# Patient Record
Sex: MALE | Race: WHITE | NOT HISPANIC OR LATINO | Employment: FULL TIME | URBAN - METROPOLITAN AREA
[De-identification: names, ages, dates, MRNs, and addresses within clinical notes are randomized per-mention and may not be internally consistent; named-entity substitution may affect disease eponyms.]

---

## 2018-01-30 ENCOUNTER — OFFICE VISIT (OUTPATIENT)
Dept: MEDICAL GROUP | Facility: MEDICAL CENTER | Age: 43
End: 2018-01-30
Payer: COMMERCIAL

## 2018-01-30 VITALS
TEMPERATURE: 98.2 F | WEIGHT: 212 LBS | HEIGHT: 67 IN | RESPIRATION RATE: 16 BRPM | HEART RATE: 99 BPM | BODY MASS INDEX: 33.27 KG/M2 | SYSTOLIC BLOOD PRESSURE: 130 MMHG | DIASTOLIC BLOOD PRESSURE: 80 MMHG | OXYGEN SATURATION: 96 %

## 2018-01-30 DIAGNOSIS — Z76.89 ENCOUNTER TO ESTABLISH CARE: ICD-10-CM

## 2018-01-30 DIAGNOSIS — I27.82 OTHER CHRONIC PULMONARY EMBOLISM WITHOUT ACUTE COR PULMONALE (HCC): ICD-10-CM

## 2018-01-30 DIAGNOSIS — Z82.49 FAMILY HISTORY OF MI (MYOCARDIAL INFARCTION): ICD-10-CM

## 2018-01-30 DIAGNOSIS — G47.33 OBSTRUCTIVE SLEEP APNEA SYNDROME: ICD-10-CM

## 2018-01-30 DIAGNOSIS — E78.5 HYPERLIPIDEMIA, UNSPECIFIED HYPERLIPIDEMIA TYPE: ICD-10-CM

## 2018-01-30 DIAGNOSIS — E66.9 OBESITY (BMI 30-39.9): ICD-10-CM

## 2018-01-30 DIAGNOSIS — K21.9 GASTROESOPHAGEAL REFLUX DISEASE, ESOPHAGITIS PRESENCE NOT SPECIFIED: ICD-10-CM

## 2018-01-30 PROBLEM — I26.99 PULMONARY EMBOLISM WITHOUT ACUTE COR PULMONALE (HCC): Status: ACTIVE | Noted: 2018-01-30

## 2018-01-30 PROBLEM — K22.70 BARRETT ESOPHAGUS: Status: ACTIVE | Noted: 2018-01-30

## 2018-01-30 PROCEDURE — 99204 OFFICE O/P NEW MOD 45 MIN: CPT | Performed by: PHYSICIAN ASSISTANT

## 2018-01-30 RX ORDER — RANITIDINE 150 MG/1
150 TABLET ORAL 2 TIMES DAILY
Qty: 60 TAB | Refills: 3 | Status: SHIPPED | OUTPATIENT
Start: 2018-01-30 | End: 2018-07-11 | Stop reason: SDUPTHER

## 2018-01-30 RX ORDER — SIMVASTATIN 10 MG
10 TABLET ORAL NIGHTLY
COMMUNITY
End: 2018-12-03 | Stop reason: SDUPTHER

## 2018-01-30 RX ORDER — AMITRIPTYLINE HYDROCHLORIDE 100 MG/1
100 TABLET ORAL NIGHTLY
Status: ON HOLD | COMMUNITY
End: 2019-03-21

## 2018-01-30 RX ORDER — PANTOPRAZOLE SODIUM 40 MG/1
40 TABLET, DELAYED RELEASE ORAL 2 TIMES DAILY
COMMUNITY
End: 2020-01-07

## 2018-01-30 ASSESSMENT — PATIENT HEALTH QUESTIONNAIRE - PHQ9: CLINICAL INTERPRETATION OF PHQ2 SCORE: 0

## 2018-01-30 NOTE — ASSESSMENT & PLAN NOTE
Has a chronic history of reflux symptoms. Symptoms are usually worse in the early morning hours. He will take Protonix half hour prior to dinner. Still will develop symptoms around 3 AM. Has been evaluated with multiple upper and lower endoscopies. Has Randle's esophagus. Also has a early family history of colon cancer. Has had colonoscopies as well.

## 2018-01-30 NOTE — PROGRESS NOTES
Subjective:   Jerome Cardoza is a 42 y.o. male here today for establishing care and for CPAP order.    Obstructive sleep apnea syndrome  This is a 42-year-old male who is here today to establish care. Insurance changed. He states he is requesting a CPAP machine. He is already been evaluated by pulmonology. The CPAP was going to be ordered but his insurance changed.    Pulmonary embolism without acute cor pulmonale (CMS-HCC)  In October after his total knee replacement on the left side he developed a PE of his left lower lung. He is currently asymptomatic on Eliquis. States that he will be on the medication until end of July.    GERD (gastroesophageal reflux disease)  Has a chronic history of reflux symptoms. Symptoms are usually worse in the early morning hours. He will take Protonix half hour prior to dinner. Still will develop symptoms around 3 AM. Has been evaluated with multiple upper and lower endoscopies. Has Randle's esophagus. Also has a early family history of colon cancer. Has had colonoscopies as well.    Hyperlipidemia  Father  at age 62 from a heart attack. He is currently on Zocor daily for elevated cholesterol.       Current medicines (including changes today)  Current Outpatient Prescriptions   Medication Sig Dispense Refill   • pantoprazole (PROTONIX) 40 MG Tablet Delayed Response Take 40 mg by mouth.     • amitriptyline (ELAVIL) 100 MG Tab Take 100 mg by mouth every evening.     • simvastatin (ZOCOR) 10 MG Tab Take 10 mg by mouth every evening.     • apixaban (ELIQUIS) 5mg Tab Take 5 mg by mouth 2 Times a Day.     • ranitidine (ZANTAC) 150 MG Tab Take 1 Tab by mouth 2 times a day. 60 Tab 3   • ondansetron (ZOFRAN ODT) 4 MG TBDP Take 1 Tab by mouth every 8 hours as needed for Nausea/Vomiting. 15 Tab 1     No current facility-administered medications for this visit.      He  has a past medical history of Bronchitis; GERD (gastroesophageal reflux disease); IBS (irritable bowel  "syndrome); Indigestion; Pneumonia; and Renal disorder.    ROS   No chest pain, no shortness of breath, no abdominal pain and all other systems were reviewed and are negative.    Past medical history, social history and family history were updated and reviewed.     Objective:     Blood pressure 130/80, pulse 99, temperature 36.8 °C (98.2 °F), resp. rate 16, height 1.702 m (5' 7\"), weight 96.2 kg (212 lb), SpO2 96 %. Body mass index is 33.2 kg/m².   Physical Exam:  Constitutional: Alert, no distress.  Skin: Warm, dry, good turgor, no rashes in visible areas.  Eye: Equal, round and reactive, conjunctiva clear, lids normal.  ENMT: Lips without lesions, good dentition, oropharynx clear.  Neck: Trachea midline, no masses.   Lymph: No cervical or supraclavicular lymphadenopathy  Respiratory: Unlabored respiratory effort, lungs clear to auscultation, no wheezes, no ronchi.  Cardiovascular: Normal S1, S2, no murmur, no edema.  Abdomen: Soft, non-tender, no masses.  Psych: Alert and oriented x3, normal affect and mood.        Assessment and Plan:   The following treatment plan was discussed    1. Obstructive sleep apnea syndrome  Chronic condition. We'll try to obtain previous medical records at [facility]. We'll try also to order a CPAP machine.  - DME CPAP    2. Other chronic pulmonary embolism without acute cor pulmonale (CMS-HCC)  Chronic condition. Continue Eliquis daily. Contact me if request refill.    3. Hyperlipidemia, unspecified hyperlipidemia type  Chronic condition. Will obtain previous medical records to check on recent labs. Continue Zocor daily.    4. Gastroesophageal reflux disease, esophagitis presence not specified  Chronic condition and uncontrolled. Continue Protonix half hour prior to dinner. We'll also try ranitidine 150 mg hour prior to bedtime. He take 2 tablets if one tablet is not effective. Follow-up with GI as needed.  - ranitidine (ZANTAC) 150 MG Tab; Take 1 Tab by mouth 2 times a day.  Dispense: " 60 Tab; Refill: 3    5. Obesity (BMI 30-39.9)  Exercise routinely eat healthier. We'll monitor.  - Patient identified as having weight management issue.  Appropriate orders and counseling given.    6. Encounter to establish care      Followup: Return if symptoms worsen or fail to improve.    Please note that this dictation was created using voice recognition software. I have made every reasonable attempt to correct obvious errors, but I expect that there are errors of grammar and possibly content that I did not discover before finalizing the note.

## 2018-01-30 NOTE — ASSESSMENT & PLAN NOTE
In October after his total knee replacement on the left side he developed a PE of his left lower lung. He is currently asymptomatic on Eliquis. States that he will be on the medication until end of July.

## 2018-01-30 NOTE — LETTER
[facility]  Andrea Machado P.A.-C.  [address]  [place] [address]  Fax: [phone]   Authorization for Release/Disclosure of   Protected Health Information   Name: JEROME SALAZAR : 1975 SSN: [id number]   Address: [address] [place] [address] [address]  [place] [address] Phone:    [phone] (home)    I authorize the entity listed below to release/disclose the PHI below to:   [facility]/Andrea Machado P.A.-C. and Andrea Machado P.A.-C.   Provider or Entity Name:     Address   City, State, Zip   Phone:      Fax:     Reason for request: continuity of care   Information to be released:    [  ] LAST COLONOSCOPY,  including any PATH REPORT and follow-up  [  ] LAST FIT/COLOGUARD RESULT [  ] LAST DEXA  [  ] LAST MAMMOGRAM  [  ] LAST PAP  [  ] LAST LABS [  ] RETINA EXAM REPORT  [  ] IMMUNIZATION RECORDS  [  ] Release all info      [  ] Check here and initial the line next to each item to release ALL health information INCLUDING  _____ Care and treatment for drug and / or alcohol abuse  _____ HIV testing, infection status, or AIDS  _____ Genetic Testing    DATES OF SERVICE OR TIME PERIOD TO BE DISCLOSED: _____________  I understand and acknowledge that:  * This Authorization may be revoked at any time by you in writing, except if your health information has already been used or disclosed.  * Your health information that will be used or disclosed as a result of you signing this authorization could be re-disclosed by the recipient. If this occurs, your re-disclosed health information may no longer be protected by State or Federal laws.  * You may refuse to sign this Authorization. Your refusal will not affect your ability to obtain treatment.  * This Authorization becomes effective upon signing and will  on (date) __________.      If no date is indicated, this Authorization will  one (1) year from the signature date.    Name: Jerome Salazar    Signature:   Date:          1/30/2018       PLEASE FAX REQUESTED RECORDS BACK TO: [phone]

## 2018-01-30 NOTE — ASSESSMENT & PLAN NOTE
This is a 42-year-old male who is here today to establish care. Insurance changed. He states he is requesting a CPAP machine. He is already been evaluated by pulmonology. The CPAP was going to be ordered but his insurance changed.

## 2018-02-22 ENCOUNTER — OFFICE VISIT (OUTPATIENT)
Dept: MEDICAL GROUP | Facility: MEDICAL CENTER | Age: 43
End: 2018-02-22
Payer: COMMERCIAL

## 2018-02-22 VITALS
HEIGHT: 67 IN | DIASTOLIC BLOOD PRESSURE: 84 MMHG | TEMPERATURE: 99.1 F | RESPIRATION RATE: 16 BRPM | HEART RATE: 108 BPM | OXYGEN SATURATION: 97 % | BODY MASS INDEX: 31.55 KG/M2 | WEIGHT: 201 LBS | SYSTOLIC BLOOD PRESSURE: 124 MMHG

## 2018-02-22 DIAGNOSIS — K21.9 GASTROESOPHAGEAL REFLUX DISEASE, ESOPHAGITIS PRESENCE NOT SPECIFIED: ICD-10-CM

## 2018-02-22 DIAGNOSIS — E78.5 HYPERLIPIDEMIA, UNSPECIFIED HYPERLIPIDEMIA TYPE: ICD-10-CM

## 2018-02-22 DIAGNOSIS — R11.2 NON-INTRACTABLE VOMITING WITH NAUSEA, UNSPECIFIED VOMITING TYPE: ICD-10-CM

## 2018-02-22 DIAGNOSIS — K22.70 BARRETT'S ESOPHAGUS WITHOUT DYSPLASIA: ICD-10-CM

## 2018-02-22 DIAGNOSIS — G47.33 OBSTRUCTIVE SLEEP APNEA SYNDROME: ICD-10-CM

## 2018-02-22 DIAGNOSIS — Z87.19 HISTORY OF ESOPHAGEAL STRICTURE: ICD-10-CM

## 2018-02-22 DIAGNOSIS — Z82.69 FAMILY HISTORY OF SYSTEMIC LUPUS ERYTHEMATOSUS (SLE) IN MOTHER: ICD-10-CM

## 2018-02-22 PROCEDURE — 99214 OFFICE O/P EST MOD 30 MIN: CPT | Performed by: PHYSICIAN ASSISTANT

## 2018-02-22 NOTE — LETTER
[facility]  Andrea Machado P.A.-C.  [address]  [place] [address]  Fax: [phone]   Authorization for Release/Disclosure of   Protected Health Information   Name: JEROME SALAZAR : 1975 SSN: [id number]   Address: [address] [place], [place] [address]  [place] [address] Phone:    [phone] (home)    I authorize the entity listed below to release/disclose the PHI below to:   [facility]/Andrea Machado P.A.-C. and Andrea Machado P.A.-C.   Provider or Entity Name:  [facility]   Address   City, State, Zip [place]   Phone:      Fax:     Reason for request: continuity of care   Information to be released:    [  ] LAST COLONOSCOPY,  including any PATH REPORT and follow-up  [  ] LAST FIT/COLOGUARD RESULT [  ] LAST DEXA  [  ] LAST MAMMOGRAM  [  ] LAST PAP  [  ] LAST LABS [  ] RETINA EXAM REPORT  [  ] IMMUNIZATION RECORDS  [ x ] Release all info      [  ] Check here and initial the line next to each item to release ALL health information INCLUDING  _____ Care and treatment for drug and / or alcohol abuse  _____ HIV testing, infection status, or AIDS  _____ Genetic Testing    DATES OF SERVICE OR TIME PERIOD TO BE DISCLOSED: _____________  I understand and acknowledge that:  * This Authorization may be revoked at any time by you in writing, except if your health information has already been used or disclosed.  * Your health information that will be used or disclosed as a result of you signing this authorization could be re-disclosed by the recipient. If this occurs, your re-disclosed health information may no longer be protected by State or Federal laws.  * You may refuse to sign this Authorization. Your refusal will not affect your ability to obtain treatment.  * This Authorization becomes effective upon signing and will  on (date) __________.      If no date is indicated, this Authorization will  one (1) year from the signature date.    Name: Jerome Becerra  [place]    Signature:   Date:     2/22/2018       PLEASE FAX REQUESTED RECORDS BACK TO: [phone]

## 2018-02-23 NOTE — ASSESSMENT & PLAN NOTE
This is a 42-year-old male who complains of a two-week history of nauseousness and vomiting. States that symptoms began when he could not swallow food. Food gets stuck down in his esophagus. He states that in the past he has had dilatation of a stricture. Reflux symptoms are controlled. He is taking Protonix and added Zantac which is helping anymore. Has an appointment to see his doctor at [facility] but appointment isn't until April. Symptoms occurred in [place] while he was visiting his sister and mother. One of his sister who is a twin has lymphoma. He was evaluated at Hospital. He states that labs were unremarkable except for a decreased GFR and slightly elevated liver enzymes. Abdominal CT scan was done which was negative. Last 3 days has gotten better. Did have one episode last night of nauseousness with vomiting. He is trying to chew up his food as well as drink more liquids and soft foods.

## 2018-02-23 NOTE — ASSESSMENT & PLAN NOTE
He has a mother and sister with lupus. Concerned about his symptoms being secondary to lupus. Denies any skin changes.

## 2018-02-23 NOTE — ASSESSMENT & PLAN NOTE
He states he still is concerned about sleep apnea. Last time we talked about it and he has been waiting to be contacted.

## 2018-03-15 ENCOUNTER — HOSPITAL ENCOUNTER (OUTPATIENT)
Dept: LAB | Facility: MEDICAL CENTER | Age: 43
End: 2018-03-15
Attending: PHYSICIAN ASSISTANT
Payer: COMMERCIAL

## 2018-03-15 DIAGNOSIS — E78.5 HYPERLIPIDEMIA, UNSPECIFIED HYPERLIPIDEMIA TYPE: ICD-10-CM

## 2018-03-15 DIAGNOSIS — Z82.69 FAMILY HISTORY OF SYSTEMIC LUPUS ERYTHEMATOSUS (SLE) IN MOTHER: ICD-10-CM

## 2018-03-15 DIAGNOSIS — R11.2 NON-INTRACTABLE VOMITING WITH NAUSEA, UNSPECIFIED VOMITING TYPE: ICD-10-CM

## 2018-03-15 LAB
CHOLEST SERPL-MCNC: 199 MG/DL (ref 100–199)
HDLC SERPL-MCNC: 41 MG/DL
LDLC SERPL CALC-MCNC: 120 MG/DL
TRIGL SERPL-MCNC: 189 MG/DL (ref 0–149)

## 2018-03-15 PROCEDURE — 80061 LIPID PANEL: CPT

## 2018-03-15 PROCEDURE — 36415 COLL VENOUS BLD VENIPUNCTURE: CPT

## 2018-03-15 PROCEDURE — 86038 ANTINUCLEAR ANTIBODIES: CPT

## 2018-03-17 LAB — NUCLEAR IGG SER QL IA: NORMAL

## 2018-03-23 ENCOUNTER — SLEEP CENTER VISIT (OUTPATIENT)
Dept: SLEEP MEDICINE | Facility: MEDICAL CENTER | Age: 43
End: 2018-03-23
Payer: COMMERCIAL

## 2018-03-23 VITALS
DIASTOLIC BLOOD PRESSURE: 90 MMHG | HEIGHT: 67 IN | WEIGHT: 203 LBS | OXYGEN SATURATION: 98 % | RESPIRATION RATE: 16 BRPM | HEART RATE: 101 BPM | BODY MASS INDEX: 31.86 KG/M2 | SYSTOLIC BLOOD PRESSURE: 144 MMHG

## 2018-03-23 DIAGNOSIS — E78.5 HYPERLIPIDEMIA, UNSPECIFIED HYPERLIPIDEMIA TYPE: ICD-10-CM

## 2018-03-23 DIAGNOSIS — K21.9 GASTROESOPHAGEAL REFLUX DISEASE, ESOPHAGITIS PRESENCE NOT SPECIFIED: ICD-10-CM

## 2018-03-23 DIAGNOSIS — I27.82 OTHER CHRONIC PULMONARY EMBOLISM WITHOUT ACUTE COR PULMONALE (HCC): ICD-10-CM

## 2018-03-23 DIAGNOSIS — E66.9 OBESITY (BMI 30-39.9): ICD-10-CM

## 2018-03-23 DIAGNOSIS — Z72.0 TOBACCO ABUSE: ICD-10-CM

## 2018-03-23 DIAGNOSIS — G47.33 OBSTRUCTIVE SLEEP APNEA SYNDROME: ICD-10-CM

## 2018-03-23 PROCEDURE — 99244 OFF/OP CNSLTJ NEW/EST MOD 40: CPT | Performed by: INTERNAL MEDICINE

## 2018-03-23 RX ORDER — TRAMADOL HYDROCHLORIDE 50 MG/1
50 TABLET ORAL
COMMUNITY
Start: 2018-03-12 | End: 2018-03-19

## 2018-03-23 NOTE — PROGRESS NOTES
"CC: Here to establish care for obstructive sleep apnea syndrome.    HPI:  Mr. Jerome Mcmillan is a 43-year-old man who is kindly referred by Andrea Machado PA-C for evaluation of obstructive sleep apnea syndrome. The patient tells me that he had a home sleep study performed by [facility] which demonstrated sleep apnea. Had had an abnormal cnox first.  However his insurance changed before the treatment was ordered. He is here today for further evaluation and management of his sleep apnea and possibly for DME CPAP. Hopefully, this study he had in November will still qualify him for a trial of auto titrating CPAP. If not, our options are to repeat a home sleep test versus performing and attended split night polysomnogram. 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    His symptoms include waking up out of breath, morning headaches, fatigue, 2-3 nocturnal awakenings, waking up too early in the morning and being unable to return to sleep, sleepiness during the day, too little sleep at night, tiredness during the day, witnessed apneas, inconsistent breathing, dreams of falling or drowning, loud and disturbing snoring, resuscitative snorts, disturbing  dreams, and nocturnal headaches. He may fall asleep watching TV, at a theater, riding in a bus or car.    His sleep diary reveals no for 7 days but he awakened with a headache 6 of 7 days. He generally goes to bed between 10 PM and midnight and gets up between 5 AM and 8 AM.    His partner describes loud snoring, twitching of legs or feet during sleep, pauses in breathing, sleep talking, witnessed apneas, resuscitative snorts, and tossing and turning throughout the night. These behaviors occur every night not been present for \"a couple of years\".    Dad used cpap, though he has now . Mom and sisters use cpap.      Is APNEALINK was performed on 2017. The device use was an APNEALINK air type 3 portable device. The recording duration was 9 " hours and 38 minutes. His respiratory event index was 11.7. He had 222 oxygen desaturations and his MARGY was 23.6. He spent 4 hours and 58 minutes with saturations less than or equal to 88%.            Patient Active Problem List    Diagnosis Date Noted   • Tobacco abuse 03/23/2018   • History of esophageal stricture 02/22/2018   • Family history of systemic lupus erythematosus (SLE) in mother 02/22/2018   • Obstructive sleep apnea syndrome 01/30/2018   • Pulmonary embolism without acute cor pulmonale (CMS-HCC) 01/30/2018   • Hyperlipidemia 01/30/2018   • GERD (gastroesophageal reflux disease) 01/30/2018   • Obesity (BMI 30-39.9) 01/30/2018   • Randle esophagus 01/30/2018   • Family history of MI (myocardial infarction) 01/30/2018   • Non-intractable vomiting with nausea 11/28/2013       Past Medical History:   Diagnosis Date   • Bronchitis    • Chickenpox    • GERD (gastroesophageal reflux disease)    • IBS (irritable bowel syndrome)    • Indigestion    • Influenza    • Pneumonia    • Pulmonary embolism (CMS-[facility])    • Renal disorder     Acute Renal Failure from dehydration in the past   • Sleep apnea         Past Surgical History:   Procedure Laterality Date   • KNEE REPLACEMENT, TOTAL Left 10/2017   • LENORA BY LAPAROSCOPY      Cholecystectomy, Laparoscopic   • OTHER ORTHOPEDIC SURGERY     • TONSILLECTOMY     • TONSILLECTOMY         Family History   Problem Relation Age of Onset   • Other Mother      Lupus   • Sleep Apnea Mother    • Heart Disease Father    • Hypertension Father    • Sleep Apnea Father        Social History     Social History   • Marital status:      Spouse name: N/A   • Number of children: N/A   • Years of education: N/A     Occupational History   • Not on file.     Social History Main Topics   • Smoking status: Current Every Day Smoker     Packs/day: 0.50     Years: 22.00     Types: Cigarettes   • Smokeless tobacco: Never Used   • Alcohol use No   • Drug use: Yes     Types: Inhaled       "Comment: marijuana occ   • Sexual activity: Yes     Partners: Female     Other Topics Concern   • Not on file     Social History Narrative   • No narrative on file       Current Outpatient Prescriptions   Medication Sig Dispense Refill   • apixaban (ELIQUIS) 5mg Tab Take 1 Tab by mouth 2 Times a Day. 60 Tab 0   • pantoprazole (PROTONIX) 40 MG Tablet Delayed Response Take 40 mg by mouth.     • amitriptyline (ELAVIL) 100 MG Tab Take 100 mg by mouth every evening.     • simvastatin (ZOCOR) 10 MG Tab Take 10 mg by mouth every evening.     • ranitidine (ZANTAC) 150 MG Tab Take 1 Tab by mouth 2 times a day. 60 Tab 3   • ondansetron (ZOFRAN ODT) 4 MG TBDP Take 1 Tab by mouth every 8 hours as needed for Nausea/Vomiting. 15 Tab 1     No current facility-administered medications for this visit.     \"CURRENT RX\"    ALLERGIES: Fish    ROS  Constitutional: Denies fever, chills, sweats,  weight loss, fatigue.  Eyes: Denies vision loss, pain, drainage, double vision, glasses.  Ears/Nose/Mouth/Throat: Denies earache, rhinitis/nasal congestion, injury, recurrent sore throat, persistent hoarseness, decayed teeth/toothaches, ringing or buzzing in the ears. Difficulty hearing Cardiovascular: Denies chest pain, tightness, palpitations, swelling in legs/feet, fainting, difficulty breathing when lying down but gets better when sitting up.   Respiratory: Positive for shortness of breath, cough, painful breathing in the past but denies sputum and wheezing. He has not any symptoms regularly.   Sleep: per HPI  Gastrointestinal: Denies regular problems with difficulty swallowing, nausea, abdominal pain, diarrhea, constipation, heartburn. He had heartburn, difficulty swallowing, nausea, and abdominal pain in February. Genitourinary: Denies  blood in urine, discharge, frequent urination.   Musculoskeletal: Positive for painful joints and sore muscles but denies back pain. He has his shoes daily.  Integumentary: Denies rashes, lumps, color " "changes.   Neurological: Denies frequent headaches,weakness, dizziness.    PHYSICAL EXAM  MSEW    /90   Pulse (!) 101   Resp 16   Ht 1.702 m (5' 7\")   Wt 92.1 kg (203 lb)   SpO2 98%   BMI 31.79 kg/m²   Appearance: Well-nourished, well-developed, no acute distress. Has beard.  Eyes:  PERRLA, EOMI  Hearing:  Grossly intact  Nose:  Normal, no lesions or deformities, turbinates moist  Oropharynx:  Tongue normal, posterior pharynx without erythema or exudate  Mallampati classification:    Neck: Supple, trachea midline, no masses  Respiratory effort:  No intercostal retractions or use of accessory muscles  Lung auscultation:  No wheezes rhonchi rubs or rales  Cardiac auscultation:  No murmurs, rubs, or gallops, no regular rhythm, normal rate  Abdomen:  No tenderness, no organomegaly  Extremities:  No cyanosis, clubbing, edema  Gait and Station:  Normal  Digits and nails: No clubbing, cyanosis, petechiae, or nodes  Musculoskeletal:  Grossly normal  Skin:  No rashes  Orientation:  Oriented time, place, and person  Mood and affect:  No depression, anxiety, agitation  Judgment:  Intact    PROBLEMS:  1. Obstructive sleep apnea syndrome    2. Obesity (BMI 30-39.9)    - OBESITY COUNSELING (No Charge): Patient identified as having weight management issue.  Appropriate orders and counseling given.    3. Other chronic pulmonary embolism without acute cor pulmonale (CMS-HCC)    4. Gastroesophageal reflux disease, esophagitis presence not specified    5. Tobacco abuse    6. Hyperlipidemia, unspecified hyperlipidemia type              PLAN:   The risks of untreated sleep apnea were discussed with the patient at length. Patients with JEN are at increased risk of cardiovascular disease including coronary artery disease, systemic arterial hypertension, pulmonary arterial hypertension, cardiac arrythmias, and stroke. JEN patients have an increased risk of motor vehicle accidents, type 2 diabetes, chronic kidney disease, and " non-alcoholic liver disease. The patient was advised to avoid driving a motor vehicle when drowsy.    Positive airway pressure, such as CPAP, is considered first-line and preferred therapy for sleep apnea and may reverse both symptoms and risks.           Return in about 6 weeks (around 5/4/2018).

## 2018-04-09 ENCOUNTER — OFFICE VISIT (OUTPATIENT)
Dept: MEDICAL GROUP | Facility: MEDICAL CENTER | Age: 43
End: 2018-04-09
Payer: COMMERCIAL

## 2018-04-09 VITALS
SYSTOLIC BLOOD PRESSURE: 138 MMHG | BODY MASS INDEX: 32.05 KG/M2 | DIASTOLIC BLOOD PRESSURE: 80 MMHG | HEIGHT: 67 IN | OXYGEN SATURATION: 97 % | HEART RATE: 102 BPM | TEMPERATURE: 98.1 F | WEIGHT: 204.2 LBS

## 2018-04-09 DIAGNOSIS — I27.82 OTHER CHRONIC PULMONARY EMBOLISM WITHOUT ACUTE COR PULMONALE (HCC): ICD-10-CM

## 2018-04-09 DIAGNOSIS — G47.33 OBSTRUCTIVE SLEEP APNEA SYNDROME: ICD-10-CM

## 2018-04-09 DIAGNOSIS — R00.0 TACHYCARDIA: ICD-10-CM

## 2018-04-09 DIAGNOSIS — J20.9 ACUTE BRONCHITIS, UNSPECIFIED ORGANISM: ICD-10-CM

## 2018-04-09 DIAGNOSIS — K21.9 GASTROESOPHAGEAL REFLUX DISEASE, ESOPHAGITIS PRESENCE NOT SPECIFIED: ICD-10-CM

## 2018-04-09 PROCEDURE — 99214 OFFICE O/P EST MOD 30 MIN: CPT | Performed by: PHYSICIAN ASSISTANT

## 2018-04-09 RX ORDER — PREDNISONE 20 MG/1
TABLET ORAL
Qty: 10 TAB | Refills: 0 | Status: SHIPPED | OUTPATIENT
Start: 2018-04-09 | End: 2018-05-20

## 2018-04-09 NOTE — ASSESSMENT & PLAN NOTE
He did see sleep study and has a CPAP machine. States it helped with his headaches. Has follow-up with pulmonology soon.

## 2018-04-09 NOTE — ASSESSMENT & PLAN NOTE
Complains of a three-day history of a cough. No fevers. No shortness of breath or chest pain. Minimal wheezing noted.

## 2018-04-09 NOTE — PROGRESS NOTES
Subjective:   Jerome Cardoza Jr. is a 43 y.o. male here today for follow-up on tachycardia and a cough for 3 days.    Tachycardia  This is a 43-year-old male who is here today to discuss his heart rate being fast. Typically while resting pulse will jump in the 140s. Resting pulse is usually in the 60s. He noticed that this symptom began shortly before being diagnosed with a pulmonary embolism. Also concerned that Eliquis may be increasing in his pulse. Denies any chest pain no shortness of breath.    Pulmonary embolism without acute cor pulmonale (CMS-HCC)  Continues with Eliquis daily.    Obstructive sleep apnea syndrome  He did see sleep study and has a CPAP machine. States it helped with his headaches. Has follow-up with pulmonology soon.    Acute bronchitis  Complains of a three-day history of a cough. No fevers. No shortness of breath or chest pain. Minimal wheezing noted.    GERD (gastroesophageal reflux disease)  Has an appointment next month with GI. Symptoms aren't improved taking Protonix daily as well as Zantac prior to bedtime. No recent issues with vomiting in the morning.       Current medicines (including changes today)  Current Outpatient Prescriptions   Medication Sig Dispense Refill   • predniSONE (DELTASONE) 20 MG Tab Take two tablets daily x 5 days. 10 Tab 0   • apixaban (ELIQUIS) 5mg Tab Take 1 Tab by mouth 2 Times a Day. 60 Tab 0   • pantoprazole (PROTONIX) 40 MG Tablet Delayed Response Take 40 mg by mouth.     • amitriptyline (ELAVIL) 100 MG Tab Take 100 mg by mouth every evening.     • simvastatin (ZOCOR) 10 MG Tab Take 10 mg by mouth every evening.     • ranitidine (ZANTAC) 150 MG Tab Take 1 Tab by mouth 2 times a day. 60 Tab 3   • ondansetron (ZOFRAN ODT) 4 MG TBDP Take 1 Tab by mouth every 8 hours as needed for Nausea/Vomiting. 15 Tab 1     No current facility-administered medications for this visit.      He  has a past medical history of Bronchitis; Chickenpox; GERD  "(gastroesophageal reflux disease); IBS (irritable bowel syndrome); Indigestion; Influenza; Pneumonia; Pulmonary embolism (CMS-HCC); Renal disorder; and Sleep apnea.    Social History and Family History were reviewed and updated.    ROS   No chest pain, no shortness of breath, no abdominal pain and all other systems were reviewed and are negative.       Objective:     Blood pressure 138/80, pulse (!) 102, temperature 36.7 °C (98.1 °F), height 1.702 m (5' 7\"), weight 92.6 kg (204 lb 3.2 oz), SpO2 97 %. Body mass index is 31.98 kg/m².   Physical Exam:  Constitutional: Alert, no distress.  Skin: Warm, dry, good turgor, no rashes in visible areas.  Eye: Equal, round and reactive, conjunctiva clear, lids normal.  ENMT: Lips without lesions, good dentition, oropharynx clear.  Neck: Trachea midline, no masses.   Lymph: No cervical or supraclavicular lymphadenopathy  Respiratory: Unlabored respiratory effort, lungs with bilateral upper and lower base wheezing noted.  Cardiovascular: Normal S1, S2, no murmur, no edema.  Abdomen: Soft, non-tender, no masses.  Psych: Alert and oriented x3, normal affect and mood.        Assessment and Plan:   The following treatment plan was discussed    1. Tachycardia  Chronic condition. Advised to stop smoking. Exercise and eat healthier to lose weight. Referred to cardiology to establish care. I do not believe Eliquis is a reason for his tachycardia. Also does have hypertension.  - REFERRAL TO CARDIOLOGY    2. Acute bronchitis, unspecified organism  Acute, new onset condition. Advised to stop smoking. Provided prednisone 40 mg 5 days. No rescue inhaler provided. Contact me with any concerns. Follow as needed.  - predniSONE (DELTASONE) 20 MG Tab; Take two tablets daily x 5 days.  Dispense: 10 Tab; Refill: 0    3. Other chronic pulmonary embolism without acute cor pulmonale (CMS-HCC)  Chronic condition. Stable. Continue medication as directed. Continue to follow-up with pulmonology.    4. " Obstructive sleep apnea syndrome  Chronic condition. Stable. Continue CPAP use. Follow up with pulmonology as needed.    5. Gastroesophageal reflux disease, esophagitis presence not specified  Connick condition. Stable. Continue medications as directed. Follow-up with GI.      Followup: Return in about 4 weeks (around 5/7/2018), or if symptoms worsen or fail to improve.    Please note that this dictation was created using voice recognition software. I have made every reasonable attempt to correct obvious errors, but I expect that there are errors of grammar and possibly content that I did not discover before finalizing the note.

## 2018-04-09 NOTE — ASSESSMENT & PLAN NOTE
This is a 43-year-old male who is here today to discuss his heart rate being fast. Typically while resting pulse will jump in the 140s. Resting pulse is usually in the 60s. He noticed that this symptom began shortly before being diagnosed with a pulmonary embolism. Also concerned that Eliquis may be increasing in his pulse. Denies any chest pain no shortness of breath.

## 2018-04-09 NOTE — ASSESSMENT & PLAN NOTE
Has an appointment next month with GI. Symptoms aren't improved taking Protonix daily as well as Zantac prior to bedtime. No recent issues with vomiting in the morning.

## 2018-05-08 ENCOUNTER — TELEPHONE (OUTPATIENT)
Dept: CARDIOLOGY | Facility: MEDICAL CENTER | Age: 43
End: 2018-05-08

## 2018-05-16 ENCOUNTER — OFFICE VISIT (OUTPATIENT)
Dept: CARDIOLOGY | Facility: MEDICAL CENTER | Age: 43
End: 2018-05-16
Payer: COMMERCIAL

## 2018-05-16 VITALS
BODY MASS INDEX: 29.55 KG/M2 | HEART RATE: 104 BPM | HEIGHT: 68 IN | SYSTOLIC BLOOD PRESSURE: 124 MMHG | OXYGEN SATURATION: 97 % | DIASTOLIC BLOOD PRESSURE: 80 MMHG | WEIGHT: 195 LBS

## 2018-05-16 DIAGNOSIS — E78.5 DYSLIPIDEMIA: ICD-10-CM

## 2018-05-16 DIAGNOSIS — R00.0 HEART RATE FAST: ICD-10-CM

## 2018-05-16 DIAGNOSIS — Z82.49 FAMILY HISTORY OF MI (MYOCARDIAL INFARCTION): ICD-10-CM

## 2018-05-16 DIAGNOSIS — R00.0 TACHYCARDIA: ICD-10-CM

## 2018-05-16 DIAGNOSIS — I27.82 OTHER CHRONIC PULMONARY EMBOLISM WITHOUT ACUTE COR PULMONALE (HCC): ICD-10-CM

## 2018-05-16 PROCEDURE — 99244 OFF/OP CNSLTJ NEW/EST MOD 40: CPT | Mod: 25 | Performed by: INTERNAL MEDICINE

## 2018-05-16 PROCEDURE — 93000 ELECTROCARDIOGRAM COMPLETE: CPT | Performed by: INTERNAL MEDICINE

## 2018-05-16 ASSESSMENT — ENCOUNTER SYMPTOMS
PALPITATIONS: 0
HEADACHES: 1
PND: 0
COUGH: 0
DIZZINESS: 0
FOCAL WEAKNESS: 0
NAUSEA: 1
CLAUDICATION: 0
BRUISES/BLEEDS EASILY: 0
WEAKNESS: 0
SORE THROAT: 0
EYE REDNESS: 1
CHILLS: 0
HEARTBURN: 1
ABDOMINAL PAIN: 1
SHORTNESS OF BREATH: 1
BLURRED VISION: 0
FEVER: 0
FALLS: 0
VOMITING: 1

## 2018-05-16 NOTE — PROGRESS NOTES
Chief Complaint   Patient presents with   • Tachycardia       Subjective:   Jerome Cardoza Jr. is a 43 y.o. male who presents today in consultation from Mr. Machado for evaluation of tachycardia.  He has a history of obesity, sleep apnea on CPAP therapy, pulmonary embolism which seem to be provoked by left knee replacement in September 2017 on anticoagulation who has noted persistent tachycardia he has been checking his vitals at home and at rest he often has heart rates in the 110.  He still has some symptoms from his pulmonary embolism especially with activity he will notice some pleuritic chest pain on the left side but overall this has improved he follow closely with pulmonary afterwards and was recommended anticoagulation until the end of July for a full year of therapy.    Is also suffered from cyclic vomiting syndrome and follows closely with gastroenterology with a history of esophageal stricture and dilation he takes Elavil and closely monitors his diet    Past Medical History:   Diagnosis Date   • Bronchitis    • Chickenpox    • GERD (gastroesophageal reflux disease)    • IBS (irritable bowel syndrome)    • Indigestion    • Influenza    • Pneumonia    • Pulmonary embolism (HCC)    • Renal disorder     Acute Renal Failure from dehydration in the past   • Sleep apnea      Past Surgical History:   Procedure Laterality Date   • KNEE REPLACEMENT, TOTAL Left 10/2017   • LENORA BY LAPAROSCOPY      Cholecystectomy, Laparoscopic   • OTHER ORTHOPEDIC SURGERY     • TONSILLECTOMY     • TONSILLECTOMY       Family History   Problem Relation Age of Onset   • Other Mother      Lupus   • Sleep Apnea Mother    • Heart Disease Father    • Hypertension Father    • Sleep Apnea Father    • Heart Disease Paternal Uncle    • Heart Disease Paternal Grandfather      Social History     Social History   • Marital status:      Spouse name: N/A   • Number of children: N/A   • Years of education: N/A     Occupational  History   • Not on file.     Social History Main Topics   • Smoking status: Current Every Day Smoker     Packs/day: 0.50     Years: 22.00     Types: Cigarettes   • Smokeless tobacco: Never Used   • Alcohol use No   • Drug use: Yes     Types: Inhaled      Comment: marijuana occ   • Sexual activity: Yes     Partners: Female     Other Topics Concern   • Not on file     Social History Narrative   • No narrative on file     Allergies   Allergen Reactions   • Fish Anaphylaxis     Outpatient Encounter Prescriptions as of 5/16/2018   Medication Sig Dispense Refill   • apixaban (ELIQUIS) 5mg Tab Take 1 Tab by mouth 2 Times a Day. 60 Tab 0   • pantoprazole (PROTONIX) 40 MG Tablet Delayed Response Take 40 mg by mouth.     • amitriptyline (ELAVIL) 100 MG Tab Take 100 mg by mouth every evening.     • simvastatin (ZOCOR) 10 MG Tab Take 10 mg by mouth every evening.     • ranitidine (ZANTAC) 150 MG Tab Take 1 Tab by mouth 2 times a day. 60 Tab 3   • ondansetron (ZOFRAN ODT) 4 MG TBDP Take 1 Tab by mouth every 8 hours as needed for Nausea/Vomiting. 15 Tab 1   • predniSONE (DELTASONE) 20 MG Tab Take two tablets daily x 5 days. 10 Tab 0     No facility-administered encounter medications on file as of 5/16/2018.      Review of Systems   Constitutional: Positive for malaise/fatigue. Negative for chills and fever.   HENT: Positive for congestion and hearing loss. Negative for sore throat.    Eyes: Positive for redness. Negative for blurred vision.   Respiratory: Positive for shortness of breath. Negative for cough.    Cardiovascular: Negative for chest pain, palpitations, claudication, leg swelling and PND.   Gastrointestinal: Positive for abdominal pain, heartburn, nausea and vomiting.   Musculoskeletal: Positive for joint pain. Negative for falls.   Skin: Negative for rash.   Neurological: Positive for headaches. Negative for dizziness, focal weakness and weakness.   Endo/Heme/Allergies: Does not bruise/bleed easily.        Objective:  "  /80   Pulse (!) 104   Ht 1.727 m (5' 8\")   Wt 88.5 kg (195 lb)   SpO2 97%   BMI 29.65 kg/m²     Physical Exam   Constitutional: No distress.   HENT:   Mouth/Throat: Oropharynx is clear and moist. No oropharyngeal exudate.   Eyes: No scleral icterus.   Neck: No JVD present.   Cardiovascular: Normal rate and normal heart sounds.  Exam reveals no gallop and no friction rub.    No murmur heard.  Pulmonary/Chest: No respiratory distress. He has no wheezes. He has no rales.   Abdominal: Soft. Bowel sounds are normal.   Musculoskeletal: He exhibits no edema.   Neurological: He is alert.   Skin: No rash noted. He is not diaphoretic.   Psychiatric: He has a normal mood and affect.     EKG today shows sinus tachycardia    I do not have the formal report of his echocardiogram but his right heart size and estimate of RVSP were commented as normal in his pulmonary office follow-up visits    Labs reviewed he has mild dyslipidemia   Assessment:     1. Heart rate fast  EKG   2. Tachycardia     3. Family history of MI (myocardial infarction)     4. Dyslipidemia     5. Other chronic pulmonary embolism without acute cor pulmonale (HCC)         Medical Decision Making:  Today's Assessment / Status / Plan:     It was my pleasure to meet with Mr. Cardoza.    His sinus tachycardia is likely multifactorial could be due to of Elavil and his cyclic vomiting syndrome as a baseline and having had pulmonary embolism this year certainly can be associated with sinus tachycardia we discussed available options for workup and treatment I think overall he would prefer conservative therapy and continue to monitor certainly can try metoprolol if he finds that heart rate being elevated over time is worrisome.  We also discussed he could also do a 24-hour Holter monitor if necessary to have a better understanding of his average heart rate in response to activities as well as sleep    He will complete his course of therapy for his " unprovoked pulmonary embolism    Discussed is quite reasonable take low-dose statin therapy given his family history and dyslipidemia    Mr. Cardoza does not require regular cardiology follow up, I have advised him to call our office or e-mail using my MyChart if needed.    It is my pleasure to participate in the care of Mr. Cardoza.  Please do not hesitate to contact me with questions or concerns.    Ray Almanza MD PhD FACC  Cardiologist [facility]

## 2018-05-16 NOTE — LETTER
[facility]   [address]   [address]  [place] [address]  Phone: [phone]  Fax: [phone]              Jerome Cardoza Jr.  1975    Encounter Date: 5/16/2018    Ray Almanza M.D.          PROGRESS NOTE:  Chief Complaint   Patient presents with   • Tachycardia       Subjective:   Jerome Cardoza Jr. is a 43 y.o. male who presents today in consultation from Mr. Machado for evaluation of tachycardia.  He has a history of obesity, sleep apnea on CPAP therapy, pulmonary embolism which seem to be provoked by left knee replacement in September 2017 on anticoagulation who has noted persistent tachycardia he has been checking his vitals at home and at rest he often has heart rates in the 110.  He still has some symptoms from his pulmonary embolism especially with activity he will notice some pleuritic chest pain on the left side but overall this has improved he follow closely with pulmonary afterwards and was recommended anticoagulation until the end of July for a full year of therapy.    Is also suffered from cyclic vomiting syndrome and follows closely with gastroenterology with a history of esophageal stricture and dilation he takes Elavil and closely monitors his diet    Past Medical History:   Diagnosis Date   • Bronchitis    • Chickenpox    • GERD (gastroesophageal reflux disease)    • IBS (irritable bowel syndrome)    • Indigestion    • Influenza    • Pneumonia    • Pulmonary embolism (HCC)    • Renal disorder     Acute Renal Failure from dehydration in the past   • Sleep apnea      Past Surgical History:   Procedure Laterality Date   • KNEE REPLACEMENT, TOTAL Left 10/2017   • LENORA BY LAPAROSCOPY      Cholecystectomy, Laparoscopic   • OTHER ORTHOPEDIC SURGERY     • TONSILLECTOMY     • TONSILLECTOMY       Family History   Problem Relation Age of Onset   • Other Mother      Lupus   • Sleep Apnea Mother    •  Heart Disease Father    • Hypertension Father    • Sleep Apnea Father    • Heart Disease Paternal Uncle    • Heart Disease Paternal Grandfather      Social History     Social History   • Marital status:      Spouse name: N/A   • Number of children: N/A   • Years of education: N/A     Occupational History   • Not on file.     Social History Main Topics   • Smoking status: Current Every Day Smoker     Packs/day: 0.50     Years: 22.00     Types: Cigarettes   • Smokeless tobacco: Never Used   • Alcohol use No   • Drug use: Yes     Types: Inhaled      Comment: marijuana occ   • Sexual activity: Yes     Partners: Female     Other Topics Concern   • Not on file     Social History Narrative   • No narrative on file     Allergies   Allergen Reactions   • Fish Anaphylaxis     Outpatient Encounter Prescriptions as of 5/16/2018   Medication Sig Dispense Refill   • apixaban (ELIQUIS) 5mg Tab Take 1 Tab by mouth 2 Times a Day. 60 Tab 0   • pantoprazole (PROTONIX) 40 MG Tablet Delayed Response Take 40 mg by mouth.     • amitriptyline (ELAVIL) 100 MG Tab Take 100 mg by mouth every evening.     • simvastatin (ZOCOR) 10 MG Tab Take 10 mg by mouth every evening.     • ranitidine (ZANTAC) 150 MG Tab Take 1 Tab by mouth 2 times a day. 60 Tab 3   • ondansetron (ZOFRAN ODT) 4 MG TBDP Take 1 Tab by mouth every 8 hours as needed for Nausea/Vomiting. 15 Tab 1   • predniSONE (DELTASONE) 20 MG Tab Take two tablets daily x 5 days. 10 Tab 0     No facility-administered encounter medications on file as of 5/16/2018.      Review of Systems   Constitutional: Positive for malaise/fatigue. Negative for chills and fever.   HENT: Positive for congestion and hearing loss. Negative for sore throat.    Eyes: Positive for redness. Negative for blurred vision.   Respiratory: Positive for shortness of breath. Negative for cough.    Cardiovascular: Negative for chest pain, palpitations, claudication, leg swelling and PND.   Gastrointestinal: Positive  "for abdominal pain, heartburn, nausea and vomiting.   Musculoskeletal: Positive for joint pain. Negative for falls.   Skin: Negative for rash.   Neurological: Positive for headaches. Negative for dizziness, focal weakness and weakness.   Endo/Heme/Allergies: Does not bruise/bleed easily.        Objective:   /80   Pulse (!) 104   Ht 1.727 m (5' 8\")   Wt 88.5 kg (195 lb)   SpO2 97%   BMI 29.65 kg/m²      Physical Exam   Constitutional: No distress.   HENT:   Mouth/Throat: Oropharynx is clear and moist. No oropharyngeal exudate.   Eyes: No scleral icterus.   Neck: No JVD present.   Cardiovascular: Normal rate and normal heart sounds.  Exam reveals no gallop and no friction rub.    No murmur heard.  Pulmonary/Chest: No respiratory distress. He has no wheezes. He has no rales.   Abdominal: Soft. Bowel sounds are normal.   Musculoskeletal: He exhibits no edema.   Neurological: He is alert.   Skin: No rash noted. He is not diaphoretic.   Psychiatric: He has a normal mood and affect.     EKG today shows sinus tachycardia    I do not have the formal report of his echocardiogram but his right heart size and estimate of RVSP were commented as normal in his pulmonary office follow-up visits    Labs reviewed he has mild dyslipidemia   Assessment:     1. Heart rate fast  EKG   2. Tachycardia     3. Family history of MI (myocardial infarction)     4. Dyslipidemia     5. Other chronic pulmonary embolism without acute cor pulmonale (HCC)         Medical Decision Making:  Today's Assessment / Status / Plan:     It was my pleasure to meet with Mr. Cardoza.    His sinus tachycardia is likely multifactorial could be due to of Elavil and his cyclic vomiting syndrome as a baseline and having had pulmonary embolism this year certainly can be associated with sinus tachycardia we discussed available options for workup and treatment I think overall he would prefer conservative therapy and continue to monitor certainly can " try metoprolol if he finds that heart rate being elevated over time is worrisome.  We also discussed he could also do a 24-hour Holter monitor if necessary to have a better understanding of his average heart rate in response to activities as well as sleep    He will complete his course of therapy for his unprovoked pulmonary embolism    Discussed is quite reasonable take low-dose statin therapy given his family history and dyslipidemia    Mr. Cardoza does not require regular cardiology follow up, I have advised him to call our office or e-mail using my MyChart if needed.    It is my pleasure to participate in the care of Mr. Cardoza.  Please do not hesitate to contact me with questions or concerns.    Ray Almanza MD PhD [facility]  Cardiologist [facility]        Santana Perea M.D.  [address] [facility] [address]  VIA Facsimile: [phone]     Andrea Machado, P.A.-C.  [address]  [address]  [facility] [address]  VIA In Basket

## 2018-05-17 LAB — EKG IMPRESSION: NORMAL

## 2018-05-20 ENCOUNTER — APPOINTMENT (OUTPATIENT)
Dept: RADIOLOGY | Facility: MEDICAL CENTER | Age: 43
End: 2018-05-20
Attending: EMERGENCY MEDICINE
Payer: COMMERCIAL

## 2018-05-20 ENCOUNTER — HOSPITAL ENCOUNTER (OUTPATIENT)
Facility: MEDICAL CENTER | Age: 43
End: 2018-05-22
Attending: EMERGENCY MEDICINE | Admitting: INTERNAL MEDICINE
Payer: COMMERCIAL

## 2018-05-20 ENCOUNTER — APPOINTMENT (OUTPATIENT)
Dept: RADIOLOGY | Facility: MEDICAL CENTER | Age: 43
End: 2018-05-20
Payer: COMMERCIAL

## 2018-05-20 DIAGNOSIS — R11.15 INTRACTABLE CYCLICAL VOMITING WITH NAUSEA: ICD-10-CM

## 2018-05-20 DIAGNOSIS — R07.9 CHEST PAIN, UNSPECIFIED TYPE: ICD-10-CM

## 2018-05-20 PROBLEM — Z86.711 HISTORY OF PULMONARY EMBOLISM: Status: ACTIVE | Noted: 2018-01-30

## 2018-05-20 PROBLEM — R73.9 HYPERGLYCEMIA: Status: ACTIVE | Noted: 2018-05-20

## 2018-05-20 PROBLEM — D69.6 THROMBOCYTOPENIA (HCC): Status: ACTIVE | Noted: 2018-05-20

## 2018-05-20 PROBLEM — E66.9 OBESITY (BMI 30-39.9): Status: RESOLVED | Noted: 2018-01-30 | Resolved: 2018-05-20

## 2018-05-20 PROBLEM — R07.89 OTHER CHEST PAIN: Status: ACTIVE | Noted: 2018-05-20

## 2018-05-20 PROBLEM — K21.00 GASTROESOPHAGEAL REFLUX DISEASE WITH ESOPHAGITIS: Status: ACTIVE | Noted: 2018-01-30

## 2018-05-20 LAB
ALBUMIN SERPL BCP-MCNC: 5 G/DL (ref 3.2–4.9)
ALBUMIN/GLOB SERPL: 1.6 G/DL
ALP SERPL-CCNC: 98 U/L (ref 30–99)
ALT SERPL-CCNC: 30 U/L (ref 2–50)
ANION GAP SERPL CALC-SCNC: 13 MMOL/L (ref 0–11.9)
APTT PPP: 24.5 SEC (ref 24.7–36)
AST SERPL-CCNC: 31 U/L (ref 12–45)
BASOPHILS # BLD AUTO: 0.2 % (ref 0–1.8)
BASOPHILS # BLD: 0.03 K/UL (ref 0–0.12)
BILIRUB SERPL-MCNC: 0.6 MG/DL (ref 0.1–1.5)
BNP SERPL-MCNC: 42 PG/ML (ref 0–100)
BUN SERPL-MCNC: 16 MG/DL (ref 8–22)
CALCIUM SERPL-MCNC: 9.7 MG/DL (ref 8.4–10.2)
CHLORIDE SERPL-SCNC: 105 MMOL/L (ref 96–112)
CO2 SERPL-SCNC: 20 MMOL/L (ref 20–33)
CREAT SERPL-MCNC: 0.97 MG/DL (ref 0.5–1.4)
EKG IMPRESSION: NORMAL
EOSINOPHIL # BLD AUTO: 0 K/UL (ref 0–0.51)
EOSINOPHIL NFR BLD: 0 % (ref 0–6.9)
ERYTHROCYTE [DISTWIDTH] IN BLOOD BY AUTOMATED COUNT: 39.1 FL (ref 35.9–50)
EST. AVERAGE GLUCOSE BLD GHB EST-MCNC: 120 MG/DL
GLOBULIN SER CALC-MCNC: 3.1 G/DL (ref 1.9–3.5)
GLUCOSE SERPL-MCNC: 167 MG/DL (ref 65–99)
HBA1C MFR BLD: 5.8 % (ref 0–5.6)
HCT VFR BLD AUTO: 47.2 % (ref 42–52)
HGB BLD-MCNC: 16.4 G/DL (ref 14–18)
IMM GRANULOCYTES # BLD AUTO: 0.16 K/UL (ref 0–0.11)
IMM GRANULOCYTES NFR BLD AUTO: 0.9 % (ref 0–0.9)
INR PPP: 0.88 (ref 0.87–1.13)
LIPASE SERPL-CCNC: 19 U/L (ref 7–58)
LYMPHOCYTES # BLD AUTO: 1.4 K/UL (ref 1–4.8)
LYMPHOCYTES NFR BLD: 7.9 % (ref 22–41)
MAGNESIUM SERPL-MCNC: 1.9 MG/DL (ref 1.5–2.5)
MCH RBC QN AUTO: 29 PG (ref 27–33)
MCHC RBC AUTO-ENTMCNC: 34.7 G/DL (ref 33.7–35.3)
MCV RBC AUTO: 83.5 FL (ref 81.4–97.8)
MONOCYTES # BLD AUTO: 0.62 K/UL (ref 0–0.85)
MONOCYTES NFR BLD AUTO: 3.5 % (ref 0–13.4)
NEUTROPHILS # BLD AUTO: 15.62 K/UL (ref 1.82–7.42)
NEUTROPHILS NFR BLD: 87.5 % (ref 44–72)
NRBC # BLD AUTO: 0 K/UL
NRBC BLD-RTO: 0 /100 WBC
PHOSPHATE SERPL-MCNC: 1.9 MG/DL (ref 2.5–4.5)
PLATELET # BLD AUTO: 121 K/UL (ref 164–446)
PMV BLD AUTO: 9.9 FL (ref 9–12.9)
POTASSIUM SERPL-SCNC: 4 MMOL/L (ref 3.6–5.5)
PROT SERPL-MCNC: 8.1 G/DL (ref 6–8.2)
PROTHROMBIN TIME: 11.9 SEC (ref 12–14.6)
RBC # BLD AUTO: 5.65 M/UL (ref 4.7–6.1)
SODIUM SERPL-SCNC: 138 MMOL/L (ref 135–145)
TROPONIN I SERPL-MCNC: <0.02 NG/ML (ref 0–0.04)
TROPONIN I SERPL-MCNC: <0.02 NG/ML (ref 0–0.04)
TSH SERPL DL<=0.005 MIU/L-ACNC: 0.58 UIU/ML (ref 0.38–5.33)
WBC # BLD AUTO: 17.8 K/UL (ref 4.8–10.8)

## 2018-05-20 PROCEDURE — 83690 ASSAY OF LIPASE: CPT

## 2018-05-20 PROCEDURE — 700102 HCHG RX REV CODE 250 W/ 637 OVERRIDE(OP): Performed by: INTERNAL MEDICINE

## 2018-05-20 PROCEDURE — 700101 HCHG RX REV CODE 250: Performed by: INTERNAL MEDICINE

## 2018-05-20 PROCEDURE — 71045 X-RAY EXAM CHEST 1 VIEW: CPT

## 2018-05-20 PROCEDURE — 700102 HCHG RX REV CODE 250 W/ 637 OVERRIDE(OP)

## 2018-05-20 PROCEDURE — 83735 ASSAY OF MAGNESIUM: CPT

## 2018-05-20 PROCEDURE — 304561 HCHG STAT O2

## 2018-05-20 PROCEDURE — A9270 NON-COVERED ITEM OR SERVICE: HCPCS

## 2018-05-20 PROCEDURE — 83880 ASSAY OF NATRIURETIC PEPTIDE: CPT

## 2018-05-20 PROCEDURE — 700111 HCHG RX REV CODE 636 W/ 250 OVERRIDE (IP)

## 2018-05-20 PROCEDURE — 36415 COLL VENOUS BLD VENIPUNCTURE: CPT

## 2018-05-20 PROCEDURE — 96374 THER/PROPH/DIAG INJ IV PUSH: CPT

## 2018-05-20 PROCEDURE — 700117 HCHG RX CONTRAST REV CODE 255: Performed by: EMERGENCY MEDICINE

## 2018-05-20 PROCEDURE — 700105 HCHG RX REV CODE 258: Performed by: EMERGENCY MEDICINE

## 2018-05-20 PROCEDURE — 700105 HCHG RX REV CODE 258: Performed by: INTERNAL MEDICINE

## 2018-05-20 PROCEDURE — 93005 ELECTROCARDIOGRAM TRACING: CPT

## 2018-05-20 PROCEDURE — 85610 PROTHROMBIN TIME: CPT

## 2018-05-20 PROCEDURE — 80053 COMPREHEN METABOLIC PANEL: CPT

## 2018-05-20 PROCEDURE — 84443 ASSAY THYROID STIM HORMONE: CPT

## 2018-05-20 PROCEDURE — 74177 CT ABD & PELVIS W/CONTRAST: CPT

## 2018-05-20 PROCEDURE — 96375 TX/PRO/DX INJ NEW DRUG ADDON: CPT

## 2018-05-20 PROCEDURE — C9113 INJ PANTOPRAZOLE SODIUM, VIA: HCPCS

## 2018-05-20 PROCEDURE — 93005 ELECTROCARDIOGRAM TRACING: CPT | Performed by: EMERGENCY MEDICINE

## 2018-05-20 PROCEDURE — 83036 HEMOGLOBIN GLYCOSYLATED A1C: CPT

## 2018-05-20 PROCEDURE — 700111 HCHG RX REV CODE 636 W/ 250 OVERRIDE (IP): Performed by: EMERGENCY MEDICINE

## 2018-05-20 PROCEDURE — 85730 THROMBOPLASTIN TIME PARTIAL: CPT

## 2018-05-20 PROCEDURE — 99220 PR INITIAL OBSERVATION CARE,LEVL III: CPT | Performed by: INTERNAL MEDICINE

## 2018-05-20 PROCEDURE — 84484 ASSAY OF TROPONIN QUANT: CPT | Mod: 91

## 2018-05-20 PROCEDURE — 85025 COMPLETE CBC W/AUTO DIFF WBC: CPT

## 2018-05-20 PROCEDURE — 99285 EMERGENCY DEPT VISIT HI MDM: CPT

## 2018-05-20 PROCEDURE — 700111 HCHG RX REV CODE 636 W/ 250 OVERRIDE (IP): Performed by: INTERNAL MEDICINE

## 2018-05-20 PROCEDURE — G0378 HOSPITAL OBSERVATION PER HR: HCPCS

## 2018-05-20 PROCEDURE — A9270 NON-COVERED ITEM OR SERVICE: HCPCS | Performed by: INTERNAL MEDICINE

## 2018-05-20 PROCEDURE — 71275 CT ANGIOGRAPHY CHEST: CPT

## 2018-05-20 PROCEDURE — 84100 ASSAY OF PHOSPHORUS: CPT

## 2018-05-20 RX ORDER — AMOXICILLIN 250 MG
2 CAPSULE ORAL 2 TIMES DAILY
Status: DISCONTINUED | OUTPATIENT
Start: 2018-05-20 | End: 2018-05-22 | Stop reason: HOSPADM

## 2018-05-20 RX ORDER — ONDANSETRON 2 MG/ML
8 INJECTION INTRAMUSCULAR; INTRAVENOUS ONCE
Status: COMPLETED | OUTPATIENT
Start: 2018-05-20 | End: 2018-05-20

## 2018-05-20 RX ORDER — NICOTINE 21 MG/24HR
14 PATCH, TRANSDERMAL 24 HOURS TRANSDERMAL
Status: DISCONTINUED | OUTPATIENT
Start: 2018-05-20 | End: 2018-05-22 | Stop reason: HOSPADM

## 2018-05-20 RX ORDER — OMEPRAZOLE 20 MG/1
20 CAPSULE, DELAYED RELEASE ORAL 2 TIMES DAILY
Status: DISCONTINUED | OUTPATIENT
Start: 2018-05-21 | End: 2018-05-22 | Stop reason: HOSPADM

## 2018-05-20 RX ORDER — LABETALOL HYDROCHLORIDE 5 MG/ML
10 INJECTION, SOLUTION INTRAVENOUS EVERY 4 HOURS PRN
Status: DISCONTINUED | OUTPATIENT
Start: 2018-05-20 | End: 2018-05-22 | Stop reason: HOSPADM

## 2018-05-20 RX ORDER — AMITRIPTYLINE HYDROCHLORIDE 50 MG/1
100 TABLET, FILM COATED ORAL NIGHTLY
Status: DISCONTINUED | OUTPATIENT
Start: 2018-05-20 | End: 2018-05-22 | Stop reason: HOSPADM

## 2018-05-20 RX ORDER — DEXTROSE MONOHYDRATE, SODIUM CHLORIDE, AND POTASSIUM CHLORIDE 50; 1.49; 9 G/1000ML; G/1000ML; G/1000ML
INJECTION, SOLUTION INTRAVENOUS CONTINUOUS
Status: DISCONTINUED | OUTPATIENT
Start: 2018-05-20 | End: 2018-05-21

## 2018-05-20 RX ORDER — ONDANSETRON 4 MG/1
4 TABLET, ORALLY DISINTEGRATING ORAL EVERY 4 HOURS PRN
Status: DISCONTINUED | OUTPATIENT
Start: 2018-05-20 | End: 2018-05-22 | Stop reason: HOSPADM

## 2018-05-20 RX ORDER — SODIUM CHLORIDE 9 MG/ML
500 INJECTION, SOLUTION INTRAVENOUS ONCE
Status: ACTIVE | OUTPATIENT
Start: 2018-05-21 | End: 2018-05-22

## 2018-05-20 RX ORDER — SODIUM CHLORIDE 9 MG/ML
1000 INJECTION, SOLUTION INTRAVENOUS ONCE
Status: COMPLETED | OUTPATIENT
Start: 2018-05-20 | End: 2018-05-20

## 2018-05-20 RX ORDER — ACETAMINOPHEN 325 MG/1
650 TABLET ORAL EVERY 6 HOURS PRN
Status: DISCONTINUED | OUTPATIENT
Start: 2018-05-20 | End: 2018-05-22 | Stop reason: HOSPADM

## 2018-05-20 RX ORDER — HALOPERIDOL 5 MG/ML
5 INJECTION INTRAMUSCULAR ONCE
Status: COMPLETED | OUTPATIENT
Start: 2018-05-20 | End: 2018-05-20

## 2018-05-20 RX ORDER — ONDANSETRON 2 MG/ML
4 INJECTION INTRAMUSCULAR; INTRAVENOUS EVERY 4 HOURS PRN
Status: DISCONTINUED | OUTPATIENT
Start: 2018-05-20 | End: 2018-05-22 | Stop reason: HOSPADM

## 2018-05-20 RX ORDER — SIMVASTATIN 10 MG
10 TABLET ORAL NIGHTLY
Status: DISCONTINUED | OUTPATIENT
Start: 2018-05-20 | End: 2018-05-22 | Stop reason: HOSPADM

## 2018-05-20 RX ORDER — PANTOPRAZOLE SODIUM 40 MG/10ML
40 INJECTION, POWDER, LYOPHILIZED, FOR SOLUTION INTRAVENOUS ONCE
Status: COMPLETED | OUTPATIENT
Start: 2018-05-20 | End: 2018-05-20

## 2018-05-20 RX ORDER — POLYETHYLENE GLYCOL 3350 17 G/17G
1 POWDER, FOR SOLUTION ORAL
Status: DISCONTINUED | OUTPATIENT
Start: 2018-05-20 | End: 2018-05-22 | Stop reason: HOSPADM

## 2018-05-20 RX ORDER — BISACODYL 10 MG
10 SUPPOSITORY, RECTAL RECTAL
Status: DISCONTINUED | OUTPATIENT
Start: 2018-05-20 | End: 2018-05-22 | Stop reason: HOSPADM

## 2018-05-20 RX ORDER — PROMETHAZINE HYDROCHLORIDE 25 MG/1
12.5-25 SUPPOSITORY RECTAL EVERY 4 HOURS PRN
Status: DISCONTINUED | OUTPATIENT
Start: 2018-05-20 | End: 2018-05-22 | Stop reason: HOSPADM

## 2018-05-20 RX ORDER — PROMETHAZINE HYDROCHLORIDE 25 MG/1
12.5-25 TABLET ORAL EVERY 4 HOURS PRN
Status: DISCONTINUED | OUTPATIENT
Start: 2018-05-20 | End: 2018-05-22 | Stop reason: HOSPADM

## 2018-05-20 RX ORDER — ONDANSETRON 4 MG/1
4 TABLET, ORALLY DISINTEGRATING ORAL EVERY 8 HOURS PRN
COMMUNITY
End: 2018-05-24

## 2018-05-20 RX ORDER — LORAZEPAM 2 MG/ML
0.5 INJECTION INTRAMUSCULAR EVERY 6 HOURS PRN
Status: DISCONTINUED | OUTPATIENT
Start: 2018-05-20 | End: 2018-05-22 | Stop reason: HOSPADM

## 2018-05-20 RX ORDER — PANTOPRAZOLE SODIUM 40 MG/10ML
INJECTION, POWDER, LYOPHILIZED, FOR SOLUTION INTRAVENOUS
Status: COMPLETED
Start: 2018-05-20 | End: 2018-05-20

## 2018-05-20 RX ORDER — DIPHENHYDRAMINE HYDROCHLORIDE 50 MG/ML
25 INJECTION INTRAMUSCULAR; INTRAVENOUS ONCE
Status: COMPLETED | OUTPATIENT
Start: 2018-05-20 | End: 2018-05-20

## 2018-05-20 RX ORDER — LORAZEPAM 1 MG/1
0.5 TABLET ORAL EVERY 6 HOURS PRN
Status: DISCONTINUED | OUTPATIENT
Start: 2018-05-20 | End: 2018-05-22 | Stop reason: HOSPADM

## 2018-05-20 RX ORDER — SUCRALFATE ORAL 1 G/10ML
1 SUSPENSION ORAL EVERY 6 HOURS
Status: DISCONTINUED | OUTPATIENT
Start: 2018-05-20 | End: 2018-05-22 | Stop reason: HOSPADM

## 2018-05-20 RX ORDER — PROMETHAZINE HYDROCHLORIDE 25 MG/1
25 TABLET ORAL EVERY 6 HOURS PRN
COMMUNITY
End: 2018-05-24 | Stop reason: SDUPTHER

## 2018-05-20 RX ADMIN — DIPHENHYDRAMINE HYDROCHLORIDE 25 MG: 50 INJECTION, SOLUTION INTRAMUSCULAR; INTRAVENOUS at 14:38

## 2018-05-20 RX ADMIN — PROCHLORPERAZINE EDISYLATE 10 MG: 5 INJECTION INTRAMUSCULAR; INTRAVENOUS at 23:47

## 2018-05-20 RX ADMIN — LIDOCAINE HYDROCHLORIDE 30 ML: 20 SOLUTION OROPHARYNGEAL at 17:17

## 2018-05-20 RX ADMIN — PANTOPRAZOLE SODIUM 40 MG: 40 INJECTION, POWDER, LYOPHILIZED, FOR SOLUTION INTRAVENOUS at 17:17

## 2018-05-20 RX ADMIN — IOHEXOL 100 ML: 350 INJECTION, SOLUTION INTRAVENOUS at 15:56

## 2018-05-20 RX ADMIN — HALOPERIDOL LACTATE 5 MG: 5 INJECTION, SOLUTION INTRAMUSCULAR at 14:35

## 2018-05-20 RX ADMIN — SODIUM CHLORIDE 1000 ML: 9 INJECTION, SOLUTION INTRAVENOUS at 17:17

## 2018-05-20 RX ADMIN — SODIUM CHLORIDE 1000 ML: 9 INJECTION, SOLUTION INTRAVENOUS at 14:35

## 2018-05-20 RX ADMIN — ONDANSETRON 8 MG: 2 INJECTION INTRAMUSCULAR; INTRAVENOUS at 14:34

## 2018-05-20 RX ADMIN — POTASSIUM CHLORIDE, DEXTROSE MONOHYDRATE AND SODIUM CHLORIDE: 150; 5; 900 INJECTION, SOLUTION INTRAVENOUS at 20:24

## 2018-05-20 RX ADMIN — Medication 30 ML: at 17:17

## 2018-05-20 RX ADMIN — SUCRALFATE 1 G: 1 SUSPENSION ORAL at 18:41

## 2018-05-20 RX ADMIN — FAMOTIDINE 20 MG: 10 INJECTION, SOLUTION INTRAVENOUS at 20:24

## 2018-05-20 RX ADMIN — NICOTINE 14 MG: 14 PATCH, EXTENDED RELEASE TRANSDERMAL at 18:41

## 2018-05-20 RX ADMIN — ONDANSETRON 8 MG: 2 INJECTION INTRAMUSCULAR; INTRAVENOUS at 17:04

## 2018-05-20 ASSESSMENT — ENCOUNTER SYMPTOMS
PND: 0
FOCAL WEAKNESS: 0
HEARTBURN: 0
NECK PAIN: 0
PALPITATIONS: 0
SEIZURES: 0
STRIDOR: 0
DIAPHORESIS: 0
NAUSEA: 1
EYE PAIN: 0
COUGH: 0
EYE DISCHARGE: 0
SHORTNESS OF BREATH: 0
CHILLS: 0
WHEEZING: 0
DIZZINESS: 0
HALLUCINATIONS: 0
HEADACHES: 0
FALLS: 0
DIARRHEA: 0
SENSORY CHANGE: 0
BACK PAIN: 0
DOUBLE VISION: 0
SINUS PAIN: 0
INSOMNIA: 1
WEAKNESS: 0
SPEECH CHANGE: 0
BLURRED VISION: 0
TINGLING: 0
CLAUDICATION: 0
ORTHOPNEA: 0
FEVER: 0
PHOTOPHOBIA: 0
MYALGIAS: 0
EYE REDNESS: 0
DEPRESSION: 0
BLOOD IN STOOL: 0
FLANK PAIN: 0
NERVOUS/ANXIOUS: 1
CONSTIPATION: 0
MEMORY LOSS: 0
SORE THROAT: 0
VOMITING: 1
WEIGHT LOSS: 0
LOSS OF CONSCIOUSNESS: 0
HEMOPTYSIS: 0
ABDOMINAL PAIN: 0
SPUTUM PRODUCTION: 0
TREMORS: 0

## 2018-05-20 ASSESSMENT — PATIENT HEALTH QUESTIONNAIRE - PHQ9
SUM OF ALL RESPONSES TO PHQ9 QUESTIONS 1 AND 2: 0
2. FEELING DOWN, DEPRESSED, IRRITABLE, OR HOPELESS: NOT AT ALL
1. LITTLE INTEREST OR PLEASURE IN DOING THINGS: NOT AT ALL

## 2018-05-20 ASSESSMENT — LIFESTYLE VARIABLES
SUBSTANCE_ABUSE: 1
EVER_SMOKED: YES
ALCOHOL_USE: NO

## 2018-05-20 ASSESSMENT — PAIN SCALES - GENERAL
PAINLEVEL_OUTOF10: 10
PAINLEVEL_OUTOF10: 4
PAINLEVEL_OUTOF10: 8

## 2018-05-20 NOTE — ED NOTES
Med rec updated and complete  Allergies reviewed  Interviewed pt with family at bedside with permission from pt.  Pts wife reports no antibiotics in the last 30 days.  Pt reports no vitamins.

## 2018-05-20 NOTE — ED NOTES
"Chief Complaint   Patient presents with   • Chest Pain     started one hour PTA   • N/V     \"all morning\"   • Anxiety     hyperventilating, restless     /88   Pulse 90   Resp (!) 24   SpO2 96%     "

## 2018-05-20 NOTE — ED PROVIDER NOTES
"ED Provider Note    CHIEF COMPLAINT  Chief Complaint   Patient presents with   • Chest Pain     started one hour PTA   • N/V     \"all morning\"   • Anxiety     hyperventilating, restless       HPI  Jerome Cardoza Jr. is a 43 y.o. male who presents to the emergency department complaining of nausea and vomiting all morning long and over the last 1-2 hours left-sided chest pain. The patient has a history of cyclic vomiting and he has tried Zofran and Phenergan at home without relief this morning and he says that this episode of vomiting is similar to previous episodes he doesn't recognize any precipitating events. What is different today as he is having some left-sided chest discomfort and he has a history of pulmonary emboli so he is worried about a recurrent pulmonary embolus. The patient says that he does take eliquis    REVIEW OF SYSTEMS no shortness of breath no hemoptysis he does have generalized abdominal discomfort. No diarrhea. All other systems negative    PAST MEDICAL HISTORY  Past Medical History:   Diagnosis Date   • Bronchitis    • Chickenpox    • GERD (gastroesophageal reflux disease)    • IBS (irritable bowel syndrome)    • Indigestion    • Influenza    • Pneumonia    • Pulmonary embolism (HCC)    • Renal disorder     Acute Renal Failure from dehydration in the past   • Sleep apnea        FAMILY HISTORY  Family History   Problem Relation Age of Onset   • Other Mother      Lupus   • Sleep Apnea Mother    • Heart Disease Father    • Hypertension Father    • Sleep Apnea Father    • Heart Disease Paternal Uncle    • Heart Disease Paternal Grandfather        SOCIAL HISTORY  Social History     Social History   • Marital status:      Spouse name: N/A   • Number of children: N/A   • Years of education: N/A     Social History Main Topics   • Smoking status: Current Every Day Smoker     Packs/day: 0.50     Years: 22.00     Types: Cigarettes   • Smokeless tobacco: Never Used   • Alcohol use No   • " "Drug use: Yes     Types: Inhaled      Comment: marijuana occ   • Sexual activity: Yes     Partners: Female     Other Topics Concern   • Not on file     Social History Narrative   • No narrative on file       SURGICAL HISTORY  Past Surgical History:   Procedure Laterality Date   • KNEE REPLACEMENT, TOTAL Left 10/2017   • LENORA BY LAPAROSCOPY      Cholecystectomy, Laparoscopic   • OTHER ORTHOPEDIC SURGERY     • TONSILLECTOMY     • TONSILLECTOMY         CURRENT MEDICATIONS  Home Medications     Reviewed by Lj Bansal (Pharmacy Tech) on 05/20/18 at 1421  Med List Status: Complete   Medication Last Dose Status   amitriptyline (ELAVIL) 100 MG Tab 5/19/2018 Active   apixaban (ELIQUIS) 5mg Tab 5/19/2018 Active   ondansetron (ZOFRAN ODT) 4 MG TABLET DISPERSIBLE 5/20/2018 Active   pantoprazole (PROTONIX) 40 MG Tablet Delayed Response 5/19/2018 Active   promethazine (PHENERGAN) 25 MG Tab 5/20/2018 Active   ranitidine (ZANTAC) 150 MG Tab 5/19/2018 Active   simvastatin (ZOCOR) 10 MG Tab 5/19/2018 Active                ALLERGIES  Allergies   Allergen Reactions   • Fish Anaphylaxis       PHYSICAL EXAM  VITAL SIGNS: /88   Pulse (!) 104   Temp 37.2 °C (98.9 °F)   Resp 19   Ht 1.727 m (5' 8\")   Wt 88.6 kg (195 lb 5.2 oz)   SpO2 92%   BMI 29.70 kg/m²    Oxygen saturation is interpreted as adequate  Constitutional: Awake and actively vomiting and uncomfortable appearing  HENT: Mucous membranes moist  Eyes: No erythema or discharge or jaundice  Neck: Trachea midline no JVD  Cardiovascular: Regular tachycardia  Lungs: Clear and equal bilaterally  Abdomen/Back: Softly distended and diffusely tender but no focal tenderness or peritoneal findings  Skin: Warm and diaphoretic  Musculoskeletal: No acute bony deformity  Neurologic: Awake verbal moving all extremities    Laboratory  CBC showed an elevated white blood cell count of 17.8 and the medical record shows that the patient has had similar elevations of the white " blood cell counts with exacerbation of his chronic cyclic vomiting. Hemoglobin is adequate at 16.4 basic metabolic panel is unremarkable except for slightly elevated blood sugar LFTs and lipase are normal troponin and BNP are normal INR 0.88    EKG interpretation  12-lead EKG shows sinus rhythm 116 bpm there is no pathologic ST elevation or depression or ectopy NJ interval is 134 ms and the QTc interval is 480 ms    Radiology  CT-ABDOMEN-PELVIS WITH   Final Result      No acute abnormalities are noted on abdominal CT.  No acute abnormalities are identified on pelvic CT.         CT-CTA CHEST PULMONARY ARTERY W/ RECONS   Final Result         1. No CT evidence of pulmonary embolism.      2. Minimal basilar opacities, likely atelectasis. No pleural effusion. No pneumothorax.      3. Mild wall thickening of the distal esophagus could be seen with esophagitis. Mucosal lesion cannot be entirely excluded.      DX-CHEST-LIMITED (1 VIEW)   Final Result         1.  Findings suggest minimal perihilar and interstitial opacifications in each lung which could indicate edema or inflammation such as bronchitis or pneumonitis.      2.  No consolidations identified.        MEDICAL DECISION MAKING and DISPOSITION  In the emergency department an IV was established the patient was placed on the cardiac monitor he was given intravenous fluids as well as intravenous Zofran and Haldol and Benadryl and these measures have been helpful but he is still feeling ill and does not think that he can tolerate oral intake or go home and the patient remains persistently tachycardic. I reviewed all the findings above with the patient and his wife and I reviewed the case with the hospitalist and the patient will be admitted for further evaluation and treatment     IMPRESSION  1. Intractable vomiting  2. History of cyclic vomiting syndrome  3. Chest pain      Electronically signed by: Tiago Abad, 5/20/2018 4:32 PM

## 2018-05-21 PROBLEM — R05.9 COUGH: Status: ACTIVE | Noted: 2018-05-21

## 2018-05-21 LAB
ALBUMIN SERPL BCP-MCNC: 3.6 G/DL (ref 3.2–4.9)
ALBUMIN/GLOB SERPL: 1.5 G/DL
ALP SERPL-CCNC: 67 U/L (ref 30–99)
ALT SERPL-CCNC: 20 U/L (ref 2–50)
ANION GAP SERPL CALC-SCNC: 6 MMOL/L (ref 0–11.9)
AST SERPL-CCNC: 17 U/L (ref 12–45)
BASOPHILS # BLD AUTO: 0.2 % (ref 0–1.8)
BASOPHILS # BLD: 0.03 K/UL (ref 0–0.12)
BILIRUB SERPL-MCNC: 0.5 MG/DL (ref 0.1–1.5)
BUN SERPL-MCNC: 10 MG/DL (ref 8–22)
CALCIUM SERPL-MCNC: 8.5 MG/DL (ref 8.4–10.2)
CHLORIDE SERPL-SCNC: 111 MMOL/L (ref 96–112)
CO2 SERPL-SCNC: 24 MMOL/L (ref 20–33)
CREAT SERPL-MCNC: 0.9 MG/DL (ref 0.5–1.4)
EOSINOPHIL # BLD AUTO: 0.03 K/UL (ref 0–0.51)
EOSINOPHIL NFR BLD: 0.2 % (ref 0–6.9)
ERYTHROCYTE [DISTWIDTH] IN BLOOD BY AUTOMATED COUNT: 41.4 FL (ref 35.9–50)
GLOBULIN SER CALC-MCNC: 2.4 G/DL (ref 1.9–3.5)
GLUCOSE SERPL-MCNC: 105 MG/DL (ref 65–99)
HCT VFR BLD AUTO: 40 % (ref 42–52)
HGB BLD-MCNC: 13.3 G/DL (ref 14–18)
IMM GRANULOCYTES # BLD AUTO: 0.06 K/UL (ref 0–0.11)
IMM GRANULOCYTES NFR BLD AUTO: 0.3 % (ref 0–0.9)
LYMPHOCYTES # BLD AUTO: 3.79 K/UL (ref 1–4.8)
LYMPHOCYTES NFR BLD: 20.7 % (ref 22–41)
MAGNESIUM SERPL-MCNC: 1.9 MG/DL (ref 1.5–2.5)
MCH RBC QN AUTO: 28.9 PG (ref 27–33)
MCHC RBC AUTO-ENTMCNC: 33.3 G/DL (ref 33.7–35.3)
MCV RBC AUTO: 87 FL (ref 81.4–97.8)
MONOCYTES # BLD AUTO: 1.65 K/UL (ref 0–0.85)
MONOCYTES NFR BLD AUTO: 9 % (ref 0–13.4)
NEUTROPHILS # BLD AUTO: 12.78 K/UL (ref 1.82–7.42)
NEUTROPHILS NFR BLD: 69.6 % (ref 44–72)
NRBC # BLD AUTO: 0 K/UL
NRBC BLD-RTO: 0 /100 WBC
PHOSPHATE SERPL-MCNC: 3.4 MG/DL (ref 2.5–4.5)
PLATELET # BLD AUTO: 84 K/UL (ref 164–446)
PMV BLD AUTO: 9.1 FL (ref 9–12.9)
POTASSIUM SERPL-SCNC: 3.5 MMOL/L (ref 3.6–5.5)
PROT SERPL-MCNC: 6 G/DL (ref 6–8.2)
RBC # BLD AUTO: 4.6 M/UL (ref 4.7–6.1)
SODIUM SERPL-SCNC: 141 MMOL/L (ref 135–145)
TROPONIN I SERPL-MCNC: <0.02 NG/ML (ref 0–0.04)
WBC # BLD AUTO: 18.3 K/UL (ref 4.8–10.8)

## 2018-05-21 PROCEDURE — 700102 HCHG RX REV CODE 250 W/ 637 OVERRIDE(OP)

## 2018-05-21 PROCEDURE — 84100 ASSAY OF PHOSPHORUS: CPT

## 2018-05-21 PROCEDURE — 83735 ASSAY OF MAGNESIUM: CPT

## 2018-05-21 PROCEDURE — A9270 NON-COVERED ITEM OR SERVICE: HCPCS | Performed by: INTERNAL MEDICINE

## 2018-05-21 PROCEDURE — 700101 HCHG RX REV CODE 250: Performed by: INTERNAL MEDICINE

## 2018-05-21 PROCEDURE — 99406 BEHAV CHNG SMOKING 3-10 MIN: CPT

## 2018-05-21 PROCEDURE — 700111 HCHG RX REV CODE 636 W/ 250 OVERRIDE (IP): Performed by: INTERNAL MEDICINE

## 2018-05-21 PROCEDURE — 700102 HCHG RX REV CODE 250 W/ 637 OVERRIDE(OP): Performed by: INTERNAL MEDICINE

## 2018-05-21 PROCEDURE — A9270 NON-COVERED ITEM OR SERVICE: HCPCS | Performed by: HOSPITALIST

## 2018-05-21 PROCEDURE — 96376 TX/PRO/DX INJ SAME DRUG ADON: CPT

## 2018-05-21 PROCEDURE — 700111 HCHG RX REV CODE 636 W/ 250 OVERRIDE (IP): Performed by: HOSPITALIST

## 2018-05-21 PROCEDURE — 84484 ASSAY OF TROPONIN QUANT: CPT

## 2018-05-21 PROCEDURE — A9270 NON-COVERED ITEM OR SERVICE: HCPCS

## 2018-05-21 PROCEDURE — 700102 HCHG RX REV CODE 250 W/ 637 OVERRIDE(OP): Performed by: HOSPITALIST

## 2018-05-21 PROCEDURE — 85025 COMPLETE CBC W/AUTO DIFF WBC: CPT

## 2018-05-21 PROCEDURE — 80053 COMPREHEN METABOLIC PANEL: CPT

## 2018-05-21 PROCEDURE — G0378 HOSPITAL OBSERVATION PER HR: HCPCS

## 2018-05-21 PROCEDURE — 99226 PR SUBSEQUENT OBSERVATION CARE,LEVEL III: CPT | Performed by: HOSPITALIST

## 2018-05-21 PROCEDURE — 94760 N-INVAS EAR/PLS OXIMETRY 1: CPT

## 2018-05-21 RX ORDER — POTASSIUM CHLORIDE 7.45 MG/ML
10 INJECTION INTRAVENOUS
Status: COMPLETED | OUTPATIENT
Start: 2018-05-21 | End: 2018-05-21

## 2018-05-21 RX ORDER — NICOTINE 21 MG/24HR
1 PATCH, TRANSDERMAL 24 HOURS TRANSDERMAL ONCE
Status: COMPLETED | OUTPATIENT
Start: 2018-05-21 | End: 2018-05-22

## 2018-05-21 RX ORDER — POTASSIUM CHLORIDE 20 MEQ/1
40 TABLET, EXTENDED RELEASE ORAL ONCE
Status: DISCONTINUED | OUTPATIENT
Start: 2018-05-21 | End: 2018-05-21

## 2018-05-21 RX ADMIN — POTASSIUM CHLORIDE 10 MEQ: 7.46 INJECTION, SOLUTION INTRAVENOUS at 11:25

## 2018-05-21 RX ADMIN — SUCRALFATE 1 G: 1 SUSPENSION ORAL at 06:27

## 2018-05-21 RX ADMIN — APIXABAN 5 MG: 5 TABLET, FILM COATED ORAL at 09:16

## 2018-05-21 RX ADMIN — SUCRALFATE 1 G: 1 SUSPENSION ORAL at 23:10

## 2018-05-21 RX ADMIN — STANDARDIZED SENNA CONCENTRATE AND DOCUSATE SODIUM 2 TABLET: 8.6; 5 TABLET, FILM COATED ORAL at 20:53

## 2018-05-21 RX ADMIN — Medication 25 MG: at 00:12

## 2018-05-21 RX ADMIN — Medication 25 MG: at 12:23

## 2018-05-21 RX ADMIN — Medication 25 MG: at 23:11

## 2018-05-21 RX ADMIN — SIMVASTATIN 10 MG: 10 TABLET, FILM COATED ORAL at 20:54

## 2018-05-21 RX ADMIN — AMITRIPTYLINE HYDROCHLORIDE 100 MG: 50 TABLET, FILM COATED ORAL at 20:54

## 2018-05-21 RX ADMIN — POTASSIUM CHLORIDE, DEXTROSE MONOHYDRATE AND SODIUM CHLORIDE: 150; 5; 900 INJECTION, SOLUTION INTRAVENOUS at 09:13

## 2018-05-21 RX ADMIN — FAMOTIDINE 20 MG: 10 INJECTION, SOLUTION INTRAVENOUS at 09:15

## 2018-05-21 RX ADMIN — APIXABAN 5 MG: 5 TABLET, FILM COATED ORAL at 20:54

## 2018-05-21 RX ADMIN — SUCRALFATE 1 G: 1 SUSPENSION ORAL at 00:00

## 2018-05-21 RX ADMIN — POTASSIUM CHLORIDE 10 MEQ: 7.46 INJECTION, SOLUTION INTRAVENOUS at 12:26

## 2018-05-21 RX ADMIN — OMEPRAZOLE 20 MG: 20 CAPSULE, DELAYED RELEASE ORAL at 20:55

## 2018-05-21 RX ADMIN — POTASSIUM CHLORIDE 10 MEQ: 7.46 INJECTION, SOLUTION INTRAVENOUS at 10:26

## 2018-05-21 RX ADMIN — SUCRALFATE 1 G: 1 SUSPENSION ORAL at 12:23

## 2018-05-21 RX ADMIN — NICOTINE 14 MG: 14 PATCH, EXTENDED RELEASE TRANSDERMAL at 12:22

## 2018-05-21 RX ADMIN — Medication 25 MG: at 17:02

## 2018-05-21 RX ADMIN — POTASSIUM CHLORIDE 10 MEQ: 7.46 INJECTION, SOLUTION INTRAVENOUS at 09:12

## 2018-05-21 RX ADMIN — LORAZEPAM 0.5 MG: 1 TABLET ORAL at 23:11

## 2018-05-21 RX ADMIN — SUCRALFATE 1 G: 1 SUSPENSION ORAL at 17:01

## 2018-05-21 RX ADMIN — OMEPRAZOLE 20 MG: 20 CAPSULE, DELAYED RELEASE ORAL at 09:16

## 2018-05-21 RX ADMIN — ONDANSETRON 4 MG: 2 INJECTION INTRAMUSCULAR; INTRAVENOUS at 20:51

## 2018-05-21 ASSESSMENT — ENCOUNTER SYMPTOMS
NAUSEA: 1
INSOMNIA: 0
FEVER: 0
SORE THROAT: 0
COUGH: 1
SHORTNESS OF BREATH: 1
ABDOMINAL PAIN: 0
PALPITATIONS: 0
HEADACHES: 0
TINGLING: 0
NECK PAIN: 0
BACK PAIN: 0
BLURRED VISION: 0
EYE PAIN: 0
CHILLS: 0
VOMITING: 0
DEPRESSION: 0
DIZZINESS: 0

## 2018-05-21 ASSESSMENT — COPD QUESTIONNAIRES
COPD SCREENING SCORE: 2
DURING THE PAST 4 WEEKS HOW MUCH DID YOU FEEL SHORT OF BREATH: NONE/LITTLE OF THE TIME
HAVE YOU SMOKED AT LEAST 100 CIGARETTES IN YOUR ENTIRE LIFE: YES
DO YOU EVER COUGH UP ANY MUCUS OR PHLEGM?: NO/ONLY WITH OCCASIONAL COLDS OR INFECTIONS

## 2018-05-21 ASSESSMENT — LIFESTYLE VARIABLES: EVER_SMOKED: YES

## 2018-05-21 ASSESSMENT — PATIENT HEALTH QUESTIONNAIRE - PHQ9
1. LITTLE INTEREST OR PLEASURE IN DOING THINGS: NOT AT ALL
SUM OF ALL RESPONSES TO PHQ9 QUESTIONS 1 AND 2: 0
2. FEELING DOWN, DEPRESSED, IRRITABLE, OR HOPELESS: NOT AT ALL

## 2018-05-21 ASSESSMENT — PAIN SCALES - GENERAL
PAINLEVEL_OUTOF10: 0
PAINLEVEL_OUTOF10: 0
PAINLEVEL_OUTOF10: 2

## 2018-05-21 NOTE — PROGRESS NOTES
Assume care of patient at 1900. He is resting with light off. Wife at bedside. Set up NS bolus to finish running then switch to new fluid orders. He is unable to take po medications at this time d/t severe nausea.

## 2018-05-21 NOTE — ASSESSMENT & PLAN NOTE
Cannabinoid hyperemesis syndrome, history of same in the past  Symptoms developing after using cannabinoids prior to presentation  Admission to hospital for observation  As needed anti emetics ordered  IVF   Counseled and educated regarding cessation of marijuana   Clear liquid diet, advance as tolerated

## 2018-05-21 NOTE — H&P
" Hospital Medicine History and Physical    Date of Service  5/20/2018    Chief Complaint  Chief Complaint   Patient presents with   • Chest Pain     started one hour PTA   • N/V     \"all morning\"   • Anxiety     hyperventilating, restless       History of Presenting Illness  43 y.o. male who presented 5/20/2018 with intractable nausea vomiting and chest pain. Patient has underlying history of cannabinoid hyperemesis syndrome in the past and Randle's esophagus for which he undergoes surveillance endoscopies with his gastroenterologist in [place]. Last EGD one year ago per patient. In addition he has underlying history of pulmonary embolism on anticoagulation with eilquis. He presents to the hospital today with intractable nausea and vomiting. Reports consumption of cannabinoid products last Friday. Reports development of nausea and vomiting since yesterday which has been progressive and severe today. Reports he has vomited profusely a number of time prior to presenting to the emergency room. Denies any blood in his vomitus. In addition he complains of development of chest pain today which is central in location and burning in character. It is 7 out of 10 in intensity. It is substernal in location.This is persistent. He reports that the pain is nonradiating. He reports improvement of his pain whenever he is laying down and worsening with vomiting. Reports slight shortness of breath and cough with his vomiting symptoms. No other abdominal pain. No diarrhea or blood in bowel movements. No other complaints per patient.     Primary Care Physician  Andrea Machado P.A.-C.    Consultants  None    Code Status  FULL CODE     Review of Systems  Review of Systems   Constitutional: Negative for chills, diaphoresis, fever, malaise/fatigue and weight loss.   HENT: Negative for congestion, ear discharge, ear pain, hearing loss, nosebleeds, sinus pain, sore throat and tinnitus.    Eyes: Negative for blurred vision, double " vision, photophobia, pain, discharge and redness.   Respiratory: Negative for cough, hemoptysis, sputum production, shortness of breath, wheezing and stridor.    Cardiovascular: Positive for chest pain. Negative for palpitations, orthopnea, claudication, leg swelling and PND.   Gastrointestinal: Positive for nausea and vomiting. Negative for abdominal pain, blood in stool, constipation, diarrhea, heartburn and melena.   Genitourinary: Negative for dysuria, flank pain, frequency, hematuria and urgency.   Musculoskeletal: Negative for back pain, falls, joint pain, myalgias and neck pain.   Skin: Negative for itching and rash.   Neurological: Negative for dizziness, tingling, tremors, sensory change, speech change, focal weakness, seizures, loss of consciousness, weakness and headaches.   Psychiatric/Behavioral: Positive for substance abuse. Negative for depression, hallucinations, memory loss and suicidal ideas. The patient is nervous/anxious and has insomnia.         Past Medical History  Past Medical History:   Diagnosis Date   • Bronchitis    • Chickenpox    • GERD (gastroesophageal reflux disease)    • IBS (irritable bowel syndrome)    • Indigestion    • Influenza    • Pneumonia    • Pulmonary embolism (HCC)    • Renal disorder     Acute Renal Failure from dehydration in the past   • Sleep apnea        Surgical History  Past Surgical History:   Procedure Laterality Date   • KNEE REPLACEMENT, TOTAL Left 10/2017   • LENORA BY LAPAROSCOPY      Cholecystectomy, Laparoscopic   • OTHER ORTHOPEDIC SURGERY     • TONSILLECTOMY     • TONSILLECTOMY         Medications  No current facility-administered medications on file prior to encounter.      Current Outpatient Prescriptions on File Prior to Encounter   Medication Sig Dispense Refill   • apixaban (ELIQUIS) 5mg Tab Take 1 Tab by mouth 2 Times a Day. 60 Tab 0   • pantoprazole (PROTONIX) 40 MG Tablet Delayed Response Take 40 mg by mouth every evening.     • amitriptyline  (ELAVIL) 100 MG Tab Take 100 mg by mouth every evening.     • simvastatin (ZOCOR) 10 MG Tab Take 10 mg by mouth every evening.     • ranitidine (ZANTAC) 150 MG Tab Take 1 Tab by mouth 2 times a day. 60 Tab 3       Family History  Family History   Problem Relation Age of Onset   • Other Mother      Lupus   • Sleep Apnea Mother    • Heart Disease Father    • Hypertension Father    • Sleep Apnea Father    • Heart Disease Paternal Uncle    • Heart Disease Paternal Grandfather        Social History  Social History   Substance Use Topics   • Smoking status: Current Every Day Smoker     Packs/day: 0.50     Years: 22.00     Types: Cigarettes   • Smokeless tobacco: Never Used   • Alcohol use No       Allergies  Allergies   Allergen Reactions   • Fish Anaphylaxis        Physical Exam  Laboratory   Hemodynamics  Temp (24hrs), Av.2 °C (98.9 °F), Min:37.2 °C (98.9 °F), Max:37.2 °C (98.9 °F)   Temperature: 37.2 °C (98.9 °F)  Pulse  Av.9  Min: 90  Max: 113 Heart Rate (Monitored): (!) 112  Blood Pressure: 145/88, NIBP: 147/84      Respiratory      Respiration: 18, Pulse Oximetry: 93 %             Physical Exam   Constitutional: He is oriented to person, place, and time. He appears well-developed and well-nourished. No distress.   HENT:   Head: Normocephalic.   Mouth/Throat: No oropharyngeal exudate.   Dry mucous membranes   Eyes: Conjunctivae and EOM are normal. Pupils are equal, round, and reactive to light. No scleral icterus.   Neck: Normal range of motion. No JVD present.   Cardiovascular: Regular rhythm and normal heart sounds.  Exam reveals no gallop and no friction rub.    No murmur heard.  Pulses:       Posterior tibial pulses are 2+ on the right side, and 2+ on the left side.   Cap refill < 3s. Tachycardic.    Pulmonary/Chest: Effort normal and breath sounds normal. No stridor. No respiratory distress. He has no wheezes. He has no rales. He exhibits no tenderness.   Abdominal: Soft. Bowel sounds are normal. He  exhibits no distension and no mass. There is no tenderness. There is no rebound and no guarding.   Musculoskeletal: He exhibits no edema, tenderness or deformity.   Neurological: He is alert and oriented to person, place, and time. He has normal reflexes. No cranial nerve deficit.   Skin: Skin is warm and dry. No rash noted. He is not diaphoretic. No erythema. No pallor.   Psychiatric: He has a normal mood and affect. His behavior is normal. Judgment and thought content normal.   Nursing note reviewed.      Recent Labs      05/20/18   1358   WBC  17.8*   RBC  5.65   HEMOGLOBIN  16.4   HEMATOCRIT  47.2   MCV  83.5   MCH  29.0   MCHC  34.7   RDW  39.1   PLATELETCT  121*   MPV  9.9     Recent Labs      05/20/18   1358   SODIUM  138   POTASSIUM  4.0   CHLORIDE  105   CO2  20   GLUCOSE  167*   BUN  16   CREATININE  0.97   CALCIUM  9.7     Recent Labs      05/20/18   1358   ALTSGPT  30   ASTSGOT  31   ALKPHOSPHAT  98   TBILIRUBIN  0.6   LIPASE  19   GLUCOSE  167*     Recent Labs      05/20/18   1358   APTT  24.5*   INR  0.88     Recent Labs      05/20/18   1358   BNPBTYPENAT  42         Lab Results   Component Value Date    TROPONINI <0.02 05/20/2018     Urinalysis:    Lab Results  Component Value Date/Time   SPECGRAVITY 1.020 11/28/2013 1540   GLUCOSEUR Negative 11/28/2013 1540   KETONES Negative 11/28/2013 1540   NITRITE Negative 11/28/2013 1540   WBCURINE 2-5 (A) 11/28/2013 1540   BACTERIA Moderate (A) 11/28/2013 1540   EPITHELCELL Few 11/28/2013 1540        Imaging  Reviewed   Assessment/Plan     I anticipate this patient is appropriate for observation status at this time.    Cyclical vomiting, intractable- (present on admission)   Assessment & Plan    Cannabinoid hyperemesis syndrome, history of same in the past  Symptoms developing after using cannabinoids prior to presentation  Admission to hospital for observation  IV protonix 40 mg x 1, GI Cocktail 30 ml x 1, tomorrow omeprazole 20 mg BID, IV pepid 20 mg BID,  "Sucralfate QID  QT at higher limit of normal  Start scheduled doxylamine 25 mg QID to help with symptoms  As needed anti emetics ordered  IVF hydration   Counseled and educated regarding cessation of marijuana   Clear liquid diet, advance as tolerated  Advised close f/u with his GI doctor following hospital discharge  Advised need for surveillance EGD in outpatient setting with his GI doctor        Other chest pain- (present on admission)   Assessment & Plan    Appears related to esophagitis / GI issues   Aggressive control of N/V/Esophagitis  Admission for observation  Telemetry monitoring, cycle troponin's, serial EKG, CP protocol without ASA   If up trending troponin's consider stress test, if not recommend outpatient GI / Cardiology follow up   Check TSH, A1C        Gastroesophageal reflux disease with esophagitis- (present on admission)   Assessment & Plan    Profound with intractable nausea and vomiting, CP related to esophagitis  IV protonix 40 mg x 1, IV pepcid 20 mg BID, GI Cocktail 30 mg x 1, sucralfate QID  Starting tomorrow omeprazole 20 mg BID with pepcid IV, sucralfate  Needs outpatient EGD with GI team         Hyperglycemia- (present on admission)   Assessment & Plan    Likely reactive from dehydration  Check A1C        Thrombocytopenia (HCC)- (present on admission)   Assessment & Plan    Mild  Monitor        Sinus tachycardia- (present on admission)   Assessment & Plan    Dehydration related  Continue IVF hydration         Tobacco abuse- (present on admission)   Assessment & Plan    Counseled and educated regarding cessation         Randle esophagus- (present on admission)   Assessment & Plan    Undergoes surveillance EGD with his GI doctor in [place]     CT revealing \"Mild wall thickening of the distal esophagus could be seen with esophagitis. Mucosal lesion cannot be entirely excluded\"    Above findings discussed with patient  Patient informed underlying malignancy cannot be ruled out  Patient " advised to f/u with his GI doctor following discharge from the hospital for EGD needs, he verbalized understanding        Dyslipidemia- (present on admission)   Assessment & Plan    Simvastatin         History of pulmonary embolism- (present on admission)   Assessment & Plan    Continue Eliquis  CTA PE negative on presentation         Leukocytosis- (present on admission)   Assessment & Plan    Dehydration related / Reactive   IVF hydration  Repeat CBC in am tomorrow             VTE prophylaxis: Eliquis.

## 2018-05-21 NOTE — CARE PLAN
Problem: Communication  Goal: The ability to communicate needs accurately and effectively will improve  Outcome: PROGRESSING AS EXPECTED  Discussed with patient to express new symptoms and feelings. Pt has been using call light to get ahold of staff.     Problem: Safety  Goal: Will remain free from injury  Outcome: PROGRESSING AS EXPECTED  Discussed with patient to call if need help getting out of bed because pt has IV pole. Pt understood and demonstrated use of unplugging and plugging in IV pole and walking with IV pole.

## 2018-05-21 NOTE — ASSESSMENT & PLAN NOTE
Profound with intractable nausea and vomiting, CP related to esophagitis  IV protonix 40 mg x 1,  Stop  IV pepcid givnen plt issues   Needs outpatient EGD with GI team

## 2018-05-21 NOTE — PROGRESS NOTES
Admission profile completed. Due meds given. Pt AAOx4, spouse at bedside. C/o pain in left chest 6/10, improved from earlier. c/o nausea, also improved from earlier. Pt given due meds for GI upset, will reassess and medicate PRN. Bedside report given to [facility]. POC discussed. Pt resting comfortably in bed. Safety precautions in place. IV fluids from ER infusing (2 L NS).

## 2018-05-21 NOTE — RESPIRATORY CARE
"COPD Education by COPD Clinical Educator  5/21/2018 at 11:30 AM by Shirley Fam    Patient interviewed by COPD education team.  Patient refused full COPD program at this time, but agreed to short intervention.  A comprehensive packet including information about smoking cessation given.  Smoking Cessation Intervention 3 -10 minutes completed.  Provided smoking cessation packet with \"Tips to Quit\" and flyer for \"Free Smoking Cessation Classes\". Provided \"Quit Card\" with the phone number to [facility] for free nicotine replacement therapy.  "

## 2018-05-21 NOTE — PROGRESS NOTES
Tele strip at 0808 shows SR w/ HR of 97  Measurements: .12/.08/.34    Tele Shift Summary:  Rhythm: SR  Rate: 80's-90's  Ectopy: Per CCT Yohana, pt had rare PVC's.    Telemetry monitoring strips placed in pt chart.

## 2018-05-21 NOTE — ASSESSMENT & PLAN NOTE
Appears related to esophagitis / GI issues   Aggressive control of N/V/Esophagitis  Admission for observation  Telemetry monitoring, cycle troponin's, serial EKG, CP protocol without ASA   If up trending troponin's consider stress test, if not recommend outpatient GI / Cardiology follow up   Check TSH, A1C

## 2018-05-21 NOTE — ASSESSMENT & PLAN NOTE
"Undergoes surveillance EGD with his GI doctor in [place]     CT revealing \"Mild wall thickening of the distal esophagus could be seen with esophagitis. Mucosal lesion cannot be entirely excluded\"  ppi   Stop pepcid   "

## 2018-05-21 NOTE — PROGRESS NOTES
[facility] Hospitalist Progress Note    Date of Service: 2018    Chief Complaint  43 y.o. male admitted 2018 with hyperemisis and chest pain with a known history of THC related vomiting in past .    Interval Problem Update  No events over night. Asking to try to eat. No pain  Coughing some and having a little dyspnea.   K replaced    Consultants/Specialty  none    Disposition  Home likely tomorrow if improved        Review of Systems   Constitutional: Negative for chills and fever.   HENT: Negative for sore throat.    Eyes: Negative for blurred vision and pain.   Respiratory: Positive for cough and shortness of breath.    Cardiovascular: Negative for chest pain and palpitations.   Gastrointestinal: Positive for nausea. Negative for abdominal pain and vomiting.   Genitourinary: Negative for dysuria and urgency.   Musculoskeletal: Negative for back pain and neck pain.   Skin: Negative for itching and rash.   Neurological: Negative for dizziness, tingling and headaches.   Psychiatric/Behavioral: Negative for depression. The patient does not have insomnia.    All other systems reviewed and are negative.     Physical Exam  Laboratory/Imaging   Hemodynamics  Temp (24hrs), Av.2 °C (98.9 °F), Min:36.9 °C (98.4 °F), Max:37.5 °C (99.5 °F)   Temperature: 36.9 °C (98.4 °F)  Pulse  Av.6  Min: 90  Max: 118 Heart Rate (Monitored): (!) 112  Blood Pressure: 126/78, NIBP: 147/84      Respiratory      Respiration: 16, Pulse Oximetry: 94 %, O2 Daily Delivery Respiratory : Nasal Cannula     Work Of Breathing / Effort: Mild  RUL Breath Sounds: Clear, RML Breath Sounds: Clear, RLL Breath Sounds: Clear;Diminished, JEFFERY Breath Sounds: Clear, LLL Breath Sounds: Clear;Diminished    Fluids    Intake/Output Summary (Last 24 hours) at 18 0848  Last data filed at 18 0400   Gross per 24 hour   Intake             3000 ml   Output                0 ml   Net             3000 ml       Nutrition  Orders Placed This Encounter    Procedures   • Diet Order     Standing Status:   Standing     Number of Occurrences:   1     Order Specific Question:   Diet:     Answer:   Clear Liquids - No Red Foods [12]     Physical Exam   Constitutional: He is oriented to person, place, and time. He appears well-developed and well-nourished. No distress.   HENT:   Right Ear: External ear normal.   Left Ear: External ear normal.   Nose: Nose normal.   Eyes: Right eye exhibits no discharge. Left eye exhibits no discharge. No scleral icterus.   Neck: No JVD present. No tracheal deviation present.   Cardiovascular: Normal rate, normal heart sounds and intact distal pulses.    No murmur heard.  Cap refill 2 sec   Pulmonary/Chest: Effort normal. No respiratory distress. He has no wheezes. He has rales.   LLL   Abdominal: Soft. Bowel sounds are normal. He exhibits no distension. There is no tenderness. There is no guarding.   Musculoskeletal: He exhibits no edema or tenderness.   Neurological: He is alert and oriented to person, place, and time.   Skin: Skin is warm and dry. He is not diaphoretic. No erythema.   Normal skin color   Psychiatric: He has a normal mood and affect. His behavior is normal.   Nursing note and vitals reviewed.      Recent Labs      05/20/18   1358  05/21/18   0416   WBC  17.8*  18.3*   RBC  5.65  4.60*   HEMOGLOBIN  16.4  13.3*   HEMATOCRIT  47.2  40.0*   MCV  83.5  87.0   MCH  29.0  28.9   MCHC  34.7  33.3*   RDW  39.1  41.4   PLATELETCT  121*  84*   MPV  9.9  9.1     Recent Labs      05/20/18   1358  05/21/18   0416   SODIUM  138  141   POTASSIUM  4.0  3.5*   CHLORIDE  105  111   CO2  20  24   GLUCOSE  167*  105*   BUN  16  10   CREATININE  0.97  0.90   CALCIUM  9.7  8.5     Recent Labs      05/20/18   1358   APTT  24.5*   INR  0.88     Recent Labs      05/20/18   1358   BNPBTYPENAT  42              Assessment/Plan     Marijuana intoxication, with unspecified complication (HCC)- (present on admission)   Assessment & Plan    Cannabinoid  "hyperemesis syndrome, history of same in the past  Symptoms developing after using cannabinoids prior to presentation  Admission to hospital for observation  As needed anti emetics ordered  IVF   Counseled and educated regarding cessation of marijuana   Clear liquid diet, advance as tolerated          Other chest pain- (present on admission)   Assessment & Plan    Appears related to esophagitis / GI issues   Aggressive control of N/V/Esophagitis  Admission for observation  Telemetry monitoring, cycle troponin's, serial EKG, CP protocol without ASA   If up trending troponin's consider stress test, if not recommend outpatient GI / Cardiology follow up   Check TSH, A1C        Gastroesophageal reflux disease with esophagitis- (present on admission)   Assessment & Plan    Profound with intractable nausea and vomiting, CP related to esophagitis  IV protonix 40 mg x 1,  Stop  IV pepcid givnen plt issues   Needs outpatient EGD with GI team         Cough   Assessment & Plan    Suspect mild aspiration form n/v  LLL crackles  Possible reason for leukocytosis  Avoid abx for now as likely to resolve but trend labs        Hyperglycemia- (present on admission)   Assessment & Plan    reactive        Thrombocytopenia (HCC)- (present on admission)   Assessment & Plan    Worse. Stop pepcid  Trend  No etoh hx  Suspect reactive        Sinus tachycardia- (present on admission)   Assessment & Plan    Dehydration related  Continue IVF hydration         Tobacco abuse- (present on admission)   Assessment & Plan    Counseled and educated regarding cessation         Randle esophagus- (present on admission)   Assessment & Plan    Undergoes surveillance EGD with his GI doctor in [place]     CT revealing \"Mild wall thickening of the distal esophagus could be seen with esophagitis. Mucosal lesion cannot be entirely excluded\"  ppi   Stop pepcid         Dyslipidemia- (present on admission)   Assessment & Plan    Simvastatin         History of " pulmonary embolism- (present on admission)   Assessment & Plan    Continue Eliquis  CTA PE negative on presentation         Leukocytosis- (present on admission)   Assessment & Plan    Dehydration related / Reactive ??  Possible aspiration related  Recheck tomorrow as worse today            Quality-Core Measures   Reviewed items::  EKG reviewed, Radiology images reviewed, Labs reviewed and Medications reviewed  Li catheter::  No Li  DVT prophylaxis - mechanical:  SCDs  Ulcer Prophylaxis::  Yes  Assessed for rehabilitation services:  Patient was assess for and/or received rehabilitation services during this hospitalization

## 2018-05-21 NOTE — PROGRESS NOTES
Received report from [facility] NEGRO. Discussed plan of care and safety measures in place. No further needs at this time.

## 2018-05-21 NOTE — ASSESSMENT & PLAN NOTE
Suspect mild aspiration form n/v  LLL crackles  Possible reason for leukocytosis  Avoid abx for now as likely to resolve but trend labs

## 2018-05-22 VITALS
BODY MASS INDEX: 29.6 KG/M2 | WEIGHT: 195.33 LBS | OXYGEN SATURATION: 91 % | HEIGHT: 68 IN | HEART RATE: 101 BPM | RESPIRATION RATE: 18 BRPM | DIASTOLIC BLOOD PRESSURE: 74 MMHG | SYSTOLIC BLOOD PRESSURE: 117 MMHG | TEMPERATURE: 98.5 F

## 2018-05-22 LAB
ANION GAP SERPL CALC-SCNC: 4 MMOL/L (ref 0–11.9)
BASOPHILS # BLD AUTO: 0.5 % (ref 0–1.8)
BASOPHILS # BLD: 0.04 K/UL (ref 0–0.12)
BUN SERPL-MCNC: 7 MG/DL (ref 8–22)
CALCIUM SERPL-MCNC: 8.9 MG/DL (ref 8.4–10.2)
CHLORIDE SERPL-SCNC: 108 MMOL/L (ref 96–112)
CO2 SERPL-SCNC: 25 MMOL/L (ref 20–33)
CREAT SERPL-MCNC: 0.91 MG/DL (ref 0.5–1.4)
EOSINOPHIL # BLD AUTO: 0.14 K/UL (ref 0–0.51)
EOSINOPHIL NFR BLD: 1.8 % (ref 0–6.9)
ERYTHROCYTE [DISTWIDTH] IN BLOOD BY AUTOMATED COUNT: 40.5 FL (ref 35.9–50)
GLUCOSE SERPL-MCNC: 93 MG/DL (ref 65–99)
HCT VFR BLD AUTO: 42.8 % (ref 42–52)
HGB BLD-MCNC: 14.3 G/DL (ref 14–18)
IMM GRANULOCYTES # BLD AUTO: 0.02 K/UL (ref 0–0.11)
IMM GRANULOCYTES NFR BLD AUTO: 0.3 % (ref 0–0.9)
LYMPHOCYTES # BLD AUTO: 3.27 K/UL (ref 1–4.8)
LYMPHOCYTES NFR BLD: 42.4 % (ref 22–41)
MAGNESIUM SERPL-MCNC: 2 MG/DL (ref 1.5–2.5)
MCH RBC QN AUTO: 28.8 PG (ref 27–33)
MCHC RBC AUTO-ENTMCNC: 33.4 G/DL (ref 33.7–35.3)
MCV RBC AUTO: 86.1 FL (ref 81.4–97.8)
MONOCYTES # BLD AUTO: 0.62 K/UL (ref 0–0.85)
MONOCYTES NFR BLD AUTO: 8 % (ref 0–13.4)
NEUTROPHILS # BLD AUTO: 3.63 K/UL (ref 1.82–7.42)
NEUTROPHILS NFR BLD: 47 % (ref 44–72)
NRBC # BLD AUTO: 0 K/UL
NRBC BLD-RTO: 0 /100 WBC
PLATELET # BLD AUTO: 111 K/UL (ref 164–446)
PMV BLD AUTO: 9.7 FL (ref 9–12.9)
POTASSIUM SERPL-SCNC: 4.1 MMOL/L (ref 3.6–5.5)
RBC # BLD AUTO: 4.97 M/UL (ref 4.7–6.1)
SODIUM SERPL-SCNC: 137 MMOL/L (ref 135–145)
WBC # BLD AUTO: 7.7 K/UL (ref 4.8–10.8)

## 2018-05-22 PROCEDURE — 85025 COMPLETE CBC W/AUTO DIFF WBC: CPT

## 2018-05-22 PROCEDURE — G0378 HOSPITAL OBSERVATION PER HR: HCPCS

## 2018-05-22 PROCEDURE — A9270 NON-COVERED ITEM OR SERVICE: HCPCS | Performed by: INTERNAL MEDICINE

## 2018-05-22 PROCEDURE — 94660 CPAP INITIATION&MGMT: CPT

## 2018-05-22 PROCEDURE — 99217 PR OBSERVATION CARE DISCHARGE: CPT | Performed by: HOSPITALIST

## 2018-05-22 PROCEDURE — 80048 BASIC METABOLIC PNL TOTAL CA: CPT

## 2018-05-22 PROCEDURE — 83735 ASSAY OF MAGNESIUM: CPT

## 2018-05-22 PROCEDURE — 700111 HCHG RX REV CODE 636 W/ 250 OVERRIDE (IP): Performed by: INTERNAL MEDICINE

## 2018-05-22 PROCEDURE — 94760 N-INVAS EAR/PLS OXIMETRY 1: CPT

## 2018-05-22 PROCEDURE — 700102 HCHG RX REV CODE 250 W/ 637 OVERRIDE(OP): Performed by: INTERNAL MEDICINE

## 2018-05-22 RX ORDER — ONDANSETRON 4 MG/1
4 TABLET, FILM COATED ORAL EVERY 4 HOURS PRN
Qty: 20 TAB | Refills: 3 | Status: SHIPPED | OUTPATIENT
Start: 2018-05-22 | End: 2018-05-24

## 2018-05-22 RX ADMIN — OMEPRAZOLE 20 MG: 20 CAPSULE, DELAYED RELEASE ORAL at 08:04

## 2018-05-22 RX ADMIN — Medication 25 MG: at 12:12

## 2018-05-22 RX ADMIN — Medication 25 MG: at 05:38

## 2018-05-22 RX ADMIN — ACETAMINOPHEN 650 MG: 325 TABLET, FILM COATED ORAL at 08:04

## 2018-05-22 RX ADMIN — NICOTINE 14 MG: 14 PATCH, EXTENDED RELEASE TRANSDERMAL at 05:39

## 2018-05-22 RX ADMIN — ONDANSETRON 4 MG: 4 TABLET, ORALLY DISINTEGRATING ORAL at 08:04

## 2018-05-22 RX ADMIN — STANDARDIZED SENNA CONCENTRATE AND DOCUSATE SODIUM 2 TABLET: 8.6; 5 TABLET, FILM COATED ORAL at 08:04

## 2018-05-22 RX ADMIN — SUCRALFATE 1 G: 1 SUSPENSION ORAL at 05:38

## 2018-05-22 RX ADMIN — SUCRALFATE 1 G: 1 SUSPENSION ORAL at 12:12

## 2018-05-22 RX ADMIN — APIXABAN 5 MG: 5 TABLET, FILM COATED ORAL at 08:04

## 2018-05-22 ASSESSMENT — PAIN SCALES - GENERAL: PAINLEVEL_OUTOF10: 2

## 2018-05-22 NOTE — PROGRESS NOTES
Assessment completed, alert and oriented  to person,place and time.No edema, on room air sat 93%. Denied pain at this time.Due medication given per MAR. States understanding of POC. Wife by the bed side.

## 2018-05-22 NOTE — CARE PLAN
Problem: Bowel/Gastric:  Goal: Normal bowel function is maintained or improved  Outcome: PROGRESSING AS EXPECTED  Pt c/o some nausea this AM. No emesis. Tolerating current diet. No abdominal or chest pain noted. Pt given stool softeners to promote bowel motility. Encouraged pt to ambulate, maintain good oral fluid intake. Pt verbalizes understanding of GI POC.    Problem: Pain Management  Goal: Pain level will decrease to patient's comfort goal  Outcome: PROGRESSING AS EXPECTED  Pt states no abdominal or chest pain this AM. C/o HA. Medicated per MAR. Pt educated on available comfort measures. Pt verbalizes understanding.

## 2018-05-22 NOTE — FLOWSHEET NOTE
Patient used his home CPAP last night, is currently off and doing well on room air.     05/22/18 7236   Incentive Spirometry [facility]   Incentive Spirometer Volume 3000 mL   Chest Exam   Respiration 16   Pulse 87   Breath Sounds   RUL Breath Sounds Clear   RML Breath Sounds Clear   RLL Breath Sounds Clear   JEFFERY Breath Sounds Clear   LLL Breath Sounds Clear   Oxygen   Home O2 Use Prior To Admission? No   Pulse Oximetry 94 %   O2 (LPM) 0   O2 Daily Delivery Respiratory  Room Air with O2 Available

## 2018-05-22 NOTE — DISCHARGE INSTRUCTIONS
Discharge Instructions    Discharged to home by car with relative. Discharged via wheelchair, hospital escort: Yes.  Special equipment needed: Not Applicable    Be sure to schedule a follow-up appointment with your primary care doctor or any specialists as instructed.     Discharge Plan:     Activities as tolerated   Follow ups with PCP in 7-10 days, call for appointment   Meds per med rec sheet   No smoking, no alcohol, no caffeine   Wear seat belt in motorized vehicle   Take medications as prescribed   Keep appointments   If symptoms worsen call PCP, 911 or urgent care.    Diet Plan: Discussed  Activity Level: Discussed  Smoking Cessation Offered: Patient Counseled  Confirmed Follow up Appointment: Appointment Scheduled  Confirmed Symptoms Management: Discussed  Medication Reconciliation Updated: Yes  Influenza Vaccine Indication: Not indicated: Previously immunized this influenza season and > 8 years of age    I understand that a diet low in cholesterol, fat, and sodium is recommended for good health. Unless I have been given specific instructions below for another diet, I accept this instruction as my diet prescription.     Other diet: Diet regular with 9-13 servings of fruits and vegetables     Special Instructions: None    · Is patient discharged on Warfarin / Coumadin?   No     Doxylamine tablets  What is this medicine?  DOXYLAMINE (dox IL a meen) is an antihistamine. It is used to treat sleeping problems.  This medicine may be used for other purposes; ask your health care provider or pharmacist if you have questions.  COMMON BRAND NAME(S): Unisom  What should I tell my health care provider before I take this medicine?  They need to know if you have any of these conditions:  -glaucoma  -heart disease  -liver disease  -lung or breathing disease, like asthma or emphysema  -pain or trouble passing urine  -prostate trouble  -ulcers or other stomach problems  -an unusual or allergic reaction to doxylamine, other  medicines, foods, dyes, or preservatives  -pregnant or trying to get pregnant  -breast-feeding  How should I use this medicine?  Take this medicine by mouth with a glass of water. Follow the directions on the package or prescription label. Take this medicine 30 minutes before you go to bed. Do not take it more often than directed.  Talk to your pediatrician regarding the use of this medicine in children. While this drug may be prescribed for children as young as 12 years old for selected conditions, precautions do apply.  Patients over 65 years old may have a stronger reaction and need a smaller dose.  Overdosage: If you think you have taken too much of this medicine contact a poison control center or emergency room at once.  NOTE: This medicine is only for you. Do not share this medicine with others.  What if I miss a dose?  If you miss a dose, take it as soon as you can. If it is almost time for your next dose, take only that dose. Do not take double or extra doses.  What may interact with this medicine?  -alcohol  -MAOIs like Carbex, Eldepryl, Marplan, Nardil, and Parnate  -some medicines for allergies, cold, or cough  -medicines that make you sleepy  This list may not describe all possible interactions. Give your health care provider a list of all the medicines, herbs, non-prescription drugs, or dietary supplements you use. Also tell them if you smoke, drink alcohol, or use illegal drugs. Some items may interact with your medicine.  What should I watch for while using this medicine?  See your doctor or healthcare professional if you need to use this sleep aid for more than 2 weeks.  Your mouth may get dry. Chewing sugarless gum or sucking hard candy, and drinking plenty of water may help. Contact your doctor if the problem does not go away or is severe.  This medicine may cause dry eyes and blurred vision. If you wear contact lenses you may feel some discomfort. Lubricating drops may help. See your eye doctor if  the problem does not go away or is severe.  You may get drowsy or dizzy. Do not drive, use machinery, or do anything that needs mental alertness until you know how this medicine affects you. Do not stand or sit up quickly, especially if you are an older patient. This reduces the risk of dizzy or fainting spells. Alcohol may interfere with the effect of this medicine. Avoid alcoholic drinks.  What side effects may I notice from receiving this medicine?  Side effects that you should report to your doctor or health care professional as soon as possible:  -allergic reactions like skin rash, itching or hives, swelling of the face, lips, or tongue  -changes in vision  -confused, agitated, or nervous  -fast, irregular heartbeat  -feeling faint or lightheaded, falls  -muscle or facial twitches  -seizure  -trouble passing urine or change in the amount of urine  -unusual bleeding or bruising  Side effects that usually do not require medical attention (report to your doctor or health care professional if they continue or are bothersome):  -dry mouth  -headache  -loss of appetite  -stomach upset, vomiting  This list may not describe all possible side effects. Call your doctor for medical advice about side effects. You may report side effects to FDA at 7-027-FDA-9907.  Where should I keep my medicine?  Keep out of the reach of children.  Store at room temperature between 15 and 30 degrees C (59 and 86 degrees F). Do not freeze. Protect from light. Throw away any unused medicine after the expiration date.  NOTE: This sheet is a summary. It may not cover all possible information. If you have questions about this medicine, talk to your doctor, pharmacist, or health care provider.  © 2018 Elsevier/Gold Standard (2009-04-09 17:57:55)    Ondansetron oral dissolving tablet  What is this medicine?  ONDANSETRON (on DAN se tere) is used to treat nausea and vomiting caused by chemotherapy. It is also used to prevent or treat nausea and  vomiting after surgery.  This medicine may be used for other purposes; ask your health care provider or pharmacist if you have questions.  COMMON BRAND NAME(S): Zofran ODT  What should I tell my health care provider before I take this medicine?  They need to know if you have any of these conditions:  -heart disease  -history of irregular heartbeat  -liver disease  -low levels of magnesium or potassium in the blood  -an unusual or allergic reaction to ondansetron, granisetron, other medicines, foods, dyes, or preservatives  -pregnant or trying to get pregnant  -breast-feeding  How should I use this medicine?  These tablets are made to dissolve in the mouth. Do not try to push the tablet through the foil backing. With dry hands, peel away the foil backing and gently remove the tablet. Place the tablet in the mouth and allow it to dissolve, then swallow. While you may take these tablets with water, it is not necessary to do so.  Talk to your pediatrician regarding the use of this medicine in children. Special care may be needed.  Overdosage: If you think you have taken too much of this medicine contact a poison control center or emergency room at once.  NOTE: This medicine is only for you. Do not share this medicine with others.  What if I miss a dose?  If you miss a dose, take it as soon as you can. If it is almost time for your next dose, take only that dose. Do not take double or extra doses.  What may interact with this medicine?  Do not take this medicine with any of the following medications:  -apomorphine  -certain medicines for fungal infections like fluconazole, itraconazole, ketoconazole, posaconazole, voriconazole  -cisapride  -dofetilide  -dronedarone  -pimozide  -thioridazine  -ziprasidone  This medicine may also interact with the following medications:  -carbamazepine  -certain medicines for depression, anxiety, or psychotic disturbances  -fentanyl  -linezolid  -MAOIs like Carbex, Eldepryl, Marplan,  Nardil, and Parnate  -methylene blue (injected into a vein)  -other medicines that prolong the QT interval (cause an abnormal heart rhythm)  -phenytoin  -rifampicin  -tramadol  This list may not describe all possible interactions. Give your health care provider a list of all the medicines, herbs, non-prescription drugs, or dietary supplements you use. Also tell them if you smoke, drink alcohol, or use illegal drugs. Some items may interact with your medicine.  What should I watch for while using this medicine?  Check with your doctor or health care professional as soon as you can if you have any sign of an allergic reaction.  What side effects may I notice from receiving this medicine?  Side effects that you should report to your doctor or health care professional as soon as possible:  -allergic reactions like skin rash, itching or hives, swelling of the face, lips, or tongue  -breathing problems  -confusion  -dizziness  -fast or irregular heartbeat  -feeling faint or lightheaded, falls  -fever and chills  -loss of balance or coordination  -seizures  -sweating  -swelling of the hands and feet  -tightness in the chest  -tremors  -unusually weak or tired  Side effects that usually do not require medical attention (report to your doctor or health care professional if they continue or are bothersome):  -constipation or diarrhea  -headache  This list may not describe all possible side effects. Call your doctor for medical advice about side effects. You may report side effects to FDA at 2-116-FDA-9155.  Where should I keep my medicine?  Keep out of the reach of children.  Store between 2 and 30 degrees C (36 and 86 degrees F). Throw away any unused medicine after the expiration date.  NOTE: This sheet is a summary. It may not cover all possible information. If you have questions about this medicine, talk to your doctor, pharmacist, or health care provider.  © 2018 Elsevier/Gold Standard (2014-09-24 16:21:52)      Nausea  and Vomiting, Adult  Nausea is the feeling that you have an upset stomach or have to vomit. As nausea gets worse, it can lead to vomiting. Vomiting occurs when stomach contents are thrown up and out of the mouth. Vomiting can make you feel weak and cause you to become dehydrated. Dehydration can make you tired and thirsty, cause you to have a dry mouth, and decrease how often you urinate. Older adults and people with other diseases or a weak immune system are at higher risk for dehydration. It is important to treat your nausea and vomiting as told by your health care provider.  Follow these instructions at home:  Follow instructions from your health care provider about how to care for yourself at home.  Eating and drinking  Follow these recommendations as told by your health care provider:  · Take an oral rehydration solution (ORS). This is a drink that is sold at pharmacies and retail stores.  · Drink clear fluids in small amounts as you are able. Clear fluids include water, ice chips, diluted fruit juice, and low-calorie sports drinks.  · Eat bland, easy-to-digest foods in small amounts as you are able. These foods include bananas, applesauce, rice, lean meats, toast, and crackers.  · Avoid fluids that contain a lot of sugar or caffeine, such as energy drinks, sports drinks, and soda.  · Avoid alcohol.  · Avoid spicy or fatty foods.  General instructions  · Drink enough fluid to keep your urine clear or pale yellow.  · Wash your hands often. If soap and water are not available, use hand .  · Make sure that all people in your household wash their hands well and often.  · Take over-the-counter and prescription medicines only as told by your health care provider.  · Rest at home while you recover.  · Watch your condition for any changes.  · Breathe slowly and deeply when you feel nauseated.  · Keep all follow-up visits as told by your health care provider. This is important.  Contact a health care provider  if:  · You have a fever.  · You cannot keep fluids down.  · Your symptoms get worse.  · You have new symptoms.  · Your nausea does not go away after two days.  · You feel light-headed or dizzy.  · You have a headache.  · You have muscle cramps.  Get help right away if:  · You have pain in your chest, neck, arm, or jaw.  · You feel extremely weak or you faint.  · You have persistent vomiting.  · You see blood in your vomit.  · Your vomit looks like black coffee grounds.  · You have bloody or black stools or stools that look like tar.  · You have a severe headache, a stiff neck, or both.  · You have a rash.  · You have severe pain, cramping, or bloating in your abdomen.  · You have trouble breathing or you are breathing very quickly.  · Your heart is beating very quickly.  · Your skin feels cold and clammy.  · You feel confused.  · You have pain when you urinate.  · You have signs of dehydration, such as:  ¨ Dark urine, very little urine, or no urine.  ¨ Cracked lips.  ¨ Dry mouth.  ¨ Sunken eyes.  ¨ Sleepiness.  ¨ Weakness.  These symptoms may represent a serious problem that is an emergency. Do not wait to see if the symptoms will go away. Get medical help right away. Call your local emergency services (911 in the [place] Do not drive yourself to the hospital.   This information is not intended to replace advice given to you by your health care provider. Make sure you discuss any questions you have with your health care provider.  Document Released: 12/18/2006 Document Revised: 05/22/2017 Document Reviewed: 08/23/2016  [employer] Interactive Patient Education © 2017 [employer]    Depression / Suicide Risk    As you are discharged from this [facility] facility, it is important to learn how to keep safe from harming yourself.    Recognize the warning signs:  · Abrupt changes in personality, positive or negative- including increase in energy   · Giving away possessions  · Change in eating patterns- significant  weight changes-  positive or negative  · Change in sleeping patterns- unable to sleep or sleeping all the time   · Unwillingness or inability to communicate  · Depression  · Unusual sadness, discouragement and loneliness  · Talk of wanting to die  · Neglect of personal appearance   · Rebelliousness- reckless behavior  · Withdrawal from people/activities they love  · Confusion- inability to concentrate     If you or a loved one observes any of these behaviors or has concerns about self-harm, here's what you can do:  · Talk about it- your feelings and reasons for harming yourself  · Remove any means that you might use to hurt yourself (examples: pills, rope, extension cords, firearm)  · Get professional help from the community (Mental Health, Substance Abuse, psychological counseling)  · Do not be alone:Call your Safe Contact- someone whom you trust who will be there for you.  · Call your local CRISIS HOTLINE 518-1296 or 489-148-1770  · Call your local Children's Mobile Crisis Response Team [place] (803) 027-0324 or www.[employer]  · Call the toll free National Suicide Prevention Hotlines   · National Suicide Prevention Lifeline 739-973-RIHI (1158)  · National Hope Line Network 800-SUICIDE (292-0368)

## 2018-05-22 NOTE — PROGRESS NOTES
Assessment completed. Due meds given. Pt AAOx4, fatigued. C/o HA 2/10, medicated per MAR. C/o nausea, zofran given. No other needs/complaints. Lab at bedside to redraw CBC. Call light in reach. Will monitor.

## 2018-05-22 NOTE — PROGRESS NOTES
Bedside report received from Rhonda NEGRO. Assumed care. POC discussed. Pt resting comfortably in bed. Safety precautions in place.

## 2018-05-22 NOTE — DISCHARGE SUMMARY
"CHIEF COMPLAINT ON ADMISSION  Chief Complaint   Patient presents with   • Chest Pain     started one hour PTA   • N/V     \"all morning\"   • Anxiety     hyperventilating, restless       CODE STATUS  Full Code    HPI & HOSPITAL COURSE  This is a 43 y.o. male here with intractable nausea vomiting. The patient has a history of cannabinoid hyperemesis syndrome. Patient also has Randle's esophagus. The patient this time started to have severe nausea vomiting after he had some cannabinoid products. The patient says he does not believe that those cause any problems for him. In spite of this when he was taken off the cannabinoids given fluids and antiemetics patient stabilized. Electrolytes were also stabilized. The patient overall this point is returned back to his baseline he is no longer in any pain. We have at this point counseled him on cessation with cannabinoids but he does not believe that that has anything to do with his problems. Overall patient's condition this woman has stabilizing can be discharged home and outpatient follow-ups and management.      Therefore, he is discharged in good and stable condition with close outpatient follow-up.    SPECIFIC OUTPATIENT FOLLOW-UP  Follow-up with the primary care physician in 7-10 days    DISCHARGE PROBLEM LIST  Active Problems:    Marijuana intoxication, with unspecified complication (HCC) POA: Yes    Gastroesophageal reflux disease with esophagitis POA: Yes    Other chest pain POA: Yes    Leukocytosis POA: Yes    History of pulmonary embolism POA: Yes      Overview: Lower, left lung and Eliquis to be continue until 7/30/18    Dyslipidemia POA: Yes    Randle esophagus POA: Yes    Tobacco abuse POA: Yes    Sinus tachycardia POA: Yes    Thrombocytopenia (HCC) POA: Yes    Hyperglycemia POA: Yes    Cough POA: Unknown  Resolved Problems:    * No resolved hospital problems. *      FOLLOW UP  Future Appointments  Date Time Provider Department Center   5/23/2018 10:20 AM CARE " MANAGER [facility]   5/24/2018 9:20 AM MEHRDAD Rico. [facility]   6/20/2018 10:40 AM Gurpreet Son M.D. [facility] None     Santana Perea M.D.  [address] located within [facility] [address]  [phone]      Please call today to schedule an appointment for hospital follow-up.      MEDICATIONS ON DISCHARGE   Jerome Cardoza Jr.   Home Medication Instructions LEXIE:[id number]    Printed on:05/22/18 1340   Medication Information                      amitriptyline (ELAVIL) 100 MG Tab  Take 100 mg by mouth every evening.             apixaban (ELIQUIS) 5mg Tab  Take 1 Tab by mouth 2 Times a Day.             doxylamine (UNISOM) 25 MG Tab tablet  Take 1 Tab by mouth every 6 hours for 30 days.             ondansetron (ZOFRAN ODT) 4 MG TABLET DISPERSIBLE  Take 4 mg by mouth every 8 hours as needed for Nausea.             ondansetron (ZOFRAN) 4 MG Tab tablet  Take 1 Tab by mouth every four hours as needed for Nausea/Vomiting for up to 14 days.             pantoprazole (PROTONIX) 40 MG Tablet Delayed Response  Take 40 mg by mouth every evening.             promethazine (PHENERGAN) 25 MG Tab  Take 25 mg by mouth every 6 hours as needed for Nausea/Vomiting.             ranitidine (ZANTAC) 150 MG Tab  Take 1 Tab by mouth 2 times a day.             simvastatin (ZOCOR) 10 MG Tab  Take 10 mg by mouth every evening.                 DIET  Orders Placed This Encounter   Procedures   • Diet Order     Standing Status:   Standing     Number of Occurrences:   1     Order Specific Question:   Diet:     Answer:   Clear Liquids - No Red Foods [12]       ACTIVITY  As tolerated.  Weight bearing as tolerated      CONSULTATIONS  None    PROCEDURES  None    LABORATORY  Lab Results   Component Value Date/Time    SODIUM 137 05/22/2018 05:12 AM    POTASSIUM 4.1 05/22/2018 05:12 AM    CHLORIDE 108 05/22/2018 05:12 AM    CO2 25 05/22/2018 05:12 AM    GLUCOSE 93 05/22/2018 05:12 AM    BUN 7 (L) 05/22/2018 05:12 AM    CREATININE 0.91 05/22/2018 05:12 AM         Lab Results   Component Value Date/Time    WBC 7.7 05/22/2018 08:03 AM    HEMOGLOBIN 14.3 05/22/2018 08:03 AM    HEMATOCRIT 42.8 05/22/2018 08:03 AM    PLATELETCT 111 (L) 05/22/2018 08:03 AM        Total time of the discharge process exceeds 38 minutes

## 2018-05-22 NOTE — PROGRESS NOTES
Due meds given. Pt states no nausea or pain at this time. Spouse at bedside. Pt informed RN of circular rash on left arm, small blister also noted. RN checked under pt's current nicotine patch on right arm, noted redness/irritation. Removed patch, pt OK with this. Added nicoderm to pt allergy list. Will inform MD of possible allergic reaction to patch. Pt declines other needs at this time. Will CTM.

## 2018-05-22 NOTE — PROGRESS NOTES
Bedside report given to  Ivis NEGRO.Pt  Resting in the bed.Safety precautions in place and all the lines remain patent.

## 2018-05-22 NOTE — PROGRESS NOTES
Pt d/c'd with all belongings. IV removed. D/c instructions including Rx and appts reviewed with pt and spouse. Pt walked out with spouse, steady gait.

## 2018-05-22 NOTE — PROGRESS NOTES
Bedside report given to Rhonda NEGRO. POC discussed. Pt resting comfortably in bed. Safety precautions in place.

## 2018-05-23 ENCOUNTER — PATIENT OUTREACH (OUTPATIENT)
Dept: HEALTH INFORMATION MANAGEMENT | Facility: OTHER | Age: 43
End: 2018-05-23

## 2018-05-23 NOTE — PROGRESS NOTES
5/23/18 10:45 am:  CM post discharge outreach call first attempt.  VM left requesting return call to  at [phone].    5/23/18 2:50 pm:  CM post discharge outreach call second attempt.  VM left requesting return call to  at [phone].  
none

## 2018-05-24 ENCOUNTER — OFFICE VISIT (OUTPATIENT)
Dept: MEDICAL GROUP | Facility: CLINIC | Age: 43
End: 2018-05-24
Payer: COMMERCIAL

## 2018-05-24 ENCOUNTER — TELEPHONE (OUTPATIENT)
Dept: MEDICAL GROUP | Facility: CLINIC | Age: 43
End: 2018-05-24

## 2018-05-24 VITALS
OXYGEN SATURATION: 95 % | HEIGHT: 67 IN | TEMPERATURE: 98.1 F | SYSTOLIC BLOOD PRESSURE: 120 MMHG | HEART RATE: 108 BPM | DIASTOLIC BLOOD PRESSURE: 68 MMHG | WEIGHT: 199 LBS | RESPIRATION RATE: 16 BRPM | BODY MASS INDEX: 31.23 KG/M2

## 2018-05-24 DIAGNOSIS — R05.9 COUGH: ICD-10-CM

## 2018-05-24 DIAGNOSIS — F12.929 MARIJUANA INTOXICATION, WITH UNSPECIFIED COMPLICATION (HCC): ICD-10-CM

## 2018-05-24 DIAGNOSIS — R11.2 NON-INTRACTABLE VOMITING WITH NAUSEA, UNSPECIFIED VOMITING TYPE: ICD-10-CM

## 2018-05-24 DIAGNOSIS — R00.0 SINUS TACHYCARDIA: ICD-10-CM

## 2018-05-24 DIAGNOSIS — Z09 HOSPITAL DISCHARGE FOLLOW-UP: ICD-10-CM

## 2018-05-24 DIAGNOSIS — J20.9 ACUTE BRONCHITIS, UNSPECIFIED ORGANISM: ICD-10-CM

## 2018-05-24 DIAGNOSIS — Z72.0 TOBACCO ABUSE: ICD-10-CM

## 2018-05-24 PROCEDURE — 99214 OFFICE O/P EST MOD 30 MIN: CPT | Performed by: NURSE PRACTITIONER

## 2018-05-24 RX ORDER — AMOXICILLIN AND CLAVULANATE POTASSIUM 875; 125 MG/1; MG/1
1 TABLET, FILM COATED ORAL 2 TIMES DAILY
Qty: 10 TAB | Refills: 0 | Status: SHIPPED | OUTPATIENT
Start: 2018-05-24 | End: 2018-05-29

## 2018-05-24 RX ORDER — PROMETHAZINE HYDROCHLORIDE 25 MG/1
25 TABLET ORAL EVERY 6 HOURS PRN
Qty: 30 TAB | Refills: 0 | Status: SHIPPED | OUTPATIENT
Start: 2018-05-24 | End: 2018-06-28 | Stop reason: SDUPTHER

## 2018-05-24 RX ORDER — METOCLOPRAMIDE 5 MG/1
5 TABLET ORAL EVERY 8 HOURS PRN
Qty: 20 TAB | Refills: 0 | Status: CANCELLED | OUTPATIENT
Start: 2018-05-24

## 2018-05-24 ASSESSMENT — ENCOUNTER SYMPTOMS
FOCAL WEAKNESS: 0
NERVOUS/ANXIOUS: 0
SPUTUM PRODUCTION: 1
HEADACHES: 0
WEAKNESS: 0
VOMITING: 0
SHORTNESS OF BREATH: 1
ABDOMINAL PAIN: 0
NAUSEA: 1
DEPRESSION: 0
FEVER: 0
CHILLS: 0
FALLS: 0
PALPITATIONS: 0
HEARTBURN: 0
COUGH: 1
WHEEZING: 0
MYALGIAS: 0
DIZZINESS: 0

## 2018-05-24 NOTE — PATIENT INSTRUCTIONS
If you need further assistance, or have any questions; concerns or lingering symptoms before seeing your Primary Care Provider or specialist.     Do not hesitate to contact us.     Please contact us at the Post-Hospital Follow Up Program at [phone].   Our offices hours are Monday-Friday 8 am-5 pm.

## 2018-05-24 NOTE — TELEPHONE ENCOUNTER
----- Message from CAROLYN Rico sent at 5/24/2018 10:51 AM PDT -----  Regarding: TSH lab  Please call patient to let him know that I ordered one thyroid function test for him to do 1-2 days prior to his PCP visit. Would like to know if his tachycardia is related to his thyroid function. His thyroid function is still in the normal range now but it is a little bit at higher side. Will repeat to see if there is any change. Please mail order to him or let me know no paper order needed if he goes to [facility] lab.     Thanks.   Macy

## 2018-05-24 NOTE — PROGRESS NOTES
Subjective:     Jerome Cardoza Jr. is a 43 y.o. male who presents for Hospital Follow-up.  Chart reviewed. Discharge summary available for review: Yes   Date of discharge 5/22/2018.  48- hour post discharge RN call completed on 5/23/2018 with two unssuccesful attempts and documented in the medical record by Maryam Miller.    HPI: Recently hospitalized for chest pain, nausea/vomiting after using cannabinoid, anxiety, hx of cannabinoid hyperemesis syndrome, Randle's esophagus. Unremarkable CT of abdomen and pelvic. No CT evidence of pulmonary embolism (Hx of PE on eliquis, will be discontinued on 7/30) but noted possible esophagitis and likely lung atelectasis.    Resolved leukocytosis, kidney function at baseline.  A1C 5.8,  (March, on simvastatin), - trop x 3, BNP 42, TSH 0.5  EKG ST, QTc 480    Since returning home, patient reports feeling better but still having productive coughing with intermittent sob when severe coughing. He appears tachycardia still with no associated symptoms. He denied any chest discomfort, palpitation or dizziness. He reports he thinks he did chock when he was having vomiting prior to hospitalization. He reports nausea is getting better, no more vomiting and he does not have to take zofran all the time. Given QTc prolong, zofran is not recommended and he agrees. He has phenergan before and it worked for him too but he would need refill.     The patient denied increased weakness; no difficulty taking care of self at home.  Patient reports taking medications as instructed.    No PCP follow up appointment.    Patient Active Problem List    Diagnosis Date Noted   • Marijuana intoxication, with unspecified complication (HCC) 05/26/2013     Priority: High   • Other chest pain 05/20/2018     Priority: Medium   • Gastroesophageal reflux disease with esophagitis 01/30/2018     Priority: Medium   • Thrombocytopenia (HCC) 05/20/2018     Priority: Low   • Hyperglycemia 05/20/2018      "Priority: Low   • Sinus tachycardia 04/09/2018     Priority: Low   • Tobacco abuse 03/23/2018     Priority: Low   • History of pulmonary embolism 01/30/2018     Priority: Low   • Dyslipidemia 01/30/2018     Priority: Low   • Randle esophagus 01/30/2018     Priority: Low   • Cough 05/21/2018   • Acute bronchitis 04/09/2018   • History of esophageal stricture 02/22/2018   • Family history of systemic lupus erythematosus (SLE) in mother 02/22/2018   • Obstructive sleep apnea syndrome 01/30/2018   • Family history of MI (myocardial infarction) 01/30/2018   • Non-intractable vomiting with nausea 11/28/2013         Allergies:   Fish; Green beans; Nicoderm [nicotine]; and Turkey    Social History:  Social History   Substance Use Topics   • Smoking status: Current Every Day Smoker     Packs/day: 0.50     Years: 22.00     Types: Cigarettes   • Smokeless tobacco: Never Used   • Alcohol use No        ROS:  Review of Systems   Constitutional: Negative for chills, fever and malaise/fatigue.   Respiratory: Positive for cough (worsening with phlegm), sputum production and shortness of breath (sometiems when severe coughing). Negative for wheezing.    Cardiovascular: Negative for chest pain, palpitations and leg swelling.   Gastrointestinal: Positive for nausea (a little bit but not bad, once per day or less). Negative for abdominal pain, heartburn and vomiting.   Genitourinary: Negative for dysuria, frequency and urgency.   Musculoskeletal: Negative for falls and myalgias.   Skin: Negative for rash.   Neurological: Negative for dizziness, focal weakness, weakness and headaches.   Psychiatric/Behavioral: Negative for depression. The patient is not nervous/anxious.         Objective:     Blood pressure 120/68, pulse (!) 108, temperature 36.7 °C (98.1 °F), resp. rate 16, height 1.702 m (5' 7\"), weight 90.3 kg (199 lb), SpO2 95 %.     Physical Exam:  Physical Exam   Constitutional: He is oriented to person, place, and time and " well-developed, well-nourished, and in no distress.   HENT:   Head: Normocephalic and atraumatic.   Eyes: Conjunctivae are normal.   Neck: Neck supple. No JVD present.   Cardiovascular: Normal rate and regular rhythm.    No murmur heard.  Pulmonary/Chest: Effort normal. No respiratory distress. He has wheezes. He has no rales.   Somewhat diminished at base, fine wheezing from mid to lower lung bilaterally   Abdominal: Soft. Bowel sounds are normal. He exhibits no distension. There is no tenderness. There is no rebound.   Musculoskeletal: Normal range of motion. He exhibits no edema.   Neurological: He is alert and oriented to person, place, and time.   Skin: Skin is warm.   Vitals reviewed.        Assessment and Plan:     1. Hospital discharge follow-up  Hospitalization and results reviewed with patient. High risk conditions requiring teaching or care coordination were identified and addressed.The patient demonstrate understanding of admission and underlying conditions. The patient understands discharge instructions and when to seek medical attention. Medications reviewed including instructions regarding high risk medications, dosing and side effects.    The patient is able to safely adhere to ADL/IADL, treatment and medication regimen, self-manage of high-risk conditions? Yes   The patient requires physical therapy/home health/DME referral? No   The patient requires referral to care coordination/behavioral health/social work?  No   Patient requires referral for pharmacy consult? No   Advance directive/POLST on file?  No   Required counseled on advance directive?  No     Scheduled to see PCP on 6/21    2. Marijuana intoxication, with unspecified complication (HCC)  Not use after discharge    3. Non-intractable vomiting with nausea, unspecified vomiting type  Discontinued zofran due to borderline QTc prolong.   - promethazine (PHENERGAN) 25 MG Tab; Take 1 Tab by mouth every 6 hours as needed for Nausea/Vomiting.   Dispense: 30 Tab; Refill: 0  - amoxicillin-clavulanate (AUGMENTIN) 875-125 MG Tab; Take 1 Tab by mouth 2 times a day for 5 days.  Dispense: 10 Tab; Refill: 0 to cover possible aspiration pneumonia (worsening coughing, wheezing, tachycardia still, previous baseline HR running 80-90 per pt). Pt is instructed to call us if continue to be tachycardia especially with symptoms like palpitation. He denied any associated symptoms at this time. TSH 0.5, will have pt repeat 1-2 days prior to PCP visit.     4. Tobacco abuse  - pt is working on cessation.   - amoxicillin-clavulanate (AUGMENTIN) 875-125 MG Tab; Take 1 Tab by mouth 2 times a day for 5 days.  Dispense: 10 Tab; Refill: 0  - pt is to consider PFT. He is seeing sleep medicine for sleep apnea.     5. Acute bronchitis, unspecified organism  - amoxicillin-clavulanate (AUGMENTIN) 875-125 MG Tab; Take 1 Tab by mouth 2 times a day for 5 days.  Dispense: 10 Tab; Refill: 0 --> to cover possible aspiration organism.    6. Cough  - amoxicillin-clavulanate (AUGMENTIN) 875-125 MG Tab; Take 1 Tab by mouth 2 times a day for 5 days.  Dispense: 10 Tab; Refill: 0    7. Sinus tachycardia  - TSH+FREE T4        Medication Reconciliation  Medication list at end of encounter:   Current Outpatient Prescriptions   Medication Sig Dispense Refill   • promethazine (PHENERGAN) 25 MG Tab Take 1 Tab by mouth every 6 hours as needed for Nausea/Vomiting. 30 Tab 0   • amoxicillin-clavulanate (AUGMENTIN) 875-125 MG Tab Take 1 Tab by mouth 2 times a day for 5 days. 10 Tab 0   • doxylamine (UNISOM) 25 MG Tab tablet Take 1 Tab by mouth every 6 hours for 30 days. 120 Tab 0   • apixaban (ELIQUIS) 5mg Tab Take 1 Tab by mouth 2 Times a Day. 60 Tab 0   • pantoprazole (PROTONIX) 40 MG Tablet Delayed Response Take 40 mg by mouth every evening.     • amitriptyline (ELAVIL) 100 MG Tab Take 100 mg by mouth every evening.     • simvastatin (ZOCOR) 10 MG Tab Take 10 mg by mouth every evening.     • ranitidine  (ZANTAC) 150 MG Tab Take 1 Tab by mouth 2 times a day. 60 Tab 3     No current facility-administered medications for this visit.        Primary care follow-up:  New health conditions identified during hospitalization? Yes   Labs/pathology/imaging requires future PCP follow-up?  Yes TSH  Changes to medications during hospitalization or today? Yes     Recommended followup: Return in about 4 weeks (around 6/21/2018), or if symptoms worsen or fail to improve, for Follow up with PCP. with Andrea Machado P.A.-C.   Future Appointments       Provider Department [place]    5/24/2018 9:20 AM CAROLYN Rico [facility]    6/20/2018 10:40 AM Gurpreet Son M.D. [facility] Sleep Medicine           Patient Instruction  Patient offered educational material on discharge diagnosis and management of symptoms/red flags. Patient instructed to keep follow-up appointments and to bring written questions and and actual medications to each office visit. Patient instructed to call PCP/specialist with any problems/questions/concerns. Patient verbalizes understanding and has no further questions at this time.    Face-to-face transitional care management services with moderate complexity medical decision making.

## 2018-05-31 ENCOUNTER — SLEEP CENTER VISIT (OUTPATIENT)
Dept: SLEEP MEDICINE | Facility: MEDICAL CENTER | Age: 43
End: 2018-05-31
Payer: COMMERCIAL

## 2018-05-31 VITALS
HEART RATE: 104 BPM | RESPIRATION RATE: 16 BRPM | OXYGEN SATURATION: 94 % | DIASTOLIC BLOOD PRESSURE: 82 MMHG | HEIGHT: 67 IN | WEIGHT: 204 LBS | SYSTOLIC BLOOD PRESSURE: 164 MMHG | BODY MASS INDEX: 32.02 KG/M2

## 2018-05-31 DIAGNOSIS — Z72.0 TOBACCO ABUSE: ICD-10-CM

## 2018-05-31 DIAGNOSIS — G47.33 OBSTRUCTIVE SLEEP APNEA SYNDROME: ICD-10-CM

## 2018-05-31 PROCEDURE — 99213 OFFICE O/P EST LOW 20 MIN: CPT | Performed by: INTERNAL MEDICINE

## 2018-05-31 NOTE — PROGRESS NOTES
CC: Follow up for JEN    HPI:  Mr. Jerome Cardoza is a 44 y/o male who was last seen 3/23/2018.   His 30 day compliance shows usage for 87% of the days for an average of 6:13 with a residual ahi of 1.6. He is achieving great benefit from its use with more energy and less headaches. No undue issues with the mask fit, leak, or pressure tolerance. Has the humidity set on 6 which seems right for him. Median leak is 6.5 lpm.    His note from that day:    Mr. Jerome Mcmillan is a 43-year-old man who is kindly referred by Andrea Machado PA-C for evaluation of obstructive sleep apnea syndrome. The patient tells me that he had a home sleep study performed by [facility] which demonstrated sleep apnea. Had had an abnormal cnox first.  However his insurance changed before the treatment was ordered. He is here today for further evaluation and management of his sleep apnea and possibly for DME CPAP. Hopefully, this study he had in November will still qualify him for a trial of auto titrating CPAP. If not, our options are to repeat a home sleep test versus performing and attended split night polysomnogram. He works in electrical construction.     His symptoms include waking up out of breath, morning headaches, fatigue, 2-3 nocturnal awakenings, waking up too early in the morning and being unable to return to sleep, sleepiness during the day, too little sleep at night, tiredness during the day, witnessed apneas, inconsistent breathing, dreams of falling or drowning, loud and disturbing snoring, resuscitative snorts, disturbing  dreams, and nocturnal headaches. He may fall asleep watching TV, at a theater, riding in a bus or car.     His sleep diary reveals no for 7 days but he awakened with a headache 6 of 7 days. He generally goes to bed between 10 PM and midnight and gets up between 5 AM and 8 AM.     His partner describes loud snoring, twitching of legs or feet during sleep, pauses in  "breathing, sleep talking, witnessed apneas, resuscitative snorts, and tossing and turning throughout the night. These behaviors occur every night not been present for \"a couple of years\".     Dad used cpap, though he has now . Mom and sisters use cpap.        His APNEALINK was performed on 2017. The device use was an APNEALINK air type 3 portable device. The recording duration was 9 hours and 38 minutes. His respiratory event index was 11.7. He had 222 oxygen desaturations and his MARGY was 23.6. He spent 4 hours and 58 minutes with saturations less than or equal to 88%.       Patient Active Problem List    Diagnosis Date Noted   • Marijuana intoxication, with unspecified complication ([facility]) 2013     Priority: High   • Other chest pain 2018     Priority: Medium   • Gastroesophageal reflux disease with esophagitis 2018     Priority: Medium   • Thrombocytopenia ([facility]) 2018     Priority: Low   • Hyperglycemia 2018     Priority: Low   • Sinus tachycardia 2018     Priority: Low   • Tobacco abuse 2018     Priority: Low   • History of pulmonary embolism 2018     Priority: Low   • Dyslipidemia 2018     Priority: Low   • Randle esophagus 2018     Priority: Low   • Cough 2018   • Acute bronchitis 2018   • History of esophageal stricture 2018   • Family history of systemic lupus erythematosus (SLE) in mother 2018   • Obstructive sleep apnea syndrome 2018   • Family history of MI (myocardial infarction) 2018   • Non-intractable vomiting with nausea 2013       Past Medical History:   Diagnosis Date   • Bronchitis    • Chickenpox    • GERD (gastroesophageal reflux disease)    • IBS (irritable bowel syndrome)    • Indigestion    • Influenza    • Pneumonia    • Pulmonary embolism ([facility])    • Renal disorder     Acute Renal Failure from dehydration in the past   • Sleep apnea         Past Surgical History:   Procedure " "Laterality Date   • KNEE REPLACEMENT, TOTAL Left 10/2017   • LENORA BY LAPAROSCOPY      Cholecystectomy, Laparoscopic   • OTHER ORTHOPEDIC SURGERY     • TONSILLECTOMY     • TONSILLECTOMY         Family History   Problem Relation Age of Onset   • Other Mother      Lupus   • Sleep Apnea Mother    • Heart Disease Father    • Hypertension Father    • Sleep Apnea Father    • Heart Disease Paternal Uncle    • Heart Disease Paternal Grandfather        Social History     Social History   • Marital status:      Spouse name: N/A   • Number of children: N/A   • Years of education: N/A     Occupational History   • Not on file.     Social History Main Topics   • Smoking status: Current Every Day Smoker     Packs/day: 0.50     Years: 22.00     Types: Cigarettes   • Smokeless tobacco: Never Used   • Alcohol use No   • Drug use: Yes     Types: Inhaled      Comment: marijuana occ   • Sexual activity: Yes     Partners: Female     Other Topics Concern   • Not on file     Social History Narrative   • No narrative on file       Current Outpatient Prescriptions   Medication Sig Dispense Refill   • promethazine (PHENERGAN) 25 MG Tab Take 1 Tab by mouth every 6 hours as needed for Nausea/Vomiting. 30 Tab 0   • doxylamine (UNISOM) 25 MG Tab tablet Take 1 Tab by mouth every 6 hours for 30 days. 120 Tab 0   • apixaban (ELIQUIS) 5mg Tab Take 1 Tab by mouth 2 Times a Day. 60 Tab 0   • pantoprazole (PROTONIX) 40 MG Tablet Delayed Response Take 40 mg by mouth every evening.     • amitriptyline (ELAVIL) 100 MG Tab Take 100 mg by mouth every evening.     • simvastatin (ZOCOR) 10 MG Tab Take 10 mg by mouth every evening.     • ranitidine (ZANTAC) 150 MG Tab Take 1 Tab by mouth 2 times a day. 60 Tab 3     No current facility-administered medications for this visit.     \"CURRENT RX\"    ALLERGIES: Fish; Barley grass; Green beans; Nicoderm [nicotine]; and Turkey    ROS    Constitutional: Denies fever, chills, sweats,  weight loss, fatigue.  Eyes: " "Denies vision loss, pain, drainage, double vision, glasses.  Ears/Nose/Mouth/Throat: Denies earache, rhinitis/nasal congestion, injury, recurrent sore throat, persistent hoarseness, decayed teeth/toothaches, ringing or buzzing in the ears. Difficulty hearing Cardiovascular: Denies chest pain, tightness, palpitations, swelling in legs/feet, fainting, difficulty breathing when lying down but gets better when sitting up.   Respiratory: Positive for shortness of breath, cough, painful breathing in the past but denies sputum and wheezing. Had bronchitis and pna diagnosed and is completing his abx.  Sleep: per HPI  Gastrointestinal: Denies regular problems with difficulty swallowing, nausea, abdominal pain, diarrhea, constipation, heartburn. He had heartburn, difficulty swallowing, nausea, and abdominal pain in February.  Genitourinary: Denies  blood in urine, discharge, frequent urination.   Musculoskeletal: Positive for painful joints and sore muscles but denies back pain. He has his shoes daily.  Integumentary: Denies rashes, lumps, color changes.   Neurological: Denies frequent headaches,weakness, dizziness.    PHYSICAL EXAM  MSEW    BP (!) 164/82   Pulse (!) 104   Resp 16   Ht 1.702 m (5' 7\")   Wt 92.5 kg (204 lb)   SpO2 94%   BMI 31.95 kg/m²   Appearance: Well-nourished, well-developed, no acute distress  Eyes:  PERRLA, EOMI  ENMT: without lesions, deformities;hearing grossly intact; tongue normal, posterior pharynx without erythema or exudate; Mallampati classification:   Neck: Supple, trachea midline, no masses  Respiratory effort:  No intercostal retractions or use of accessory muscles  Lung auscultation:  No wheezes rhonchi rubs or rales  Cardiac: No murmurs, rubs, or gallops; regular rhythm, normal rate; no edema  Abdomen:  No tenderness, no organomegaly  Musculoskeletal:  Grossly normal; gait and station normal; digits and nails normal  Skin:  No rashes, petechiae, cyanosis  Neurologic: without focal " signs; oriented to person, time, place, and purpose; judgement intact  Psychiatric:  No depression, anxiety, agitation        PROBLEMS:  1. Obstructive sleep apnea syndrome     2. Obesity (BMI 30-39.9)     - OBESITY COUNSELING (No Charge): Patient identified as having weight management issue.  Appropriate orders and counseling given.     3. Other chronic pulmonary embolism without acute cor pulmonale (CMS-HCC)     4. Gastroesophageal reflux disease, esophagitis presence not specified     5. Tobacco abuse - ongoing, advised to stop       6. Hyperlipidemia, unspecified hyperlipidemia type    7. Query hypertension - will see cardiology soon    8. Tachycardia - cardiology appt soon    PLAN:     Return in about 6 months (around 11/30/2018).    Has been advised to continue the current auto-titrating cpap 5-12 cmH2O , clean equipment frequently, and get new mask and supplies as allowed by insurance and DME. Advised about potential problems including nasal obstruction/stuffiness, mask leak or discomfort , frequent awakenings, chill or dryness of the upper airway, noise, inconvenience, and lack of benefit. Recommend an earlier appointment, if significant treatment barriers develop.

## 2018-06-18 ENCOUNTER — HOSPITAL ENCOUNTER (OUTPATIENT)
Dept: LAB | Facility: MEDICAL CENTER | Age: 43
End: 2018-06-18
Attending: NURSE PRACTITIONER
Payer: COMMERCIAL

## 2018-06-18 LAB
T4 FREE SERPL-MCNC: 0.77 NG/DL (ref 0.53–1.43)
TSH SERPL DL<=0.005 MIU/L-ACNC: 1.27 UIU/ML (ref 0.38–5.33)

## 2018-06-18 PROCEDURE — 84443 ASSAY THYROID STIM HORMONE: CPT

## 2018-06-18 PROCEDURE — 84439 ASSAY OF FREE THYROXINE: CPT

## 2018-06-18 PROCEDURE — 36415 COLL VENOUS BLD VENIPUNCTURE: CPT

## 2018-06-28 DIAGNOSIS — R11.2 NON-INTRACTABLE VOMITING WITH NAUSEA, UNSPECIFIED VOMITING TYPE: ICD-10-CM

## 2018-06-28 RX ORDER — PROMETHAZINE HYDROCHLORIDE 25 MG/1
25 TABLET ORAL EVERY 6 HOURS PRN
Qty: 30 TAB | Refills: 0 | Status: SHIPPED | OUTPATIENT
Start: 2018-06-28 | End: 2018-10-02 | Stop reason: SDUPTHER

## 2018-07-11 DIAGNOSIS — K21.9 GASTROESOPHAGEAL REFLUX DISEASE, ESOPHAGITIS PRESENCE NOT SPECIFIED: ICD-10-CM

## 2018-07-12 RX ORDER — RANITIDINE 150 MG/1
TABLET ORAL
Qty: 60 TAB | Refills: 3 | Status: SHIPPED | OUTPATIENT
Start: 2018-07-12 | End: 2018-12-30 | Stop reason: SDUPTHER

## 2018-09-30 ENCOUNTER — HOSPITAL ENCOUNTER (OUTPATIENT)
Facility: MEDICAL CENTER | Age: 43
End: 2018-10-01
Attending: EMERGENCY MEDICINE | Admitting: HOSPITALIST
Payer: COMMERCIAL

## 2018-09-30 ENCOUNTER — APPOINTMENT (OUTPATIENT)
Dept: RADIOLOGY | Facility: MEDICAL CENTER | Age: 43
End: 2018-09-30
Attending: EMERGENCY MEDICINE
Payer: COMMERCIAL

## 2018-09-30 PROBLEM — R11.15 CYCLICAL VOMITING SYNDROME: Status: ACTIVE | Noted: 2018-09-30

## 2018-09-30 PROBLEM — A41.9 SEPSIS (HCC): Status: ACTIVE | Noted: 2018-09-30

## 2018-09-30 PROBLEM — K52.9 COLITIS: Status: ACTIVE | Noted: 2018-09-30

## 2018-09-30 LAB
ALBUMIN SERPL BCP-MCNC: 5.1 G/DL (ref 3.2–4.9)
ALBUMIN/GLOB SERPL: 1.6 G/DL
ALP SERPL-CCNC: 105 U/L (ref 30–99)
ALT SERPL-CCNC: 28 U/L (ref 2–50)
ANION GAP SERPL CALC-SCNC: 12 MMOL/L (ref 0–11.9)
APPEARANCE UR: CLEAR
AST SERPL-CCNC: 29 U/L (ref 12–45)
BASOPHILS # BLD AUTO: 0.2 % (ref 0–1.8)
BASOPHILS # BLD: 0.05 K/UL (ref 0–0.12)
BILIRUB SERPL-MCNC: 0.6 MG/DL (ref 0.1–1.5)
BILIRUB UR QL STRIP.AUTO: ABNORMAL
BUN SERPL-MCNC: 16 MG/DL (ref 8–22)
CALCIUM SERPL-MCNC: 9.7 MG/DL (ref 8.4–10.2)
CHLORIDE SERPL-SCNC: 107 MMOL/L (ref 96–112)
CO2 SERPL-SCNC: 18 MMOL/L (ref 20–33)
COLOR UR: YELLOW
CREAT SERPL-MCNC: 1.02 MG/DL (ref 0.5–1.4)
EOSINOPHIL # BLD AUTO: 0 K/UL (ref 0–0.51)
EOSINOPHIL NFR BLD: 0 % (ref 0–6.9)
ERYTHROCYTE [DISTWIDTH] IN BLOOD BY AUTOMATED COUNT: 38.5 FL (ref 35.9–50)
GLOBULIN SER CALC-MCNC: 3.2 G/DL (ref 1.9–3.5)
GLUCOSE SERPL-MCNC: 177 MG/DL (ref 65–99)
GLUCOSE UR STRIP.AUTO-MCNC: NEGATIVE MG/DL
HCT VFR BLD AUTO: 48.4 % (ref 42–52)
HGB BLD-MCNC: 17 G/DL (ref 14–18)
IMM GRANULOCYTES # BLD AUTO: 0.29 K/UL (ref 0–0.11)
IMM GRANULOCYTES NFR BLD AUTO: 1.1 % (ref 0–0.9)
KETONES UR STRIP.AUTO-MCNC: 40 MG/DL
LACTATE BLD-SCNC: 3.1 MMOL/L (ref 0.5–2)
LEUKOCYTE ESTERASE UR QL STRIP.AUTO: NEGATIVE
LIPASE SERPL-CCNC: 16 U/L (ref 7–58)
LYMPHOCYTES # BLD AUTO: 1.53 K/UL (ref 1–4.8)
LYMPHOCYTES NFR BLD: 5.7 % (ref 22–41)
MCH RBC QN AUTO: 29.3 PG (ref 27–33)
MCHC RBC AUTO-ENTMCNC: 35.1 G/DL (ref 33.7–35.3)
MCV RBC AUTO: 83.3 FL (ref 81.4–97.8)
MICRO URNS: ABNORMAL
MONOCYTES # BLD AUTO: 1.2 K/UL (ref 0–0.85)
MONOCYTES NFR BLD AUTO: 4.5 % (ref 0–13.4)
NEUTROPHILS # BLD AUTO: 23.88 K/UL (ref 1.82–7.42)
NEUTROPHILS NFR BLD: 88.5 % (ref 44–72)
NITRITE UR QL STRIP.AUTO: NEGATIVE
NRBC # BLD AUTO: 0 K/UL
NRBC BLD-RTO: 0 /100 WBC
PH UR STRIP.AUTO: 7.5 [PH]
PLATELET # BLD AUTO: 153 K/UL (ref 164–446)
PMV BLD AUTO: 10.3 FL (ref 9–12.9)
POTASSIUM SERPL-SCNC: 3.9 MMOL/L (ref 3.6–5.5)
PROT SERPL-MCNC: 8.3 G/DL (ref 6–8.2)
PROT UR QL STRIP: NEGATIVE MG/DL
RBC # BLD AUTO: 5.81 M/UL (ref 4.7–6.1)
RBC UR QL AUTO: NEGATIVE
SODIUM SERPL-SCNC: 137 MMOL/L (ref 135–145)
SP GR UR STRIP.AUTO: 1.01
WBC # BLD AUTO: 27 K/UL (ref 4.8–10.8)

## 2018-09-30 PROCEDURE — 74022 RADEX COMPL AQT ABD SERIES: CPT

## 2018-09-30 PROCEDURE — 81003 URINALYSIS AUTO W/O SCOPE: CPT

## 2018-09-30 PROCEDURE — 80053 COMPREHEN METABOLIC PANEL: CPT

## 2018-09-30 PROCEDURE — 96375 TX/PRO/DX INJ NEW DRUG ADDON: CPT

## 2018-09-30 PROCEDURE — C9113 INJ PANTOPRAZOLE SODIUM, VIA: HCPCS | Performed by: HOSPITALIST

## 2018-09-30 PROCEDURE — 85025 COMPLETE CBC W/AUTO DIFF WBC: CPT

## 2018-09-30 PROCEDURE — 99285 EMERGENCY DEPT VISIT HI MDM: CPT

## 2018-09-30 PROCEDURE — 700105 HCHG RX REV CODE 258: Performed by: EMERGENCY MEDICINE

## 2018-09-30 PROCEDURE — 36415 COLL VENOUS BLD VENIPUNCTURE: CPT

## 2018-09-30 PROCEDURE — 96361 HYDRATE IV INFUSION ADD-ON: CPT

## 2018-09-30 PROCEDURE — 83605 ASSAY OF LACTIC ACID: CPT

## 2018-09-30 PROCEDURE — 700111 HCHG RX REV CODE 636 W/ 250 OVERRIDE (IP): Performed by: HOSPITALIST

## 2018-09-30 PROCEDURE — 99219 PR INITIAL OBSERVATION CARE,LEVL II: CPT | Performed by: HOSPITALIST

## 2018-09-30 PROCEDURE — 83036 HEMOGLOBIN GLYCOSYLATED A1C: CPT

## 2018-09-30 PROCEDURE — 74177 CT ABD & PELVIS W/CONTRAST: CPT

## 2018-09-30 PROCEDURE — 700102 HCHG RX REV CODE 250 W/ 637 OVERRIDE(OP): Performed by: EMERGENCY MEDICINE

## 2018-09-30 PROCEDURE — 94760 N-INVAS EAR/PLS OXIMETRY 1: CPT

## 2018-09-30 PROCEDURE — 80307 DRUG TEST PRSMV CHEM ANLYZR: CPT

## 2018-09-30 PROCEDURE — 700105 HCHG RX REV CODE 258: Performed by: HOSPITALIST

## 2018-09-30 PROCEDURE — A9270 NON-COVERED ITEM OR SERVICE: HCPCS | Performed by: EMERGENCY MEDICINE

## 2018-09-30 PROCEDURE — G0378 HOSPITAL OBSERVATION PER HR: HCPCS

## 2018-09-30 PROCEDURE — 96374 THER/PROPH/DIAG INJ IV PUSH: CPT

## 2018-09-30 PROCEDURE — 700117 HCHG RX CONTRAST REV CODE 255: Performed by: EMERGENCY MEDICINE

## 2018-09-30 PROCEDURE — 83690 ASSAY OF LIPASE: CPT

## 2018-09-30 PROCEDURE — 700111 HCHG RX REV CODE 636 W/ 250 OVERRIDE (IP): Performed by: EMERGENCY MEDICINE

## 2018-09-30 RX ORDER — SODIUM CHLORIDE 9 MG/ML
INJECTION, SOLUTION INTRAVENOUS CONTINUOUS
Status: DISCONTINUED | OUTPATIENT
Start: 2018-09-30 | End: 2018-10-01 | Stop reason: HOSPADM

## 2018-09-30 RX ORDER — SODIUM CHLORIDE, SODIUM LACTATE, POTASSIUM CHLORIDE, CALCIUM CHLORIDE 600; 310; 30; 20 MG/100ML; MG/100ML; MG/100ML; MG/100ML
2000 INJECTION, SOLUTION INTRAVENOUS ONCE
Status: COMPLETED | OUTPATIENT
Start: 2018-09-30 | End: 2018-09-30

## 2018-09-30 RX ORDER — DIPHENHYDRAMINE HYDROCHLORIDE 50 MG/ML
50 INJECTION INTRAMUSCULAR; INTRAVENOUS ONCE
Status: COMPLETED | OUTPATIENT
Start: 2018-09-30 | End: 2018-09-30

## 2018-09-30 RX ORDER — ONDANSETRON 2 MG/ML
4 INJECTION INTRAMUSCULAR; INTRAVENOUS EVERY 4 HOURS PRN
Status: DISCONTINUED | OUTPATIENT
Start: 2018-09-30 | End: 2018-10-01 | Stop reason: HOSPADM

## 2018-09-30 RX ORDER — METRONIDAZOLE 500 MG/1
500 TABLET ORAL ONCE
Status: COMPLETED | OUTPATIENT
Start: 2018-09-30 | End: 2018-09-30

## 2018-09-30 RX ORDER — HALOPERIDOL 5 MG/ML
5 INJECTION INTRAMUSCULAR ONCE
Status: COMPLETED | OUTPATIENT
Start: 2018-09-30 | End: 2018-09-30

## 2018-09-30 RX ORDER — PANTOPRAZOLE SODIUM 40 MG/10ML
INJECTION, POWDER, LYOPHILIZED, FOR SOLUTION INTRAVENOUS
Status: COMPLETED
Start: 2018-09-30 | End: 2018-09-30

## 2018-09-30 RX ORDER — BISACODYL 10 MG
10 SUPPOSITORY, RECTAL RECTAL
Status: DISCONTINUED | OUTPATIENT
Start: 2018-09-30 | End: 2018-10-01 | Stop reason: HOSPADM

## 2018-09-30 RX ORDER — POLYETHYLENE GLYCOL 3350 17 G/17G
1 POWDER, FOR SOLUTION ORAL
Status: DISCONTINUED | OUTPATIENT
Start: 2018-09-30 | End: 2018-10-01 | Stop reason: HOSPADM

## 2018-09-30 RX ORDER — AMOXICILLIN 250 MG
2 CAPSULE ORAL 2 TIMES DAILY
Status: DISCONTINUED | OUTPATIENT
Start: 2018-09-30 | End: 2018-10-01 | Stop reason: HOSPADM

## 2018-09-30 RX ORDER — CIPROFLOXACIN 500 MG/1
500 TABLET, FILM COATED ORAL ONCE
Status: COMPLETED | OUTPATIENT
Start: 2018-09-30 | End: 2018-09-30

## 2018-09-30 RX ADMIN — METRONIDAZOLE 500 MG: 500 TABLET ORAL at 22:45

## 2018-09-30 RX ADMIN — HALOPERIDOL LACTATE 5 MG: 5 INJECTION, SOLUTION INTRAMUSCULAR at 20:00

## 2018-09-30 RX ADMIN — SODIUM CHLORIDE, POTASSIUM CHLORIDE, SODIUM LACTATE AND CALCIUM CHLORIDE 2000 ML: 600; 310; 30; 20 INJECTION, SOLUTION INTRAVENOUS at 20:35

## 2018-09-30 RX ADMIN — CIPROFLOXACIN 500 MG: 500 TABLET, FILM COATED ORAL at 22:45

## 2018-09-30 RX ADMIN — SODIUM CHLORIDE 40 MG: 9 INJECTION, SOLUTION INTRAVENOUS at 23:45

## 2018-09-30 RX ADMIN — SODIUM CHLORIDE 125 ML: 9 INJECTION, SOLUTION INTRAVENOUS at 23:29

## 2018-09-30 RX ADMIN — HYDROMORPHONE HYDROCHLORIDE 1 MG: 1 INJECTION, SOLUTION INTRAMUSCULAR; INTRAVENOUS; SUBCUTANEOUS at 20:21

## 2018-09-30 RX ADMIN — DIPHENHYDRAMINE HYDROCHLORIDE 50 MG: 50 INJECTION INTRAMUSCULAR; INTRAVENOUS at 20:00

## 2018-09-30 RX ADMIN — IOHEXOL 100 ML: 350 INJECTION, SOLUTION INTRAVENOUS at 21:18

## 2018-09-30 ASSESSMENT — ENCOUNTER SYMPTOMS
NAUSEA: 1
BRUISES/BLEEDS EASILY: 0
NECK PAIN: 0
VOMITING: 1
DEPRESSION: 0
PALPITATIONS: 0
SORE THROAT: 0
DIZZINESS: 0
SHORTNESS OF BREATH: 0
BLURRED VISION: 0
FEVER: 0
MYALGIAS: 0
COUGH: 0
DOUBLE VISION: 0
HEADACHES: 0
DIARRHEA: 0
INSOMNIA: 0
WEAKNESS: 0

## 2018-09-30 ASSESSMENT — COGNITIVE AND FUNCTIONAL STATUS - GENERAL
SUGGESTED CMS G CODE MODIFIER DAILY ACTIVITY: CH
DAILY ACTIVITIY SCORE: 24
SUGGESTED CMS G CODE MODIFIER MOBILITY: CH
MOBILITY SCORE: 24

## 2018-09-30 ASSESSMENT — COPD QUESTIONNAIRES
DURING THE PAST 4 WEEKS HOW MUCH DID YOU FEEL SHORT OF BREATH: NONE/LITTLE OF THE TIME
COPD SCREENING SCORE: 4
IN THE PAST 12 MONTHS DO YOU DO LESS THAN YOU USED TO BECAUSE OF YOUR BREATHING PROBLEMS: DISAGREE/UNSURE
HAVE YOU SMOKED AT LEAST 100 CIGARETTES IN YOUR ENTIRE LIFE: YES
DO YOU EVER COUGH UP ANY MUCUS OR PHLEGM?: YES, EVERY DAY

## 2018-09-30 ASSESSMENT — PAIN SCALES - GENERAL
PAINLEVEL_OUTOF10: 4
PAINLEVEL_OUTOF10: 3
PAINLEVEL_OUTOF10: 7

## 2018-09-30 ASSESSMENT — PATIENT HEALTH QUESTIONNAIRE - PHQ9
2. FEELING DOWN, DEPRESSED, IRRITABLE, OR HOPELESS: NOT AT ALL
SUM OF ALL RESPONSES TO PHQ9 QUESTIONS 1 AND 2: 0
1. LITTLE INTEREST OR PLEASURE IN DOING THINGS: NOT AT ALL

## 2018-09-30 ASSESSMENT — LIFESTYLE VARIABLES: EVER_SMOKED: YES

## 2018-09-30 ASSESSMENT — PAIN DESCRIPTION - DESCRIPTORS: DESCRIPTORS: ACHING

## 2018-10-01 VITALS
BODY MASS INDEX: 31.56 KG/M2 | WEIGHT: 201.06 LBS | OXYGEN SATURATION: 94 % | HEIGHT: 67 IN | RESPIRATION RATE: 18 BRPM | TEMPERATURE: 98.8 F | HEART RATE: 94 BPM | DIASTOLIC BLOOD PRESSURE: 78 MMHG | SYSTOLIC BLOOD PRESSURE: 126 MMHG

## 2018-10-01 PROBLEM — K52.9 COLITIS: Status: RESOLVED | Noted: 2018-09-30 | Resolved: 2018-10-01

## 2018-10-01 PROBLEM — A41.9 SEPSIS (HCC): Status: RESOLVED | Noted: 2018-09-30 | Resolved: 2018-10-01

## 2018-10-01 PROBLEM — R73.9 HYPERGLYCEMIA: Status: RESOLVED | Noted: 2018-05-20 | Resolved: 2018-10-01

## 2018-10-01 PROBLEM — R11.15 CYCLICAL VOMITING SYNDROME: Status: RESOLVED | Noted: 2018-09-30 | Resolved: 2018-10-01

## 2018-10-01 LAB
AMPHET UR QL SCN: NEGATIVE
ANION GAP SERPL CALC-SCNC: 3 MMOL/L (ref 0–11.9)
BARBITURATES UR QL SCN: NEGATIVE
BASOPHILS # BLD AUTO: 0.2 % (ref 0–1.8)
BASOPHILS # BLD: 0.03 K/UL (ref 0–0.12)
BENZODIAZ UR QL SCN: NEGATIVE
BUN SERPL-MCNC: 11 MG/DL (ref 8–22)
BZE UR QL SCN: NEGATIVE
CALCIUM SERPL-MCNC: 8.5 MG/DL (ref 8.4–10.2)
CANNABINOIDS UR QL SCN: POSITIVE
CHLORIDE SERPL-SCNC: 109 MMOL/L (ref 96–112)
CO2 SERPL-SCNC: 25 MMOL/L (ref 20–33)
CREAT SERPL-MCNC: 0.73 MG/DL (ref 0.5–1.4)
EOSINOPHIL # BLD AUTO: 0 K/UL (ref 0–0.51)
EOSINOPHIL NFR BLD: 0 % (ref 0–6.9)
ERYTHROCYTE [DISTWIDTH] IN BLOOD BY AUTOMATED COUNT: 39.2 FL (ref 35.9–50)
EST. AVERAGE GLUCOSE BLD GHB EST-MCNC: 123 MG/DL
GLUCOSE SERPL-MCNC: 94 MG/DL (ref 65–99)
HBA1C MFR BLD: 5.9 % (ref 0–5.6)
HCT VFR BLD AUTO: 40.5 % (ref 42–52)
HGB BLD-MCNC: 13.9 G/DL (ref 14–18)
IMM GRANULOCYTES # BLD AUTO: 0.09 K/UL (ref 0–0.11)
IMM GRANULOCYTES NFR BLD AUTO: 0.5 % (ref 0–0.9)
LACTATE BLD-SCNC: 1.3 MMOL/L (ref 0.5–2)
LACTATE BLD-SCNC: 1.4 MMOL/L (ref 0.5–2)
LACTATE BLD-SCNC: 1.5 MMOL/L (ref 0.5–2)
LYMPHOCYTES # BLD AUTO: 2.04 K/UL (ref 1–4.8)
LYMPHOCYTES NFR BLD: 11.8 % (ref 22–41)
MCH RBC QN AUTO: 29.1 PG (ref 27–33)
MCHC RBC AUTO-ENTMCNC: 34.3 G/DL (ref 33.7–35.3)
MCV RBC AUTO: 84.9 FL (ref 81.4–97.8)
METHADONE UR QL SCN: NEGATIVE
MONOCYTES # BLD AUTO: 0.8 K/UL (ref 0–0.85)
MONOCYTES NFR BLD AUTO: 4.6 % (ref 0–13.4)
NEUTROPHILS # BLD AUTO: 14.32 K/UL (ref 1.82–7.42)
NEUTROPHILS NFR BLD: 82.9 % (ref 44–72)
NRBC # BLD AUTO: 0 K/UL
NRBC BLD-RTO: 0 /100 WBC
OPIATES UR QL SCN: POSITIVE
OXYCODONE UR QL SCN: NEGATIVE
PCP UR QL SCN: NEGATIVE
PLATELET # BLD AUTO: 127 K/UL (ref 164–446)
PMV BLD AUTO: 10.1 FL (ref 9–12.9)
POTASSIUM SERPL-SCNC: 3.7 MMOL/L (ref 3.6–5.5)
PROPOXYPH UR QL SCN: NEGATIVE
RBC # BLD AUTO: 4.77 M/UL (ref 4.7–6.1)
SODIUM SERPL-SCNC: 137 MMOL/L (ref 135–145)
WBC # BLD AUTO: 17.3 K/UL (ref 4.8–10.8)

## 2018-10-01 PROCEDURE — 85025 COMPLETE CBC W/AUTO DIFF WBC: CPT

## 2018-10-01 PROCEDURE — 700102 HCHG RX REV CODE 250 W/ 637 OVERRIDE(OP): Performed by: HOSPITALIST

## 2018-10-01 PROCEDURE — 700111 HCHG RX REV CODE 636 W/ 250 OVERRIDE (IP): Performed by: HOSPITALIST

## 2018-10-01 PROCEDURE — 96375 TX/PRO/DX INJ NEW DRUG ADDON: CPT

## 2018-10-01 PROCEDURE — 80048 BASIC METABOLIC PNL TOTAL CA: CPT

## 2018-10-01 PROCEDURE — A9270 NON-COVERED ITEM OR SERVICE: HCPCS | Performed by: HOSPITALIST

## 2018-10-01 PROCEDURE — G0378 HOSPITAL OBSERVATION PER HR: HCPCS

## 2018-10-01 PROCEDURE — 700105 HCHG RX REV CODE 258: Performed by: HOSPITALIST

## 2018-10-01 PROCEDURE — 83605 ASSAY OF LACTIC ACID: CPT | Mod: 91

## 2018-10-01 PROCEDURE — 87040 BLOOD CULTURE FOR BACTERIA: CPT | Mod: 91

## 2018-10-01 PROCEDURE — 96376 TX/PRO/DX INJ SAME DRUG ADON: CPT

## 2018-10-01 PROCEDURE — 99217 PR OBSERVATION CARE DISCHARGE: CPT | Performed by: HOSPITALIST

## 2018-10-01 RX ORDER — MORPHINE SULFATE 4 MG/ML
2 INJECTION, SOLUTION INTRAMUSCULAR; INTRAVENOUS
Status: DISCONTINUED | OUTPATIENT
Start: 2018-10-01 | End: 2018-10-01 | Stop reason: HOSPADM

## 2018-10-01 RX ORDER — AMOXICILLIN AND CLAVULANATE POTASSIUM 875; 125 MG/1; MG/1
1 TABLET, FILM COATED ORAL 2 TIMES DAILY
Qty: 10 TAB | Refills: 0 | Status: ON HOLD | OUTPATIENT
Start: 2018-10-01 | End: 2018-10-09

## 2018-10-01 RX ADMIN — ONDANSETRON 4 MG: 2 INJECTION INTRAMUSCULAR; INTRAVENOUS at 00:02

## 2018-10-01 RX ADMIN — ONDANSETRON 4 MG: 2 INJECTION INTRAMUSCULAR; INTRAVENOUS at 11:32

## 2018-10-01 RX ADMIN — SENNOSIDES AND DOCUSATE SODIUM 2 TABLET: 8.6; 5 TABLET ORAL at 17:05

## 2018-10-01 RX ADMIN — MORPHINE SULFATE 2 MG: 4 INJECTION INTRAVENOUS at 01:26

## 2018-10-01 RX ADMIN — SODIUM CHLORIDE: 9 INJECTION, SOLUTION INTRAVENOUS at 17:05

## 2018-10-01 RX ADMIN — SODIUM CHLORIDE: 9 INJECTION, SOLUTION INTRAVENOUS at 07:35

## 2018-10-01 ASSESSMENT — PAIN SCALES - GENERAL
PAINLEVEL_OUTOF10: 3
PAINLEVEL_OUTOF10: 7

## 2018-10-01 NOTE — PROGRESS NOTES
Pt arrived to floor via gurney. Pt ambulated to bed without assistance. Safety precautions in place. Family at bedside.

## 2018-10-01 NOTE — PROGRESS NOTES
"Patient presents to ED with reports of left arm pain, warmth, swelling and redness X 1 week and has worsened over the past few days. Patient afebrile. Pt reports IV drug use but denies injecting anything into his left arm. Capillary refill and pulses WNL to left arm. Patient denies any n/v/d but reports lower abdominal pain and states \" I think it is just hunger pain, nothing tastes good anymore.\" patient reports that he has not been taking any of his prescibed medications including his antiretroviral's for about a week d/t not having access to his medications at this time. Patient in NAD, will continue to monitor. Call light in reach.   " Meds given per MAR for increased nausea. Updated primary RN.

## 2018-10-01 NOTE — ASSESSMENT & PLAN NOTE
-Sepsis criteria: Tachycardia, leukocytosis  -IV fluids: Patient received 1 L IV fluids, will continue with 125 cc an hour normal saline  -Antibiotics: Cipro and Flagyl, first dose given in the ER  -Blood cultures ×2 done just added, possibly to be obtained after first dose of antibiotics  -Initial lactic: just added. If elevated, increase rate of IVF

## 2018-10-01 NOTE — DISCHARGE SUMMARY
Discharge Summary    CHIEF COMPLAINT ON ADMISSION  Chief Complaint   Patient presents with   • N/V   • Abdominal Pain       Reason for Admission  N/V     Admission Date  9/30/2018    CODE STATUS  Full Code    HPI & HOSPITAL COURSE  This is a 43 y.o. male here with hx of marijuana use, coming in with persistent nausea/vomiting and diarrhea. No fevers or chills. He was admitted with SIRS+, white count was elevated, as was lactic acid. Patient did not have any further n/v/d while in house, abd pain minimal, was started on CLD and advanced. Repeat labs with improving white count, lactic acidosis resolved. I suspect he was dehydrated from n/v/d. CT showed some colitis and marijuana use is likely playing a role. He is medically stable at this time with resolution of all his symptoms. He will be discharged with PO abx and counseled on cessation of marijuana use. Fu with PCP this week  He understood and agreed with the above plan       Therefore, he is discharged in good and stable condition to home with close outpatient follow-up.    The patient recovered much more quickly than anticipated on admission.    Discharge Date  10/1/18    FOLLOW UP ITEMS POST DISCHARGE  pcp    DISCHARGE DIAGNOSES  Principal Problem (Resolved):    Sepsis (HCC) POA: Unknown  Active Problems:    Gastroesophageal reflux disease with esophagitis POA: Yes    History of pulmonary embolism POA: Yes      Overview: Lower, left lung and Eliquis to be continue until 7/30/18    Dyslipidemia POA: Yes    Randle esophagus POA: Yes    Obstructive sleep apnea syndrome POA: Yes  Resolved Problems:    Colitis POA: Unknown    Cyclical vomiting syndrome POA: Unknown    Hyperglycemia POA: Yes      FOLLOW UP  Future Appointments  Date Time Provider Department Center   11/29/2018 10:00 AM [facility] None     No follow-up provider specified.    MEDICATIONS ON DISCHARGE     Medication List      CONTINUE taking these medications      Instructions   amitriptyline 100  MG Tabs  Commonly known as:  ELAVIL   Take 100 mg by mouth every evening.  Dose:  100 mg     apixaban 5mg Tabs  Commonly known as:  ELIQUIS   Take 1 Tab by mouth 2 Times a Day.  Dose:  5 mg     pantoprazole 40 MG Tbec  Commonly known as:  PROTONIX   Take 40 mg by mouth every evening.  Dose:  40 mg     promethazine 25 MG Tabs  Commonly known as:  PHENERGAN   Take 1 Tab by mouth every 6 hours as needed for Nausea/Vomiting.  Dose:  25 mg     raNITidine 150 MG Tabs  Commonly known as:  ZANTAC   Doctor's comments:  Please consider 90 day supplies to promote better adherence  TAKE ONE TABLET BY MOUTH TWICE DAILY     simvastatin 10 MG Tabs  Commonly known as:  ZOCOR   Take 10 mg by mouth every evening.  Dose:  10 mg            Allergies  Allergies   Allergen Reactions   • Fish Anaphylaxis     [place] and tuna   • Barley Grass Anaphylaxis   • Green Beans Shortness of Breath and Nausea   • Nicoderm [Nicotine] Rash, Itching and Swelling     Blisters noted to patch site   • Turkey Shortness of Breath and Nausea       DIET  Orders Placed This Encounter   Procedures   • Diet NPO     Standing Status:   Standing     Number of Occurrences:   1     Order Specific Question:   Restrict to:     Answer:   Ice Chips [2]       ACTIVITY  As tolerated.  Weight bearing as tolerated    CONSULTATIONS  none    PROCEDURES  none    LABORATORY  Lab Results   Component Value Date    SODIUM 137 10/01/2018    POTASSIUM 3.7 10/01/2018    CHLORIDE 109 10/01/2018    CO2 25 10/01/2018    GLUCOSE 94 10/01/2018    BUN 11 10/01/2018    CREATININE 0.73 10/01/2018        Lab Results   Component Value Date    WBC 17.3 (H) 10/01/2018    HEMOGLOBIN 13.9 (L) 10/01/2018    HEMATOCRIT 40.5 (L) 10/01/2018    PLATELETCT 127 (L) 10/01/2018        Total time of the discharge process exceeds 42 minutes.

## 2018-10-01 NOTE — ASSESSMENT & PLAN NOTE
-Provoked, status post left total knee surgery.  -Status post 6 months treatment with Eliquis, just completed about a month ago.

## 2018-10-01 NOTE — ASSESSMENT & PLAN NOTE
-Likely due to cannabis hyperemesis syndrome.  -He denies taking marijuana recently.  Added urine drug screen  -Treat with IV fluid hydration and Zofran and Phenergan PRN.  -We will keep the patient n.p.o. overnight with ice chips.  -Advance diet as tolerated in the morning.

## 2018-10-01 NOTE — ED TRIAGE NOTES
Presents with a hx of cyclic vomiting syndrome.  Pt C/O acute onset of diffuse abdominal pain with recurring episodes of N/V.

## 2018-10-01 NOTE — CARE PLAN
Problem: Safety  Goal: Will remain free from injury  Outcome: PROGRESSING AS EXPECTED    Goal: Will remain free from falls  Outcome: PROGRESSING AS EXPECTED      Problem: Bowel/Gastric:  Goal: Normal bowel function is maintained or improved  Outcome: PROGRESSING AS EXPECTED    Goal: Will not experience complications related to bowel motility  Outcome: PROGRESSING AS EXPECTED

## 2018-10-01 NOTE — ED NOTES
[facility] [place] fall assessment completed. Pt is High risk for fall. Interventions complete. Pt placed in yellow non slip socks, wrist band placed, sign on door. Bed locked in low position, call light in place. Personal possessions in place. Personal needs assessed. Charge nurse notified to move pt to a more visible room if available. Safety assessed. Will monitor frequently.

## 2018-10-01 NOTE — H&P
Hospital Medicine History & Physical Note    Date of Service  2018    Primary Care Physician  Andrea Machado P.A.-C.        Code Status  Full Code    Chief Complaint  Nausea, Vomiting and Diarrhea    History of Presenting Illness  43 y.o. male with history of cyclical vomiting/cannabis hyperemesis syndrome, who presents to the ER complaining of nausea and vomitin) NAUSEA AND VOMITING:  -Onset: Last night, getting worse  -Reports having over 20 episodes of NBNB vomiting  -Patient has associated mild abdominal pain: Now rated at 4/10 in intensity, diffuse, worse with vomiting.  -Patient typically has 3- 4 episodes of intractable nausea and vomiting per year, so far this year had 3 episodes.      ER course:  -Laboratory showing leukocytosis of 27,000 with left shift and CT scan is showing colitis.  Patient states he is not having diarrhea.    Side note: Patient was taking Eliquis up until last month for a PE status post his left knee replacement.  He completed 6 months of therapy/treatment and is now off Eliquis.    Review of Systems  Review of Systems   Constitutional: Negative for fever.   HENT: Negative for congestion and sore throat.    Eyes: Negative for blurred vision and double vision.   Respiratory: Negative for cough and shortness of breath.    Cardiovascular: Negative for chest pain and palpitations.   Gastrointestinal: Positive for nausea and vomiting. Negative for diarrhea.   Genitourinary: Negative for dysuria and urgency.   Musculoskeletal: Negative for myalgias and neck pain.   Skin: Negative for itching and rash.   Neurological: Negative for dizziness, weakness and headaches.   Endo/Heme/Allergies: Does not bruise/bleed easily.   Psychiatric/Behavioral: Negative for depression. The patient does not have insomnia.        Past Medical History   has a past medical history of Bronchitis; Chickenpox; GERD (gastroesophageal reflux disease); IBS (irritable bowel syndrome); Indigestion; Influenza;  Pneumonia; Pulmonary embolism (HCC); Renal disorder; and Sleep apnea.    Surgical History   has a past surgical history that includes asher by laparoscopy; tonsillectomy; other orthopedic surgery; knee replacement, total (Left, 10/2017); and tonsillectomy.     Family History  family history includes Heart Disease in his father, paternal grandfather, and paternal uncle; Hypertension in his father; Other in his mother; Sleep Apnea in his father and mother.     Social History   reports that he has been smoking Cigarettes.  He has a 11.00 pack-year smoking history. He has never used smokeless tobacco. He reports that he uses drugs, including Inhaled. He reports that he does not drink alcohol.    Allergies  Allergies   Allergen Reactions   • Fish Anaphylaxis     [place] and tuna   • Barley Grass Anaphylaxis   • Green Beans Shortness of Breath and Nausea   • Nicoderm [Nicotine] Rash, Itching and Swelling     Blisters noted to patch site   • Turkey Shortness of Breath and Nausea       Medications  Prior to Admission Medications   Prescriptions Last Dose Informant Patient Reported? Taking?   amitriptyline (ELAVIL) 100 MG Tab  at PRN Patient Yes No   Sig: Take 100 mg by mouth every evening.   apixaban (ELIQUIS) 5mg Tab   No No   Sig: Take 1 Tab by mouth 2 Times a Day.   pantoprazole (PROTONIX) 40 MG Tablet Delayed Response 9/30/2018 at 2200 Patient Yes No   Sig: Take 40 mg by mouth every evening.   promethazine (PHENERGAN) 25 MG Tab PRN  No No   Sig: Take 1 Tab by mouth every 6 hours as needed for Nausea/Vomiting.   raNITidine (ZANTAC) 150 MG Tab PRN  No No   Sig: TAKE ONE TABLET BY MOUTH TWICE DAILY   simvastatin (ZOCOR) 10 MG Tab 9/30/2018 at 2200 Patient Yes No   Sig: Take 10 mg by mouth every evening.      Facility-Administered Medications: None       Physical Exam  Temp:  [36.7 °C (98 °F)] 36.7 °C (98 °F)  Pulse:  [] 93  Resp:  [20] 20  BP: (144)/(98) 144/98    Physical Exam   Constitutional: He is oriented to  person, place, and time. He appears well-developed and well-nourished.   HENT:   Head: Normocephalic and atraumatic.   Eyes: Pupils are equal, round, and reactive to light. EOM are normal.   Neck: Normal range of motion. Neck supple.   Cardiovascular: Regular rhythm.  Tachycardia present.    Pulmonary/Chest: Effort normal and breath sounds normal.   Abdominal: Soft. Bowel sounds are normal. He exhibits no distension. There is tenderness (Mild, diffuse). There is no rebound and no guarding.   Musculoskeletal: Normal range of motion. He exhibits no edema.   Neurological: He is alert and oriented to person, place, and time.   Skin: Skin is warm and dry.   Psychiatric: He has a normal mood and affect. His behavior is normal.       Laboratory:  Recent Labs      09/30/18 2000   WBC  27.0*   RBC  5.81   HEMOGLOBIN  17.0   HEMATOCRIT  48.4   MCV  83.3   MCH  29.3   MCHC  35.1   RDW  38.5   PLATELETCT  153*   MPV  10.3     Recent Labs      09/30/18 2000   SODIUM  137   POTASSIUM  3.9   CHLORIDE  107   CO2  18*   GLUCOSE  177*   BUN  16   CREATININE  1.02   CALCIUM  9.7     Recent Labs      09/30/18 2000   ALTSGPT  28   ASTSGOT  29   ALKPHOSPHAT  105*   TBILIRUBIN  0.6   LIPASE  16   GLUCOSE  177*                 No results for input(s): TROPONINI in the last 72 hours.    Urinalysis:    No results found     Imaging:  CT-ABDOMEN-PELVIS WITH   Final Result         1.  All transverse and proximal descending colonic wall thickening, appearance suggests possible component of focal segmental colitis.   2.  Hepatomegaly      DX-ABDOMEN COMPLETE WITH AP OR PA CXR   Final Result      No cardiopulmonary abnormality or dilated bowel identified            Assessment/Plan:  I anticipate this patient is appropriate for observation status at this time.    * Sepsis (HCC)   Assessment & Plan    -Sepsis criteria: Tachycardia, leukocytosis  -IV fluids: Patient received 1 L IV fluids, will continue with 125 cc an hour normal  saline  -Antibiotics: Cipro and Flagyl, first dose given in the ER  -Blood cultures ×2 done just added, possibly to be obtained after first dose of antibiotics  -Initial lactic: just added. If elevated, increase rate of IVF          Colitis   Assessment & Plan    -Seen on CT.  All transverse and proximal descending colonic wall thickening that are likely suggesting segmental colitis.  -Patient is not having diarrhea.        Cyclical vomiting syndrome   Assessment & Plan    -Likely due to cannabis hyperemesis syndrome.  -He denies taking marijuana recently.  Added urine drug screen  -Treat with IV fluid hydration and Zofran and Phenergan PRN.  -We will keep the patient n.p.o. overnight with ice chips.  -Advance diet as tolerated in the morning.        Gastroesophageal reflux disease with esophagitis- (present on admission)   Assessment & Plan    -Patient will be n.p.o. since he cannot tolerate anything orally right now.  Will give 1 dose of Protonix IV        Obstructive sleep apnea syndrome- (present on admission)   Assessment & Plan    -CPAP of patient tolerates        Hyperglycemia- (present on admission)   Assessment & Plan    -Glucose on admission is 177.  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  Resume medications tomorrow if tolerating p.o.        History of pulmonary embolism- (present on admission)   Assessment & Plan    -Provoked, status post left total knee surgery.  -Status post 6 months treatment with Eliquis, just completed about a month ago.                VTE prophylaxis: SCD

## 2018-10-01 NOTE — ASSESSMENT & PLAN NOTE
-Patient will be n.p.o. since he cannot tolerate anything orally right now.  Will give 1 dose of Protonix IV

## 2018-10-01 NOTE — ED PROVIDER NOTES
ED Provider Note    ED Provider Note      Primary care provider: Andrea Machado P.A.-C.    CHIEF COMPLAINT  Chief Complaint   Patient presents with   • N/V   • Abdominal Pain       HPI  Jerome Cardoza Jr. is a 43 y.o. male who presents to the Emergency Department with chief complaint of abdominal pain nausea and vomiting.  Patient with history of cyclical vomiting,/cannabis hyperemesis syndrome.  Patient denies using marijuana products recently states that he has been extremely stressed out which is what usually brings out his cyclical vomiting.  He has epigastric abdominal pain pain throughout his entire abdomen that he states is from numerous episodes of vomiting.  No blood in emesis he had no diarrhea he said chills without fever he has had minimal diaphoresis no cough no congestion no chest pain no shortness breath.  He does report a sensation of pain that he states in his kidneys and feels as though is from dehydration.  No dysuria no hematuria no urinary hesitancy or C.        REVIEW OF SYSTEMS  10 systems reviewed and otherwise negative, pertinent positives and negatives listed in the history of present illness.    PAST MEDICAL HISTORY   has a past medical history of Bronchitis; Chickenpox; GERD (gastroesophageal reflux disease); IBS (irritable bowel syndrome); Indigestion; Influenza; Pneumonia; Pulmonary embolism (HCC); Renal disorder; and Sleep apnea.    SURGICAL HISTORY   has a past surgical history that includes asher by laparoscopy; tonsillectomy; other orthopedic surgery; knee replacement, total (Left, 10/2017); and tonsillectomy.    SOCIAL HISTORY  Social History   Substance Use Topics   • Smoking status: Current Every Day Smoker     Packs/day: 0.50     Years: 22.00     Types: Cigarettes   • Smokeless tobacco: Never Used   • Alcohol use No      History   Drug Use   • Types: Inhaled     Comment: marijuana occ       FAMILY HISTORY  Non-Contributory    CURRENT MEDICATIONS  Home Medications      "Reviewed by Fredo Escobedo R.N. (Registered Nurse) on 09/30/18 at 1744  Med List Status: Partial   Medication Last Dose Status   amitriptyline (ELAVIL) 100 MG Tab  Active   apixaban (ELIQUIS) 5mg Tab  Active   pantoprazole (PROTONIX) 40 MG Tablet Delayed Response 9/30/2018 Active   promethazine (PHENERGAN) 25 MG Tab PRN Active   raNITidine (ZANTAC) 150 MG Tab PRN Active   simvastatin (ZOCOR) 10 MG Tab 9/30/2018 Active                ALLERGIES  Allergies   Allergen Reactions   • Fish Anaphylaxis     [place] and tuna   • Barley Grass Anaphylaxis   • Green Beans Shortness of Breath and Nausea   • Nicoderm [Nicotine] Rash, Itching and Swelling     Blisters noted to patch site   • [place] Shortness of Breath and Nausea       PHYSICAL EXAM  VITAL SIGNS: /98   Pulse 98   Temp 36.7 °C (98 °F)   Resp 20   Ht 1.702 m (5' 7\")   Wt 87.3 kg (192 lb 7.4 oz)   SpO2 99%   BMI 30.14 kg/m²   Pulse ox interpretation: I interpret this pulse ox as normal.  Constitutional: Alert and oriented x 3, minimal distress  HEENT: Atraumatic normocephalic, pupils are equal round reactive to light extraocular movements are intact. The nares is clear, external ears are normal, mouth shows moist mucous membranes  Neck: Supple, no JVD no tracheal deviation  Cardiovascular: Regular rate and rhythm no murmur rub or gallop 2+ pulses peripherally x4  Thorax & Lungs: No respiratory distress, no wheezes rales or rhonchi, No chest tenderness.   GI: S minimal tenderness in the epigastrium no other focal tenderness no rebound or guarding positive bowel sounds nondistended  Skin: Warm dry no acute rash or lesion  Musculoskeletal: Moving all extremities with full range and 5 of 5 strength, no acute  deformity  Neurologic: Cranial nerves III through XII are grossly intact, no sensory deficit, no cerebellar dysfunction   Psychiatric: Appropriate affect for situation at this time      DIAGNOSTIC STUDIES / PROCEDURES  LABS      Results for orders placed " or performed during the hospital encounter of 09/30/18   CBC WITH DIFFERENTIAL   Result Value Ref Range    WBC 27.0 (H) 4.8 - 10.8 K/uL    RBC 5.81 4.70 - 6.10 M/uL    Hemoglobin 17.0 14.0 - 18.0 g/dL    Hematocrit 48.4 42.0 - 52.0 %    MCV 83.3 81.4 - 97.8 fL    MCH 29.3 27.0 - 33.0 pg    MCHC 35.1 33.7 - 35.3 g/dL    RDW 38.5 35.9 - 50.0 fL    Platelet Count 153 (L) 164 - 446 K/uL    MPV 10.3 9.0 - 12.9 fL    Neutrophils-Polys 88.50 (H) 44.00 - 72.00 %    Lymphocytes 5.70 (L) 22.00 - 41.00 %    Monocytes 4.50 0.00 - 13.40 %    Eosinophils 0.00 0.00 - 6.90 %    Basophils 0.20 0.00 - 1.80 %    Immature Granulocytes 1.10 (H) 0.00 - 0.90 %    Nucleated RBC 0.00 /100 WBC    Neutrophils (Absolute) 23.88 (H) 1.82 - 7.42 K/uL    Lymphs (Absolute) 1.53 1.00 - 4.80 K/uL    Monos (Absolute) 1.20 (H) 0.00 - 0.85 K/uL    Eos (Absolute) 0.00 0.00 - 0.51 K/uL    Baso (Absolute) 0.05 0.00 - 0.12 K/uL    Immature Granulocytes (abs) 0.29 (H) 0.00 - 0.11 K/uL    NRBC (Absolute) 0.00 K/uL   COMP METABOLIC PANEL   Result Value Ref Range    Sodium 137 135 - 145 mmol/L    Potassium 3.9 3.6 - 5.5 mmol/L    Chloride 107 96 - 112 mmol/L    Co2 18 (L) 20 - 33 mmol/L    Anion Gap 12.0 (H) 0.0 - 11.9    Glucose 177 (H) 65 - 99 mg/dL    Bun 16 8 - 22 mg/dL    Creatinine 1.02 0.50 - 1.40 mg/dL    Calcium 9.7 8.4 - 10.2 mg/dL    AST(SGOT) 29 12 - 45 U/L    ALT(SGPT) 28 2 - 50 U/L    Alkaline Phosphatase 105 (H) 30 - 99 U/L    Total Bilirubin 0.6 0.1 - 1.5 mg/dL    Albumin 5.1 (H) 3.2 - 4.9 g/dL    Total Protein 8.3 (H) 6.0 - 8.2 g/dL    Globulin 3.2 1.9 - 3.5 g/dL    A-G Ratio 1.6 g/dL   LIPASE   Result Value Ref Range    Lipase 16 7 - 58 U/L   ESTIMATED GFR   Result Value Ref Range    GFR If African American >60 >60 mL/min/1.73 m 2    GFR If Non African American >60 >60 mL/min/1.73 m 2   URINALYSIS,CULTURE IF INDICATED   Result Value Ref Range    Color Yellow     Character Clear     Specific Gravity 1.010 <1.035    Ph 7.5 5.0 - 8.0    Glucose  "Negative Negative mg/dL    Ketones 40 (A) Negative mg/dL    Protein Negative Negative mg/dL    Bilirubin Small (A) Negative    Nitrite Negative Negative    Leukocyte Esterase Negative Negative    Occult Blood Negative Negative    Micro Urine Req see below    LACTIC ACID   Result Value Ref Range    Lactic Acid 3.1 (H) 0.5 - 2.0 mmol/L       All labs reviewed by me.      RADIOLOGY  CT-ABDOMEN-PELVIS WITH   Final Result         1.  All transverse and proximal descending colonic wall thickening, appearance suggests possible component of focal segmental colitis.   2.  Hepatomegaly      DX-ABDOMEN COMPLETE WITH AP OR PA CXR   Final Result      No cardiopulmonary abnormality or dilated bowel identified        The radiologist's interpretation of all radiological studies have been reviewed by me.        COURSE & MEDICAL DECISION MAKING  Pertinent Labs & Imaging studies reviewed. (See chart for details)    7:17 PM - Patient seen and examined at bedside.       Patient noted to have slightly elevated blood pressure likely circumstantial secondary to presenting complaint. Referred to primary care physician for further evaluation.      Patient was given IV fluids secondary to inability to tolerate p.o. intake anion gap acidosis and ongoing GI losses.  Had improvement of symptoms with treatment.    Medical Decision Making: [place] count was 27,000 with left shift and immature granulocytosis CT scan suggest transverse and descending colitis.  Patient was given oral ciprofloxacin and Flagyl.  He had received 2 L of fluids.  Patient be admitted to the hospitalist service, Discussed with Dr. Wilson, her help with this patient greatly appreciated. Admitted in guarded condition.  /98   Pulse 97   Temp 36.7 °C (98 °F)   Resp 20   Ht 1.702 m (5' 7\")   Wt 87.3 kg (192 lb 7.4 oz)   SpO2 96%   BMI 30.14 kg/m²             FINAL IMPRESSION  1.  Abdominal pain  2.  Intractable nausea and vomiting  3.  Leukocytosis with left " shift  4.  Minor anion gap acidosis  5.  Transverse and descending colitis  7.  History of cannabis hyperemesis syndrome    This dictation has been created using voice recognition software and/or scribes. The accuracy of the dictation is limited by the abilities of the software and the expertise of the scribes. I expect there may be some errors of grammar and possibly content. I made every attempt to manually correct the errors within my dictation. However, errors related to voice recognition software and/or scribes may still exist and should be interpreted within the appropriate context.

## 2018-10-01 NOTE — ASSESSMENT & PLAN NOTE
-Seen on CT.  All transverse and proximal descending colonic wall thickening that are likely suggesting segmental colitis.  -Patient is not having diarrhea.

## 2018-10-02 DIAGNOSIS — R11.2 NON-INTRACTABLE VOMITING WITH NAUSEA, UNSPECIFIED VOMITING TYPE: ICD-10-CM

## 2018-10-02 NOTE — PROGRESS NOTES
Bedside report received from Chloé NEGRO. Plan of care discussed. Pt resting in bed with safety precautions in place.

## 2018-10-02 NOTE — DISCHARGE INSTRUCTIONS
Discharge Instructions    Discharged to home by car with relative. Discharged via wheelchair, hospital escort: Yes.  Special equipment needed: Not Applicable    Be sure to schedule a follow-up appointment with your primary care doctor or any specialists as instructed.     Discharge Plan:   Diet Plan: Discussed  Activity Level: Discussed  Smoking Cessation Offered: Patient Refused  Confirmed Follow up Appointment: Patient to Call and Schedule Appointment  Confirmed Symptoms Management: Discussed  Medication Reconciliation Updated: Yes  Influenza Vaccine Indication: Patient Refuses    I understand that a diet low in cholesterol, fat, and sodium is recommended for good health. Unless I have been given specific instructions below for another diet, I accept this instruction as my diet prescription.     Special Instructions:   Colitis  Introduction  Colitis is inflammation of the colon. Colitis may last a short time (acute) or it may last a long time (chronic).  What are the causes?  This condition may be caused by:  · Viruses.  · Bacteria.  · Reactions to medicine.  · Certain autoimmune diseases, such as Crohn disease or ulcerative colitis.  What are the signs or symptoms?  Symptoms of this condition include:  · Diarrhea.  · Passing bloody or tarry stool.  · Pain.  · Fever.  · Vomiting.  · Tiredness (fatigue).  · Weight loss.  · Bloating.  · Sudden increase in abdominal pain.  · Having fewer bowel movements than usual.  How is this diagnosed?  This condition is diagnosed with a stool test or a blood test. You may also have other tests, including X-rays, a CT scan, or a colonoscopy.  How is this treated?  Treatment may include:  · Resting the bowel. This involves not eating or drinking for a period of time.  · Fluids that are given through an IV tube.  · Medicine for pain and diarrhea.  · Antibiotic medicines.  · Cortisone medicines.  · Surgery.  Follow these instructions at home:  Eating and drinking  · Follow  instructions from your health care provider about eating or drinking restrictions.  · Drink enough fluid to keep your urine clear or pale yellow.  · Work with a dietitian to determine which foods cause your condition to flare up.  · Avoid foods that cause flare-ups.  · Eat a well-balanced diet.  Medicines  · Take over-the-counter and prescription medicines only as told by your health care provider.  · If you were prescribed an antibiotic medicine, take it as told by your health care provider. Do not stop taking the antibiotic even if you start to feel better.  General instructions  · Keep all follow-up visits as told by your health care provider. This is important.  Contact a health care provider if:  · Your symptoms do not go away.  · You develop new symptoms.  Get help right away if:  · You have a fever that does not go away with treatment.  · You develop chills.  · You have extreme weakness, fainting, or dehydration.  · You have repeated vomiting.  · You develop severe pain in your abdomen.  · You pass bloody or tarry stool.  This information is not intended to replace advice given to you by your health care provider. Make sure you discuss any questions you have with your health care provider.  Document Released: 01/25/2006 Document Revised: 05/25/2017 Document Reviewed: 04/11/2016  © 2017 Elsevier  Amoxicillin; Clavulanic Acid tablets  What is this medicine?  AMOXICILLIN; CLAVULANIC ACID (a mox i JUVENCIO in; MARYJANE dubose AS id) is a penicillin antibiotic. It is used to treat certain kinds of bacterial infections. It will not work for colds, flu, or other viral infections.  This medicine may be used for other purposes; ask your health care provider or pharmacist if you have questions.  COMMON BRAND NAME(S): Augmentin  What should I tell my health care provider before I take this medicine?  They need to know if you have any of these conditions:  -bowel disease, like colitis  -kidney disease  -liver  disease  -mononucleosis  -an unusual or allergic reaction to amoxicillin, penicillin, cephalosporin, other antibiotics, clavulanic acid, other medicines, foods, dyes, or preservatives  -pregnant or trying to get pregnant  -breast-feeding  How should I use this medicine?  Take this medicine by mouth with a full glass of water. Follow the directions on the prescription label. Take at the start of a meal. Do not crush or chew. If the tablet has a score line, you may cut it in half at the score line for easier swallowing. Take your medicine at regular intervals. Do not take your medicine more often than directed. Take all of your medicine as directed even if you think you are better. Do not skip doses or stop your medicine early.  Talk to your pediatrician regarding the use of this medicine in children. Special care may be needed.  Overdosage: If you think you have taken too much of this medicine contact a poison control center or emergency room at once.  NOTE: This medicine is only for you. Do not share this medicine with others.  What if I miss a dose?  If you miss a dose, take it as soon as you can. If it is almost time for your next dose, take only that dose. Do not take double or extra doses.  What may interact with this medicine?  -allopurinol  -anticoagulants  -birth control pills  -methotrexate  -probenecid  This list may not describe all possible interactions. Give your health care provider a list of all the medicines, herbs, non-prescription drugs, or dietary supplements you use. Also tell them if you smoke, drink alcohol, or use illegal drugs. Some items may interact with your medicine.  What should I watch for while using this medicine?  Tell your doctor or health care professional if your symptoms do not improve.  Do not treat diarrhea with over the counter products. Contact your doctor if you have diarrhea that lasts more than 2 days or if it is severe and watery.  If you have diabetes, you may get a  false-positive result for sugar in your urine. Check with your doctor or health care professional.  Birth control pills may not work properly while you are taking this medicine. Talk to your doctor about using an extra method of birth control.  What side effects may I notice from receiving this medicine?  Side effects that you should report to your doctor or health care professional as soon as possible:  -allergic reactions like skin rash, itching or hives, swelling of the face, lips, or tongue  -breathing problems  -dark urine  -fever or chills, sore throat  -redness, blistering, peeling or loosening of the skin, including inside the mouth  -seizures  -trouble passing urine or change in the amount of urine  -unusual bleeding, bruising  -unusually weak or tired  -white patches or sores in the mouth or throat  Side effects that usually do not require medical attention (report to your doctor or health care professional if they continue or are bothersome):  -diarrhea  -dizziness  -headache  -nausea, vomiting  -stomach upset  -vaginal or anal irritation  This list may not describe all possible side effects. Call your doctor for medical advice about side effects. You may report side effects to FDA at 9-919-FDA-1847.  Where should I keep my medicine?  Keep out of the reach of children.  Store at room temperature below 25 degrees C (77 degrees F). Keep container tightly closed. Throw away any unused medicine after the expiration date.  NOTE: This sheet is a summary. It may not cover all possible information. If you have questions about this medicine, talk to your doctor, pharmacist, or health care provider.  © 2018 Elsevier/Gold Standard (2009-03-12 12:04:30)      · Is patient discharged on Warfarin / Coumadin?   No     Depression / Suicide Risk    As you are discharged from this [facility] facility, it is important to learn how to keep safe from harming yourself.    Recognize the warning signs:  · Abrupt changes in  personality, positive or negative- including increase in energy   · Giving away possessions  · Change in eating patterns- significant weight changes-  positive or negative  · Change in sleeping patterns- unable to sleep or sleeping all the time   · Unwillingness or inability to communicate  · Depression  · Unusual sadness, discouragement and loneliness  · Talk of wanting to die  · Neglect of personal appearance   · Rebelliousness- reckless behavior  · Withdrawal from people/activities they love  · Confusion- inability to concentrate     If you or a loved one observes any of these behaviors or has concerns about self-harm, here's what you can do:  · Talk about it- your feelings and reasons for harming yourself  · Remove any means that you might use to hurt yourself (examples: pills, rope, extension cords, firearm)  · Get professional help from the community (Mental Health, Substance Abuse, psychological counseling)  · Do not be alone:Call your Safe Contact- someone whom you trust who will be there for you.  · Call your local CRISIS HOTLINE 016-8340 or 582-080-8219  · Call your local Children's Mobile Crisis Response Team [place] (231) 310-1838 or www.ToVieFor  · Call the toll free National Suicide Prevention Hotlines   · National Suicide Prevention Lifeline 430-129-PBFR (4993)  · National Hope Line Network 800-SUICIDE (892-8671)

## 2018-10-02 NOTE — PROGRESS NOTES
Pt's DC packet completed. Discharge instructions covered with pt and pt's family. All questions answered. Pt declined wheelchair and ambulated from floor with family member.

## 2018-10-03 RX ORDER — PROMETHAZINE HYDROCHLORIDE 25 MG/1
25 TABLET ORAL EVERY 6 HOURS PRN
Qty: 30 TAB | Refills: 0 | Status: SHIPPED
Start: 2018-10-03 | End: 2020-01-07

## 2018-10-06 ENCOUNTER — HOSPITAL ENCOUNTER (INPATIENT)
Facility: MEDICAL CENTER | Age: 43
LOS: 3 days | DRG: 641 | End: 2018-10-09
Attending: EMERGENCY MEDICINE | Admitting: INTERNAL MEDICINE
Payer: COMMERCIAL

## 2018-10-06 ENCOUNTER — APPOINTMENT (OUTPATIENT)
Dept: RADIOLOGY | Facility: MEDICAL CENTER | Age: 43
DRG: 641 | End: 2018-10-06
Attending: EMERGENCY MEDICINE
Payer: COMMERCIAL

## 2018-10-06 DIAGNOSIS — R55 SYNCOPE, UNSPECIFIED SYNCOPE TYPE: ICD-10-CM

## 2018-10-06 DIAGNOSIS — M54.6 THORACIC SPINE PAIN: ICD-10-CM

## 2018-10-06 DIAGNOSIS — A41.9 SEPSIS, DUE TO UNSPECIFIED ORGANISM: ICD-10-CM

## 2018-10-06 DIAGNOSIS — N28.9 RENAL INSUFFICIENCY: ICD-10-CM

## 2018-10-06 PROBLEM — K57.92 ACUTE DIVERTICULITIS: Status: ACTIVE | Noted: 2018-10-06

## 2018-10-06 PROBLEM — E87.20 LACTIC ACIDOSIS: Status: ACTIVE | Noted: 2018-10-06

## 2018-10-06 PROBLEM — E83.52 HYPERCALCEMIA: Status: ACTIVE | Noted: 2018-10-06

## 2018-10-06 PROBLEM — R79.89 ELEVATED TROPONIN: Status: ACTIVE | Noted: 2018-10-06

## 2018-10-06 PROBLEM — N17.9 ACUTE KIDNEY INJURY (HCC): Status: ACTIVE | Noted: 2018-10-06

## 2018-10-06 LAB
ALBUMIN SERPL BCP-MCNC: 5.1 G/DL (ref 3.2–4.9)
ALBUMIN/GLOB SERPL: 1.8 G/DL
ALP SERPL-CCNC: 107 U/L (ref 30–99)
ALT SERPL-CCNC: 35 U/L (ref 2–50)
AMPHET UR QL SCN: NEGATIVE
ANION GAP SERPL CALC-SCNC: 19 MMOL/L (ref 0–11.9)
APTT PPP: 27.4 SEC (ref 24.7–36)
AST SERPL-CCNC: 29 U/L (ref 12–45)
BACTERIA BLD CULT: NORMAL
BACTERIA BLD CULT: NORMAL
BARBITURATES UR QL SCN: NEGATIVE
BASOPHILS # BLD AUTO: 0.2 % (ref 0–1.8)
BASOPHILS # BLD: 0.03 K/UL (ref 0–0.12)
BENZODIAZ UR QL SCN: NEGATIVE
BILIRUB SERPL-MCNC: 0.9 MG/DL (ref 0.1–1.5)
BNP SERPL-MCNC: 30 PG/ML (ref 0–100)
BUN SERPL-MCNC: 33 MG/DL (ref 8–22)
BZE UR QL SCN: NEGATIVE
CALCIUM SERPL-MCNC: 11.4 MG/DL (ref 8.5–10.5)
CANNABINOIDS UR QL SCN: POSITIVE
CHLORIDE SERPL-SCNC: 95 MMOL/L (ref 96–112)
CO2 SERPL-SCNC: 21 MMOL/L (ref 20–33)
CREAT SERPL-MCNC: 1.96 MG/DL (ref 0.5–1.4)
EOSINOPHIL # BLD AUTO: 0.01 K/UL (ref 0–0.51)
EOSINOPHIL NFR BLD: 0.1 % (ref 0–6.9)
ERYTHROCYTE [DISTWIDTH] IN BLOOD BY AUTOMATED COUNT: 35.1 FL (ref 35.9–50)
ETHANOL BLD-MCNC: 0 G/DL
GLOBULIN SER CALC-MCNC: 2.9 G/DL (ref 1.9–3.5)
GLUCOSE SERPL-MCNC: 157 MG/DL (ref 65–99)
GRAM STN SPEC: NORMAL
HCT VFR BLD AUTO: 51.4 % (ref 42–52)
HGB BLD-MCNC: 18.3 G/DL (ref 14–18)
IMM GRANULOCYTES # BLD AUTO: 0.08 K/UL (ref 0–0.11)
IMM GRANULOCYTES NFR BLD AUTO: 0.5 % (ref 0–0.9)
INR PPP: 0.99 (ref 0.87–1.13)
LACTATE BLD-SCNC: 1.9 MMOL/L (ref 0.5–2)
LACTATE BLD-SCNC: 4.6 MMOL/L (ref 0.5–2)
LIPASE SERPL-CCNC: 23 U/L (ref 11–82)
LYMPHOCYTES # BLD AUTO: 2.41 K/UL (ref 1–4.8)
LYMPHOCYTES NFR BLD: 16.2 % (ref 22–41)
MCH RBC QN AUTO: 29.3 PG (ref 27–33)
MCHC RBC AUTO-ENTMCNC: 36.5 G/DL (ref 33.7–35.3)
MCV RBC AUTO: 80.3 FL (ref 81.4–97.8)
METHADONE UR QL SCN: NEGATIVE
MONOCYTES # BLD AUTO: 1 K/UL (ref 0–0.85)
MONOCYTES NFR BLD AUTO: 6.7 % (ref 0–13.4)
NEUTROPHILS # BLD AUTO: 11.33 K/UL (ref 1.82–7.42)
NEUTROPHILS NFR BLD: 76.3 % (ref 44–72)
NRBC # BLD AUTO: 0 K/UL
NRBC BLD-RTO: 0 /100 WBC
OPIATES UR QL SCN: POSITIVE
OXYCODONE UR QL SCN: NEGATIVE
PCP UR QL SCN: NEGATIVE
PLATELET # BLD AUTO: 89 K/UL (ref 164–446)
PMV BLD AUTO: 11.5 FL (ref 9–12.9)
POTASSIUM SERPL-SCNC: 4.1 MMOL/L (ref 3.6–5.5)
PROPOXYPH UR QL SCN: NEGATIVE
PROT SERPL-MCNC: 8 G/DL (ref 6–8.2)
PROTHROMBIN TIME: 13.2 SEC (ref 12–14.6)
RBC # BLD AUTO: 6.35 M/UL (ref 4.7–6.1)
SIGNIFICANT IND 70042: NORMAL
SITE SITE: NORMAL
SODIUM SERPL-SCNC: 135 MMOL/L (ref 135–145)
SOURCE SOURCE: NORMAL
TROPONIN I SERPL-MCNC: 0.08 NG/ML (ref 0–0.04)
WBC # BLD AUTO: 14.9 K/UL (ref 4.8–10.8)

## 2018-10-06 PROCEDURE — 85610 PROTHROMBIN TIME: CPT

## 2018-10-06 PROCEDURE — 72131 CT LUMBAR SPINE W/O DYE: CPT

## 2018-10-06 PROCEDURE — 83690 ASSAY OF LIPASE: CPT

## 2018-10-06 PROCEDURE — 36415 COLL VENOUS BLD VENIPUNCTURE: CPT

## 2018-10-06 PROCEDURE — A9270 NON-COVERED ITEM OR SERVICE: HCPCS | Performed by: HOSPITALIST

## 2018-10-06 PROCEDURE — 700111 HCHG RX REV CODE 636 W/ 250 OVERRIDE (IP): Performed by: INTERNAL MEDICINE

## 2018-10-06 PROCEDURE — 700111 HCHG RX REV CODE 636 W/ 250 OVERRIDE (IP)

## 2018-10-06 PROCEDURE — 700105 HCHG RX REV CODE 258

## 2018-10-06 PROCEDURE — 700102 HCHG RX REV CODE 250 W/ 637 OVERRIDE(OP): Performed by: HOSPITALIST

## 2018-10-06 PROCEDURE — 80307 DRUG TEST PRSMV CHEM ANLYZR: CPT

## 2018-10-06 PROCEDURE — 96365 THER/PROPH/DIAG IV INF INIT: CPT

## 2018-10-06 PROCEDURE — 71045 X-RAY EXAM CHEST 1 VIEW: CPT

## 2018-10-06 PROCEDURE — 700101 HCHG RX REV CODE 250: Performed by: INTERNAL MEDICINE

## 2018-10-06 PROCEDURE — 99291 CRITICAL CARE FIRST HOUR: CPT | Mod: 25 | Performed by: INTERNAL MEDICINE

## 2018-10-06 PROCEDURE — 74176 CT ABD & PELVIS W/O CONTRAST: CPT

## 2018-10-06 PROCEDURE — 700105 HCHG RX REV CODE 258: Performed by: EMERGENCY MEDICINE

## 2018-10-06 PROCEDURE — 84484 ASSAY OF TROPONIN QUANT: CPT

## 2018-10-06 PROCEDURE — 85730 THROMBOPLASTIN TIME PARTIAL: CPT

## 2018-10-06 PROCEDURE — 70450 CT HEAD/BRAIN W/O DYE: CPT

## 2018-10-06 PROCEDURE — 83880 ASSAY OF NATRIURETIC PEPTIDE: CPT

## 2018-10-06 PROCEDURE — 83605 ASSAY OF LACTIC ACID: CPT | Mod: 91

## 2018-10-06 PROCEDURE — A9270 NON-COVERED ITEM OR SERVICE: HCPCS | Performed by: INTERNAL MEDICINE

## 2018-10-06 PROCEDURE — 72128 CT CHEST SPINE W/O DYE: CPT

## 2018-10-06 PROCEDURE — 96375 TX/PRO/DX INJ NEW DRUG ADDON: CPT

## 2018-10-06 PROCEDURE — 99406 BEHAV CHNG SMOKING 3-10 MIN: CPT | Mod: 25 | Performed by: INTERNAL MEDICINE

## 2018-10-06 PROCEDURE — 700111 HCHG RX REV CODE 636 W/ 250 OVERRIDE (IP): Performed by: EMERGENCY MEDICINE

## 2018-10-06 PROCEDURE — 87205 SMEAR GRAM STAIN: CPT

## 2018-10-06 PROCEDURE — 700102 HCHG RX REV CODE 250 W/ 637 OVERRIDE(OP): Performed by: INTERNAL MEDICINE

## 2018-10-06 PROCEDURE — 80053 COMPREHEN METABOLIC PANEL: CPT

## 2018-10-06 PROCEDURE — 700105 HCHG RX REV CODE 258: Performed by: INTERNAL MEDICINE

## 2018-10-06 PROCEDURE — 700101 HCHG RX REV CODE 250: Performed by: EMERGENCY MEDICINE

## 2018-10-06 PROCEDURE — 99291 CRITICAL CARE FIRST HOUR: CPT

## 2018-10-06 PROCEDURE — 87070 CULTURE OTHR SPECIMN AEROBIC: CPT

## 2018-10-06 PROCEDURE — 96367 TX/PROPH/DG ADDL SEQ IV INF: CPT

## 2018-10-06 PROCEDURE — 72125 CT NECK SPINE W/O DYE: CPT

## 2018-10-06 PROCEDURE — 85025 COMPLETE CBC W/AUTO DIFF WBC: CPT

## 2018-10-06 PROCEDURE — 770022 HCHG ROOM/CARE - ICU (200)

## 2018-10-06 PROCEDURE — 87040 BLOOD CULTURE FOR BACTERIA: CPT

## 2018-10-06 RX ORDER — FAMOTIDINE 20 MG/1
20 TABLET, FILM COATED ORAL 2 TIMES DAILY
Status: DISCONTINUED | OUTPATIENT
Start: 2018-10-06 | End: 2018-10-09 | Stop reason: HOSPADM

## 2018-10-06 RX ORDER — OXYCODONE HYDROCHLORIDE 5 MG/1
5-10 TABLET ORAL EVERY 4 HOURS PRN
Status: DISCONTINUED | OUTPATIENT
Start: 2018-10-06 | End: 2018-10-09 | Stop reason: HOSPADM

## 2018-10-06 RX ORDER — SODIUM CHLORIDE 9 MG/ML
INJECTION, SOLUTION INTRAVENOUS CONTINUOUS
Status: DISCONTINUED | OUTPATIENT
Start: 2018-10-06 | End: 2018-10-09 | Stop reason: HOSPADM

## 2018-10-06 RX ORDER — SIMVASTATIN 20 MG
10 TABLET ORAL NIGHTLY
Status: DISCONTINUED | OUTPATIENT
Start: 2018-10-06 | End: 2018-10-09 | Stop reason: HOSPADM

## 2018-10-06 RX ORDER — ACETAMINOPHEN 325 MG/1
650 TABLET ORAL EVERY 6 HOURS PRN
Status: DISCONTINUED | OUTPATIENT
Start: 2018-10-06 | End: 2018-10-09 | Stop reason: HOSPADM

## 2018-10-06 RX ORDER — ONDANSETRON 2 MG/ML
4 INJECTION INTRAMUSCULAR; INTRAVENOUS ONCE
Status: COMPLETED | OUTPATIENT
Start: 2018-10-06 | End: 2018-10-06

## 2018-10-06 RX ORDER — SODIUM CHLORIDE 9 MG/ML
30 INJECTION, SOLUTION INTRAVENOUS
Status: DISCONTINUED | OUTPATIENT
Start: 2018-10-06 | End: 2018-10-06

## 2018-10-06 RX ORDER — SODIUM CHLORIDE 9 MG/ML
30 INJECTION, SOLUTION INTRAVENOUS
Status: COMPLETED | OUTPATIENT
Start: 2018-10-06 | End: 2018-10-06

## 2018-10-06 RX ORDER — CYCLOBENZAPRINE HCL 10 MG
5 TABLET ORAL EVERY 8 HOURS PRN
Status: DISCONTINUED | OUTPATIENT
Start: 2018-10-06 | End: 2018-10-09 | Stop reason: HOSPADM

## 2018-10-06 RX ORDER — POLYETHYLENE GLYCOL 3350 17 G/17G
1 POWDER, FOR SOLUTION ORAL
Status: DISCONTINUED | OUTPATIENT
Start: 2018-10-06 | End: 2018-10-08

## 2018-10-06 RX ORDER — SODIUM CHLORIDE 9 MG/ML
500 INJECTION, SOLUTION INTRAVENOUS
Status: DISCONTINUED | OUTPATIENT
Start: 2018-10-06 | End: 2018-10-09 | Stop reason: HOSPADM

## 2018-10-06 RX ORDER — AMOXICILLIN 250 MG
2 CAPSULE ORAL 2 TIMES DAILY
Status: DISCONTINUED | OUTPATIENT
Start: 2018-10-06 | End: 2018-10-08

## 2018-10-06 RX ORDER — ONDANSETRON 2 MG/ML
INJECTION INTRAMUSCULAR; INTRAVENOUS
Status: COMPLETED
Start: 2018-10-06 | End: 2018-10-06

## 2018-10-06 RX ORDER — SODIUM CHLORIDE 9 MG/ML
30 INJECTION, SOLUTION INTRAVENOUS
Status: DISCONTINUED | OUTPATIENT
Start: 2018-10-06 | End: 2018-10-09 | Stop reason: HOSPADM

## 2018-10-06 RX ORDER — ONDANSETRON 2 MG/ML
4 INJECTION INTRAMUSCULAR; INTRAVENOUS EVERY 4 HOURS PRN
Status: DISCONTINUED | OUTPATIENT
Start: 2018-10-06 | End: 2018-10-09 | Stop reason: HOSPADM

## 2018-10-06 RX ORDER — BISACODYL 10 MG
10 SUPPOSITORY, RECTAL RECTAL
Status: DISCONTINUED | OUTPATIENT
Start: 2018-10-06 | End: 2018-10-08

## 2018-10-06 RX ORDER — ONDANSETRON 4 MG/1
4 TABLET, ORALLY DISINTEGRATING ORAL EVERY 4 HOURS PRN
Status: DISCONTINUED | OUTPATIENT
Start: 2018-10-06 | End: 2018-10-09 | Stop reason: HOSPADM

## 2018-10-06 RX ORDER — OMEPRAZOLE 20 MG/1
20 CAPSULE, DELAYED RELEASE ORAL EVERY EVENING
Status: DISCONTINUED | OUTPATIENT
Start: 2018-10-06 | End: 2018-10-09 | Stop reason: HOSPADM

## 2018-10-06 RX ORDER — METOCLOPRAMIDE HYDROCHLORIDE 5 MG/ML
10 INJECTION INTRAMUSCULAR; INTRAVENOUS ONCE
Status: COMPLETED | OUTPATIENT
Start: 2018-10-06 | End: 2018-10-06

## 2018-10-06 RX ORDER — SODIUM CHLORIDE 9 MG/ML
1000 INJECTION, SOLUTION INTRAVENOUS ONCE
Status: COMPLETED | OUTPATIENT
Start: 2018-10-06 | End: 2018-10-06

## 2018-10-06 RX ORDER — PROMETHAZINE HYDROCHLORIDE 25 MG/1
12.5-25 SUPPOSITORY RECTAL EVERY 4 HOURS PRN
Status: DISCONTINUED | OUTPATIENT
Start: 2018-10-06 | End: 2018-10-09 | Stop reason: HOSPADM

## 2018-10-06 RX ORDER — MORPHINE SULFATE 4 MG/ML
2 INJECTION, SOLUTION INTRAMUSCULAR; INTRAVENOUS EVERY 4 HOURS PRN
Status: DISCONTINUED | OUTPATIENT
Start: 2018-10-06 | End: 2018-10-08

## 2018-10-06 RX ORDER — PROMETHAZINE HYDROCHLORIDE 25 MG/1
12.5-25 TABLET ORAL EVERY 4 HOURS PRN
Status: DISCONTINUED | OUTPATIENT
Start: 2018-10-06 | End: 2018-10-09 | Stop reason: HOSPADM

## 2018-10-06 RX ORDER — MORPHINE SULFATE 4 MG/ML
4 INJECTION, SOLUTION INTRAMUSCULAR; INTRAVENOUS ONCE
Status: COMPLETED | OUTPATIENT
Start: 2018-10-06 | End: 2018-10-06

## 2018-10-06 RX ADMIN — ONDANSETRON HYDROCHLORIDE 4 MG: 2 SOLUTION INTRAMUSCULAR; INTRAVENOUS at 12:09

## 2018-10-06 RX ADMIN — OXYCODONE HYDROCHLORIDE 10 MG: 5 TABLET ORAL at 22:06

## 2018-10-06 RX ADMIN — OMEPRAZOLE 20 MG: 20 CAPSULE, DELAYED RELEASE ORAL at 17:18

## 2018-10-06 RX ADMIN — METRONIDAZOLE 500 MG: 500 INJECTION, SOLUTION INTRAVENOUS at 21:00

## 2018-10-06 RX ADMIN — MORPHINE SULFATE 4 MG: 4 INJECTION INTRAVENOUS at 12:08

## 2018-10-06 RX ADMIN — CEFTRIAXONE SODIUM 2 G: 2 INJECTION, POWDER, FOR SOLUTION INTRAMUSCULAR; INTRAVENOUS at 12:10

## 2018-10-06 RX ADMIN — FAMOTIDINE 20 MG: 20 TABLET ORAL at 17:18

## 2018-10-06 RX ADMIN — OXYCODONE HYDROCHLORIDE 10 MG: 5 TABLET ORAL at 17:18

## 2018-10-06 RX ADMIN — FENTANYL CITRATE 50 MCG: 50 INJECTION INTRAMUSCULAR; INTRAVENOUS at 13:33

## 2018-10-06 RX ADMIN — CYCLOBENZAPRINE 5 MG: 10 TABLET, FILM COATED ORAL at 20:30

## 2018-10-06 RX ADMIN — SODIUM CHLORIDE 1000 ML: 9 INJECTION, SOLUTION INTRAVENOUS at 12:09

## 2018-10-06 RX ADMIN — MORPHINE SULFATE 2 MG: 4 INJECTION INTRAVENOUS at 14:43

## 2018-10-06 RX ADMIN — ONDANSETRON 4 MG: 2 INJECTION INTRAMUSCULAR; INTRAVENOUS at 12:09

## 2018-10-06 RX ADMIN — SODIUM CHLORIDE 2736 ML: 9 INJECTION, SOLUTION INTRAVENOUS at 13:02

## 2018-10-06 RX ADMIN — ONDANSETRON 4 MG: 2 INJECTION INTRAMUSCULAR; INTRAVENOUS at 22:05

## 2018-10-06 RX ADMIN — MORPHINE SULFATE 2 MG: 4 INJECTION INTRAVENOUS at 19:02

## 2018-10-06 RX ADMIN — ONDANSETRON 4 MG: 2 INJECTION INTRAMUSCULAR; INTRAVENOUS at 16:27

## 2018-10-06 RX ADMIN — METOCLOPRAMIDE 10 MG: 5 INJECTION, SOLUTION INTRAMUSCULAR; INTRAVENOUS at 13:10

## 2018-10-06 RX ADMIN — METRONIDAZOLE 500 MG: 500 INJECTION, SOLUTION INTRAVENOUS at 13:33

## 2018-10-06 RX ADMIN — SODIUM CHLORIDE: 9 INJECTION, SOLUTION INTRAVENOUS at 17:24

## 2018-10-06 RX ADMIN — SIMVASTATIN 10 MG: 20 TABLET, FILM COATED ORAL at 20:28

## 2018-10-06 ASSESSMENT — PAIN SCALES - GENERAL
PAINLEVEL_OUTOF10: 7
PAINLEVEL_OUTOF10: 6
PAINLEVEL_OUTOF10: 7
PAINLEVEL_OUTOF10: 5
PAINLEVEL_OUTOF10: 3
PAINLEVEL_OUTOF10: 8
PAINLEVEL_OUTOF10: 7
PAINLEVEL_OUTOF10: 8
PAINLEVEL_OUTOF10: 6

## 2018-10-06 ASSESSMENT — ENCOUNTER SYMPTOMS
COUGH: 0
NERVOUS/ANXIOUS: 0
NECK PAIN: 0
NAUSEA: 1
HEARTBURN: 0
VOMITING: 1
BLURRED VISION: 0
FEVER: 0
DEPRESSION: 0
BACK PAIN: 0
HEADACHES: 0
PALPITATIONS: 0
STRIDOR: 0
ABDOMINAL PAIN: 1
SPUTUM PRODUCTION: 0
SEIZURES: 0
LOSS OF CONSCIOUSNESS: 1
CHILLS: 0
EYE REDNESS: 0
ORTHOPNEA: 0
SHORTNESS OF BREATH: 0
FOCAL WEAKNESS: 0
INSOMNIA: 0
MYALGIAS: 0
DIZZINESS: 0
WEIGHT LOSS: 0
DIARRHEA: 0
EYE DISCHARGE: 0
EYE PAIN: 0

## 2018-10-06 ASSESSMENT — COGNITIVE AND FUNCTIONAL STATUS - GENERAL
WALKING IN HOSPITAL ROOM: A LITTLE
SUGGESTED CMS G CODE MODIFIER MOBILITY: CJ
SUGGESTED CMS G CODE MODIFIER DAILY ACTIVITY: CH
MOBILITY SCORE: 22
DAILY ACTIVITIY SCORE: 24
CLIMB 3 TO 5 STEPS WITH RAILING: A LITTLE

## 2018-10-06 ASSESSMENT — LIFESTYLE VARIABLES
ALCOHOL_USE: NO
DO YOU DRINK ALCOHOL: NO
EVER_SMOKED: YES

## 2018-10-06 ASSESSMENT — PATIENT HEALTH QUESTIONNAIRE - PHQ9
SUM OF ALL RESPONSES TO PHQ9 QUESTIONS 1 AND 2: 0
1. LITTLE INTEREST OR PLEASURE IN DOING THINGS: NOT AT ALL
1. LITTLE INTEREST OR PLEASURE IN DOING THINGS: NOT AT ALL
2. FEELING DOWN, DEPRESSED, IRRITABLE, OR HOPELESS: NOT AT ALL
SUM OF ALL RESPONSES TO PHQ9 QUESTIONS 1 AND 2: 0
2. FEELING DOWN, DEPRESSED, IRRITABLE, OR HOPELESS: NOT AT ALL

## 2018-10-06 NOTE — ASSESSMENT & PLAN NOTE
Patient completed course of Eliquis on 7/30/2018  He continues to show mild-moderate pulmonary htn of 38 mm, d-dimer is normal

## 2018-10-06 NOTE — ASSESSMENT & PLAN NOTE
This is severe sepsis with the following associated acute organ dysfunction(s): acute kidney failure.   Related to acute diverticulitis  Sepsis protocol  IV ceftriaxone and metronidazole  Follow cultures

## 2018-10-06 NOTE — ED NOTES
Everton from Lab called with critical result of Lactic Acid at 4.6. Critical lab result read back to Bill.   Dr. Abad notified of critical lab result at 1241.  Critical lab result read back by Dr. Abad.

## 2018-10-06 NOTE — ASSESSMENT & PLAN NOTE
Diagnosed during his recent hospitalization  Continue ceftriaxone and Flagyl  Minimal abdominal symptoms, probably can resume oral therapy at discharge

## 2018-10-06 NOTE — H&P
Hospital Medicine History and Physical      Date of Service  10/6/2018    Chief Complaint  Chief Complaint   Patient presents with   • Syncope   • Fall Less than 10 Feet   • Low Back Pain   • Neck Pain       History of Presenting Illness  Sony is a 43 y.o. male PMH of marijuana use, pulmonary embolism on Eliquis , recently diagnosed diverticulitis at [facility] who presents with syncope episode earlier today while he was in the shower.  The patient did not remember the event clearly.  He was discharged from [facility] last week with oral antibiotic but he was not able to take them due to nausea and vomiting.  In the ER he was found to have sepsis with lactic acidosis will lactic acid above 4 related to acute diverticulitis.  CT scan of the head was done and it was negative as well as CT scan of the spine.  Sepsis protocol was initiated and IV antibiotic was given.  He will be admitted to ICU in critical condition.    Primary Care Physician  Andrea Machado P.A.-C.      Code Status  Full code    Review of Systems  Review of Systems   Constitutional: Negative for chills, fever and weight loss.   HENT: Negative for congestion and nosebleeds.    Eyes: Negative for blurred vision, pain, discharge and redness.   Respiratory: Negative for cough, sputum production, shortness of breath and stridor.    Cardiovascular: Negative for chest pain, palpitations and orthopnea.   Gastrointestinal: Positive for abdominal pain, nausea and vomiting. Negative for diarrhea and heartburn.   Genitourinary: Negative for dysuria, frequency and urgency.   Musculoskeletal: Negative for back pain, myalgias and neck pain.   Skin: Negative for itching and rash.   Neurological: Positive for loss of consciousness. Negative for dizziness, focal weakness, seizures and headaches.   Psychiatric/Behavioral: Negative for depression. The patient is not nervous/anxious and does not have insomnia.      Please see HPI, all other systems  were reviewed and are negative (AMA/CMS criteria)     Past Medical History  Past Medical History:   Diagnosis Date   • Bronchitis    • Chickenpox    • GERD (gastroesophageal reflux disease)    • IBS (irritable bowel syndrome)    • Indigestion    • Influenza    • Pneumonia    • Pulmonary embolism (HCC)    • Renal disorder     Acute Renal Failure from dehydration in the past   • Sleep apnea        Surgical History  Past Surgical History:   Procedure Laterality Date   • KNEE REPLACEMENT, TOTAL Left 10/2017   • LENORA BY LAPAROSCOPY      Cholecystectomy, Laparoscopic   • OTHER ORTHOPEDIC SURGERY     • TONSILLECTOMY     • TONSILLECTOMY         Medications  No current facility-administered medications on file prior to encounter.      Current Outpatient Prescriptions on File Prior to Encounter   Medication Sig Dispense Refill   • promethazine (PHENERGAN) 25 MG Tab Take 1 Tab by mouth every 6 hours as needed for Nausea/Vomiting. 30 Tab 0   • amoxicillin-clavulanate (AUGMENTIN) 875-125 MG Tab Take 1 Tab by mouth 2 times a day for 5 days. 10 Tab 0   • raNITidine (ZANTAC) 150 MG Tab TAKE ONE TABLET BY MOUTH TWICE DAILY 60 Tab 3   • apixaban (ELIQUIS) 5mg Tab Take 1 Tab by mouth 2 Times a Day. 60 Tab 3   • pantoprazole (PROTONIX) 40 MG Tablet Delayed Response Take 40 mg by mouth every evening.     • amitriptyline (ELAVIL) 100 MG Tab Take 100 mg by mouth every evening.     • simvastatin (ZOCOR) 10 MG Tab Take 10 mg by mouth every evening.       Family History  Family History   Problem Relation Age of Onset   • Other Mother         Lupus   • Sleep Apnea Mother    • Heart Disease Father    • Hypertension Father    • Sleep Apnea Father    • Heart Disease Paternal Uncle    • Heart Disease Paternal Grandfather          Social History  Social History   Substance Use Topics   • Smoking status: Current Every Day Smoker     Packs/day: 0.50     Years: 22.00     Types: Cigarettes   • Smokeless tobacco: Never Used   • Alcohol use No        Allergies  Allergies   Allergen Reactions   • Fish Anaphylaxis     [place] and tuna   • Barley Grass Anaphylaxis   • Green Beans Shortness of Breath and Nausea   • Nicoderm [Nicotine] Rash, Itching and Swelling     Blisters noted to patch site   • Turkey Shortness of Breath and Nausea        Physical Exam  Laboratory   Hemodynamics  Temp (24hrs), Av.7 °C (96.3 °F), Min:35.7 °C (96.3 °F), Max:35.7 °C (96.3 °F)   Temperature: (!) 35.7 °C (96.3 °F)  Pulse  Av  Min: 95  Max: 116 Heart Rate (Monitored): 100  Blood Pressure: 150/92 (EMS vitals ), NIBP: 126/104      Respiratory      Respiration: (!) 22, Pulse Oximetry: 98 %             Physical Exam   Constitutional: He is oriented to person, place, and time. No distress.   HENT:   Head: Normocephalic and atraumatic.   Mouth/Throat: Oropharynx is clear and moist.   Eyes: Pupils are equal, round, and reactive to light. Conjunctivae and EOM are normal.   Neck: Normal range of motion. Neck supple. No tracheal deviation present. No thyromegaly present.   Cardiovascular: Regular rhythm.    No murmur heard.  Tachycardic   Pulmonary/Chest: Effort normal and breath sounds normal. No respiratory distress. He has no wheezes.   Abdominal: Soft. Bowel sounds are normal. He exhibits no distension. There is tenderness. There is no rebound.   Musculoskeletal: He exhibits no edema or tenderness.   Neurological: He is alert and oriented to person, place, and time. No cranial nerve deficit.   Skin: Skin is warm and dry. He is not diaphoretic. No erythema.   Psychiatric: He has a normal mood and affect. His behavior is normal. Thought content normal.       Recent Labs      10/06/18   1142   WBC  14.9*   RBC  6.35*   HEMOGLOBIN  18.3*   HEMATOCRIT  51.4   MCV  80.3*   MCH  29.3   MCHC  36.5*   RDW  35.1*   PLATELETCT  89*   MPV  11.5     Recent Labs      10/06/18   1142   SODIUM  135   POTASSIUM  4.1   CHLORIDE  95*   CO2  21   GLUCOSE  157*   BUN  33*   CREATININE  1.96*    CALCIUM  11.4*     Recent Labs      10/06/18   1142   ALTSGPT  35   ASTSGOT  29   ALKPHOSPHAT  107*   TBILIRUBIN  0.9   LIPASE  23   GLUCOSE  157*     Recent Labs      10/06/18   1142   APTT  27.4   INR  0.99     Recent Labs      10/06/18   1142   BNPBTYPENAT  30         Lab Results   Component Value Date    TROPONINI 0.08 (H) 10/06/2018       Imaging  CT-CSPINE WITHOUT PLUS RECONS   Final Result      CT of the cervical spine without contrast within normal limits.      CT-HEAD W/O   Final Result         1. No acute intracranial abnormality. No evidence of acute intracranial hemorrhage or mass lesion.               DX-CHEST-PORTABLE (1 VIEW)   Final Result      No acute cardiac or pulmonary abnormalities are identified.      CT-TSPINE W/O PLUS RECONS    (Results Pending)   CT-LSPINE W/O PLUS RECONS    (Results Pending)   CT-CHEST,ABDOMEN,PELVIS W/O    (Results Pending)          Assessment/Plan     I anticipate this patient will require at least two midnights for appropriate medical management, necessitating inpatient admission.    Sepsis (HCC)- (present on admission)   Assessment & Plan    This is severe sepsis with the following associated acute organ dysfunction(s): acute kidney failure.   Related to acute diverticulitis  Sepsis protocol  IV ceftriaxone and metronidazole  Follow cultures        Elevated troponin- (present on admission)   Assessment & Plan    Likely demand ischemia        Syncope   Assessment & Plan    Vasovagal versus arrhythmia versus metabolic  CT scan of the head negative  Monitor on telemetry closely  Check echocardiogram        Hypercalcemia- (present on admission)   Assessment & Plan    Likely related to sepsis/dehydration  Follow CMP        Lactic acidosis- (present on admission)   Assessment & Plan    Lactic acid 4.5  Related to sepsis        Acute diverticulitis- (present on admission)   Assessment & Plan    On IV ceftriaxone and metronidazole  Plan as above          Acute kidney injury  (HCC)- (present on admission)   Assessment & Plan    Related to sepsis  On IV fluid  Avoid nephrotoxic drugs        High anion gap metabolic acidosis- (present on admission)   Assessment & Plan    Related to lactic acidosis/KLEBER  Plan as above        Tobacco abuse- (present on admission)   Assessment & Plan    Spent 3 minutes on smoking cessation education        History of pulmonary embolism- (present on admission)   Assessment & Plan    Continue Eliquis        Leukocytosis- (present on admission)   Assessment & Plan    Related to sepsis/diverticulitis          Critical care time spent 40 minutes without  overlap, excluding procedure time.    Prophylaxis:  apixaban

## 2018-10-06 NOTE — ED TRIAGE NOTES
"Pt was home today and had a syncope in the bathtub. +LOC, pt complaining of pain in low back and neck. Pt states \"he doesn't remember what\".   "

## 2018-10-06 NOTE — ASSESSMENT & PLAN NOTE
Suspect vasovagal in the setting of dehydration and vasodilation during hot shower  -He seems hemodynamically stable -we will allow him to shower here and see if there are similar symptoms

## 2018-10-06 NOTE — ED PROVIDER NOTES
ED Provider Note    CHIEF COMPLAINT  Chief Complaint   Patient presents with   • Syncope   • Fall Less than 10 Feet   • Low Back Pain   • Neck Pain       HPI  Jerome Cardoza Jr. is a 43 y.o. male who presents to the emergency department after he passed out in the shower today at home.  The patient was recently admitted to the hospital and states that he was diagnosed with colitis he presented with symptoms of nausea vomiting and diarrhea and had a CT scan and was admitted to the hospital and was treated and then was discharged on October 1 with prescriptions for antibiotics but the patient has not been taking the antibiotics stating that he has too much nausea and vomiting to take the medications.  Unfortunately he continues to smoke marijuana.  The patient has a history of marijuana abuse and cyclic vomiting syndrome.  In the shower today he felt extremely lightheaded he passed out and now has pain in the neck upper and lower back.    REVIEW OF SYSTEMS no fever no shortness of breath no numbness tingling or weakness in the extremities.  All other systems negative    PAST MEDICAL HISTORY  Past Medical History:   Diagnosis Date   • Bronchitis    • Chickenpox    • GERD (gastroesophageal reflux disease)    • IBS (irritable bowel syndrome)    • Indigestion    • Influenza    • Pneumonia    • Pulmonary embolism (HCC)    • Renal disorder     Acute Renal Failure from dehydration in the past   • Sleep apnea        FAMILY HISTORY  Family History   Problem Relation Age of Onset   • Other Mother         Lupus   • Sleep Apnea Mother    • Heart Disease Father    • Hypertension Father    • Sleep Apnea Father    • Heart Disease Paternal Uncle    • Heart Disease Paternal Grandfather        SOCIAL HISTORY  Social History     Social History   • Marital status:      Spouse name: N/A   • Number of children: N/A   • Years of education: N/A     Social History Main Topics   • Smoking status: Current Every Day Smoker      Packs/day: 0.50     Years: 22.00     Types: Cigarettes   • Smokeless tobacco: Never Used   • Alcohol use No   • Drug use: Yes     Types: Inhaled      Comment: marijuana occ   • Sexual activity: Yes     Partners: Female     Other Topics Concern   • Not on file     Social History Narrative   • No narrative on file       SURGICAL HISTORY  Past Surgical History:   Procedure Laterality Date   • KNEE REPLACEMENT, TOTAL Left 10/2017   • LENORA BY LAPAROSCOPY      Cholecystectomy, Laparoscopic   • OTHER ORTHOPEDIC SURGERY     • TONSILLECTOMY     • TONSILLECTOMY         CURRENT MEDICATIONS  Home Medications     Reviewed by Sonja Ledbetter, Pharmacy Intern (Pharmacy Intern) on 10/06/18 at 1351  Med List Status: Complete   Medication Last Dose Status   amitriptyline (ELAVIL) 100 MG Tab 10/1/2018 Active   amoxicillin-clavulanate (AUGMENTIN) 875-125 MG Tab 10/2/2018 Active   pantoprazole (PROTONIX) 40 MG Tablet Delayed Response 10/1/2018 Active   promethazine (PHENERGAN) 25 MG Tab PRN Active   raNITidine (ZANTAC) 150 MG Tab 10/1/2018 Active   simvastatin (ZOCOR) 10 MG Tab 10/1/2018 Active                ALLERGIES  Allergies   Allergen Reactions   • Fish Anaphylaxis     [place] and tuna   • Barley Grass Anaphylaxis   • Green Beans Shortness of Breath and Nausea   • Nicoderm [Nicotine] Rash, Itching and Swelling     Blisters noted to patch site   • Turkey Shortness of Breath and Nausea       PHYSICAL EXAM  VITAL SIGNS: /92 Comment: EMS vitals   Pulse (!) 102   Temp 37.2 °C (99 °F)   Resp 16   Wt 84.8 kg (186 lb 15.2 oz)   SpO2 97%   BMI 29.28 kg/m²    Oxygen saturation is interpreted as adequate  Constitutional: Awake and ill-appearing individual who seems very uncomfortable  HENT: I do not see any contusions or marks on the head mucous membranes are extremely dry  Eyes: No erythema or discharge or jaundice  Neck: Trachea midline no JVD the patient arrived in a cervical collar  Cardiovascular: Regular  tachycardia  Lungs: Clear and equal bilaterally  Abdomen/Back: Soft diffusely mildly tender no rebound guarding or peritoneal findings he has tenderness in the lower thoracic and lumbar areas  Skin: Diaphoretic  Musculoskeletal: No acute bony deformity  Neurologic: Awake verbal moving all extremities    CHART REVIEW  I reviewed the patient's discharge summary from October 1, 2018 he was admitted with abdominal pain nausea vomiting diarrhea and marijuana use his CT scan showed colitis and he was treated and discharged with prescriptions for antibiotics as noted above.    Laboratory  CBC shows an elevated white blood cell count of 14.9 with an elevated hemoglobin of 18.3 lactic acid level is elevated at 4.6 troponin is slightly elevated at 0.08 BMP is 30 INR 0.99 basic metabolic panel shows an elevated creatinine of 1.96 and blood sugar of 157    EKG interpretation  Twelve-lead EKG shows sinus tachycardia 116 bpm there is no pathologic ST elevation or depression or ectopy DE interval is 124 ms QTc interval is 495 ms findings are similar to cardiogram from May 16, 2018    Radiology  CT-CHEST,ABDOMEN,PELVIS W/O   Final Result      No evidence of thoracic, abdominal or pelvic injury on noncontrast CT scan.      CT-TSPINE W/O PLUS RECONS   Final Result         1. No acute fracture or malalignment appreciated in the thoracic spine         CT-LSPINE W/O PLUS RECONS   Final Result      CT of the lumbar spine without contrast within normal limits.      CT-CSPINE WITHOUT PLUS RECONS   Final Result      CT of the cervical spine without contrast within normal limits.      CT-HEAD W/O   Final Result         1. No acute intracranial abnormality. No evidence of acute intracranial hemorrhage or mass lesion.               DX-CHEST-PORTABLE (1 VIEW)   Final Result      No acute cardiac or pulmonary abnormalities are identified.      EC-ECHOCARDIOGRAM COMPLETE W/O CONT    (Results Pending)     MEDICAL DECISION MAKING and  DISPOSITION  In the emergency department peripheral IVs were established and the patient was placed on a cardiac monitor and intravenous fluids were ordered for sepsis and reevaluation shows that the patient has a stable blood pressure and improving tachycardia.  The patient cannot tolerate oral intake due to abdominal pain and history of trauma and recent history of colitis therefore oral fluids were not administered.  The patient was given intravenous morphine and several doses of Zofran and then also intravenous Reglan to control severe nausea.  He was started on broad-spectrum intravenous antibiotics.  Initially I had ordered CT scans with intravenous contrast but contrast could not be administered because of acute renal failure therefore the studies are limited.  I have reviewed all the above information with the patient and his wife and I reviewed the case with the hospitalist.  The patient is profoundly ill and will be admitted to the ICU for further evaluation and treatment    IMPRESSION  1.  Syncopal episode  2.  Sepsis  3.  Elevated troponin  4.  Acute renal failure  5.  Dehydration  6.  Back pain after a fall  7.  Critical care time with this patient is 35 minutes         Electronically signed by: Tiago Abad, 10/6/2018 4:05 PM

## 2018-10-06 NOTE — ED NOTES
Called [facility] ICU to let nurse know bed and pt is ready for transfer to unit. Pt resting comfortably after medication administration

## 2018-10-06 NOTE — ED NOTES
Med rec complete per pt at bedside   Allergies reviewed  Pt started a 5 day course of Augmentin on 10/01/18 and states he could only take 3 pills because of his nausea so he did not finish

## 2018-10-07 ENCOUNTER — APPOINTMENT (OUTPATIENT)
Dept: CARDIOLOGY | Facility: MEDICAL CENTER | Age: 43
DRG: 641 | End: 2018-10-07
Attending: INTERNAL MEDICINE
Payer: COMMERCIAL

## 2018-10-07 PROBLEM — E87.6 HYPOKALEMIA: Status: ACTIVE | Noted: 2018-10-07

## 2018-10-07 PROBLEM — A41.9 SEPSIS (HCC): Status: RESOLVED | Noted: 2018-10-06 | Resolved: 2018-10-07

## 2018-10-07 PROBLEM — E87.20 LACTIC ACIDOSIS: Status: RESOLVED | Noted: 2018-10-06 | Resolved: 2018-10-07

## 2018-10-07 LAB
ALBUMIN SERPL BCP-MCNC: 3.7 G/DL (ref 3.2–4.9)
ALBUMIN/GLOB SERPL: 1.9 G/DL
ALP SERPL-CCNC: 82 U/L (ref 30–99)
ALT SERPL-CCNC: 70 U/L (ref 2–50)
ANION GAP SERPL CALC-SCNC: 7 MMOL/L (ref 0–11.9)
AST SERPL-CCNC: 59 U/L (ref 12–45)
BILIRUB SERPL-MCNC: 0.9 MG/DL (ref 0.1–1.5)
BUN SERPL-MCNC: 20 MG/DL (ref 8–22)
CALCIUM SERPL-MCNC: 8.8 MG/DL (ref 8.5–10.5)
CHLORIDE SERPL-SCNC: 100 MMOL/L (ref 96–112)
CO2 SERPL-SCNC: 29 MMOL/L (ref 20–33)
CREAT SERPL-MCNC: 1.2 MG/DL (ref 0.5–1.4)
ERYTHROCYTE [DISTWIDTH] IN BLOOD BY AUTOMATED COUNT: 38.6 FL (ref 35.9–50)
GLOBULIN SER CALC-MCNC: 1.9 G/DL (ref 1.9–3.5)
GLUCOSE SERPL-MCNC: 93 MG/DL (ref 65–99)
HCT VFR BLD AUTO: 40.6 % (ref 42–52)
HGB BLD-MCNC: 14.3 G/DL (ref 14–18)
LACTATE BLD-SCNC: 1.3 MMOL/L (ref 0.5–2)
LV EJECT FRACT  99904: 70
LV EJECT FRACT MOD 2C 99903: 71.29
LV EJECT FRACT MOD 4C 99902: 77.96
LV EJECT FRACT MOD BP 99901: 76.07
MCH RBC QN AUTO: 29.4 PG (ref 27–33)
MCHC RBC AUTO-ENTMCNC: 35.2 G/DL (ref 33.7–35.3)
MCV RBC AUTO: 83.5 FL (ref 81.4–97.8)
PLATELET # BLD AUTO: 140 K/UL (ref 164–446)
PMV BLD AUTO: 10.4 FL (ref 9–12.9)
POTASSIUM SERPL-SCNC: 3.4 MMOL/L (ref 3.6–5.5)
PROT SERPL-MCNC: 5.6 G/DL (ref 6–8.2)
RBC # BLD AUTO: 4.86 M/UL (ref 4.7–6.1)
SODIUM SERPL-SCNC: 136 MMOL/L (ref 135–145)
TROPONIN I SERPL-MCNC: <0.01 NG/ML (ref 0–0.04)
WBC # BLD AUTO: 8.6 K/UL (ref 4.8–10.8)

## 2018-10-07 PROCEDURE — 700101 HCHG RX REV CODE 250: Performed by: INTERNAL MEDICINE

## 2018-10-07 PROCEDURE — 700105 HCHG RX REV CODE 258: Performed by: INTERNAL MEDICINE

## 2018-10-07 PROCEDURE — A9270 NON-COVERED ITEM OR SERVICE: HCPCS | Performed by: INTERNAL MEDICINE

## 2018-10-07 PROCEDURE — 84484 ASSAY OF TROPONIN QUANT: CPT

## 2018-10-07 PROCEDURE — 83605 ASSAY OF LACTIC ACID: CPT

## 2018-10-07 PROCEDURE — 36415 COLL VENOUS BLD VENIPUNCTURE: CPT

## 2018-10-07 PROCEDURE — 700111 HCHG RX REV CODE 636 W/ 250 OVERRIDE (IP): Performed by: INTERNAL MEDICINE

## 2018-10-07 PROCEDURE — A9270 NON-COVERED ITEM OR SERVICE: HCPCS | Performed by: HOSPITALIST

## 2018-10-07 PROCEDURE — 700102 HCHG RX REV CODE 250 W/ 637 OVERRIDE(OP): Performed by: INTERNAL MEDICINE

## 2018-10-07 PROCEDURE — 700111 HCHG RX REV CODE 636 W/ 250 OVERRIDE (IP): Performed by: HOSPITALIST

## 2018-10-07 PROCEDURE — 700102 HCHG RX REV CODE 250 W/ 637 OVERRIDE(OP): Performed by: HOSPITALIST

## 2018-10-07 PROCEDURE — 85027 COMPLETE CBC AUTOMATED: CPT

## 2018-10-07 PROCEDURE — 93306 TTE W/DOPPLER COMPLETE: CPT

## 2018-10-07 PROCEDURE — 93306 TTE W/DOPPLER COMPLETE: CPT | Mod: 26 | Performed by: INTERNAL MEDICINE

## 2018-10-07 PROCEDURE — 99233 SBSQ HOSP IP/OBS HIGH 50: CPT | Performed by: HOSPITALIST

## 2018-10-07 PROCEDURE — 700105 HCHG RX REV CODE 258: Performed by: HOSPITALIST

## 2018-10-07 PROCEDURE — 700101 HCHG RX REV CODE 250: Performed by: HOSPITALIST

## 2018-10-07 PROCEDURE — 80053 COMPREHEN METABOLIC PANEL: CPT

## 2018-10-07 PROCEDURE — 770020 HCHG ROOM/CARE - TELE (206)

## 2018-10-07 RX ORDER — ASPIRIN 81 MG/1
81 TABLET, CHEWABLE ORAL DAILY
Status: DISCONTINUED | OUTPATIENT
Start: 2018-10-07 | End: 2018-10-09 | Stop reason: HOSPADM

## 2018-10-07 RX ORDER — ACYCLOVIR 800 MG/1
400 TABLET ORAL 3 TIMES DAILY
Status: DISCONTINUED | OUTPATIENT
Start: 2018-10-07 | End: 2018-10-09 | Stop reason: HOSPADM

## 2018-10-07 RX ORDER — POTASSIUM CHLORIDE 20 MEQ/1
40 TABLET, EXTENDED RELEASE ORAL ONCE
Status: COMPLETED | OUTPATIENT
Start: 2018-10-07 | End: 2018-10-07

## 2018-10-07 RX ORDER — METRONIDAZOLE 500 MG/1
500 TABLET ORAL EVERY 8 HOURS
Status: DISCONTINUED | OUTPATIENT
Start: 2018-10-07 | End: 2018-10-09 | Stop reason: HOSPADM

## 2018-10-07 RX ADMIN — OXYCODONE HYDROCHLORIDE 10 MG: 5 TABLET ORAL at 22:24

## 2018-10-07 RX ADMIN — MORPHINE SULFATE 2 MG: 4 INJECTION INTRAVENOUS at 20:40

## 2018-10-07 RX ADMIN — OXYCODONE HYDROCHLORIDE 10 MG: 5 TABLET ORAL at 10:56

## 2018-10-07 RX ADMIN — OMEPRAZOLE 20 MG: 20 CAPSULE, DELAYED RELEASE ORAL at 17:39

## 2018-10-07 RX ADMIN — SODIUM CHLORIDE: 9 INJECTION, SOLUTION INTRAVENOUS at 21:00

## 2018-10-07 RX ADMIN — ONDANSETRON 4 MG: 4 TABLET, ORALLY DISINTEGRATING ORAL at 06:11

## 2018-10-07 RX ADMIN — ASPIRIN 81 MG: 81 TABLET, CHEWABLE ORAL at 17:39

## 2018-10-07 RX ADMIN — METRONIDAZOLE 500 MG: 500 TABLET ORAL at 21:00

## 2018-10-07 RX ADMIN — SODIUM CHLORIDE: 9 INJECTION, SOLUTION INTRAVENOUS at 10:47

## 2018-10-07 RX ADMIN — FAMOTIDINE 20 MG: 20 TABLET ORAL at 17:39

## 2018-10-07 RX ADMIN — OXYCODONE HYDROCHLORIDE 10 MG: 5 TABLET ORAL at 06:13

## 2018-10-07 RX ADMIN — ACYCLOVIR 400 MG: 800 TABLET ORAL at 17:40

## 2018-10-07 RX ADMIN — OXYCODONE HYDROCHLORIDE 10 MG: 5 TABLET ORAL at 17:39

## 2018-10-07 RX ADMIN — CEFTRIAXONE SODIUM 2 G: 2 INJECTION, POWDER, FOR SOLUTION INTRAMUSCULAR; INTRAVENOUS at 06:13

## 2018-10-07 RX ADMIN — MORPHINE SULFATE 2 MG: 4 INJECTION INTRAVENOUS at 13:43

## 2018-10-07 RX ADMIN — PROCHLORPERAZINE EDISYLATE 10 MG: 5 INJECTION INTRAMUSCULAR; INTRAVENOUS at 06:31

## 2018-10-07 RX ADMIN — ENOXAPARIN SODIUM 40 MG: 100 INJECTION SUBCUTANEOUS at 10:12

## 2018-10-07 RX ADMIN — SODIUM CHLORIDE 1000 ML: 9 INJECTION, SOLUTION INTRAVENOUS at 01:53

## 2018-10-07 RX ADMIN — METRONIDAZOLE 500 MG: 500 INJECTION, SOLUTION INTRAVENOUS at 13:37

## 2018-10-07 RX ADMIN — POTASSIUM CHLORIDE 40 MEQ: 1500 TABLET, EXTENDED RELEASE ORAL at 17:39

## 2018-10-07 RX ADMIN — OXYCODONE HYDROCHLORIDE 10 MG: 5 TABLET ORAL at 02:07

## 2018-10-07 RX ADMIN — METRONIDAZOLE 500 MG: 500 INJECTION, SOLUTION INTRAVENOUS at 06:13

## 2018-10-07 RX ADMIN — SIMVASTATIN 10 MG: 20 TABLET, FILM COATED ORAL at 21:00

## 2018-10-07 RX ADMIN — FAMOTIDINE 20 MG: 20 TABLET ORAL at 06:13

## 2018-10-07 ASSESSMENT — ENCOUNTER SYMPTOMS
LOSS OF CONSCIOUSNESS: 0
WEAKNESS: 0
COUGH: 0
SPEECH CHANGE: 0
SEIZURES: 0
STRIDOR: 0
ORTHOPNEA: 0
DIARRHEA: 0
HEADACHES: 0
FALLS: 0
FLANK PAIN: 0
NAUSEA: 0
EYE DISCHARGE: 0
VOMITING: 0
HALLUCINATIONS: 0
EYE REDNESS: 0
NERVOUS/ANXIOUS: 0
MEMORY LOSS: 0
BLOOD IN STOOL: 0
SHORTNESS OF BREATH: 0
PALPITATIONS: 0
EYE PAIN: 0
FEVER: 0
ABDOMINAL PAIN: 1
NECK PAIN: 0
BACK PAIN: 0
SPUTUM PRODUCTION: 0
FOCAL WEAKNESS: 0
HEMOPTYSIS: 0
BLURRED VISION: 0

## 2018-10-07 ASSESSMENT — PAIN SCALES - GENERAL
PAINLEVEL_OUTOF10: 7
PAINLEVEL_OUTOF10: 3
PAINLEVEL_OUTOF10: 6
PAINLEVEL_OUTOF10: 7
PAINLEVEL_OUTOF10: 3
PAINLEVEL_OUTOF10: 6
PAINLEVEL_OUTOF10: 6
PAINLEVEL_OUTOF10: 4
PAINLEVEL_OUTOF10: 2
PAINLEVEL_OUTOF10: 7
PAINLEVEL_OUTOF10: 7
PAINLEVEL_OUTOF10: 1

## 2018-10-07 ASSESSMENT — PATIENT HEALTH QUESTIONNAIRE - PHQ9
2. FEELING DOWN, DEPRESSED, IRRITABLE, OR HOPELESS: NOT AT ALL
1. LITTLE INTEREST OR PLEASURE IN DOING THINGS: NOT AT ALL
SUM OF ALL RESPONSES TO PHQ9 QUESTIONS 1 AND 2: 0

## 2018-10-07 ASSESSMENT — LIFESTYLE VARIABLES: SUBSTANCE_ABUSE: 0

## 2018-10-07 NOTE — PROGRESS NOTES
Received report from SRUTHI Moreno. Pt transferred to tele, cardiac monitoring initiated, pt in SR, VSS. Pt alert and oriented x4, wife at bedside. Gait is unsteady at this time, pt states he feels weak. All fall precautions in place.

## 2018-10-07 NOTE — PROGRESS NOTES
Patient arrived to T622 from ER Red 6 at 1420. Patient AOx4, complaining of neck/lower back/abdominal pain after a fall at home. Medicated per MAR. Received a total of 3L NS bolus in ER.

## 2018-10-07 NOTE — CARE PLAN
Problem: Safety  Goal: Will remain free from injury  Outcome: PROGRESSING AS EXPECTED    Intervention: Provide assistance with mobility  Standby assist. Calls appropriately for assistance.   Intervention: Educate patient and significant other/support system about adaptive mobility strategies and safe transfers  Patient educated to call for assistance prior to getting up.     Goal: Will remain free from falls  Outcome: PROGRESSING AS EXPECTED  No falls this visit. Patient had a recent fall at home. Fall precautions in place, bed alarm on, call light within reach.

## 2018-10-07 NOTE — PROGRESS NOTES
2 RN skin check completed with Kassy TILLMAN RN. Skin intact, left knee scar from knee replacement.

## 2018-10-07 NOTE — PROGRESS NOTES
Dr. Burch paged for muscle relaxant. Flexeril prescribed. Pt has allergy to fish. Consulted Leonor, pharmacist due to cross- sensitivity  due to allergies. Pharmacist stated opt can take medication unless has soybean allergy, which patient refused. Also, pt has been prescribed this medication before with no adverse effects

## 2018-10-07 NOTE — PROGRESS NOTES
[facility] Hospitalist Progress Note    Date of Service: 10/7/2018    Chief Complaint  43 y.o. male admitted 10/6/2018 with syncope, concern for possible sepsis recently diagnosed with diverticulitis and discharged home on antibiotics he reports that he was unable to keep his antibiotics secondary to nausea and vomiting.  He has a previous history of PE following knee surgery he completed his course of anticoagulation and has been off any anticoagulation since July    Interval Problem Update  Denies chest pain or dyspnea this morning.  Tolerating diet.  Lactic acidosis resolved  Leukocytosis improved  Consultants/Specialty  None    Disposition  Transfer telemetry        Review of Systems   Constitutional: Negative for fever and malaise/fatigue.   HENT: Negative for congestion, ear discharge and nosebleeds.    Eyes: Negative for blurred vision, pain, discharge and redness.   Respiratory: Negative for cough, hemoptysis, sputum production, shortness of breath and stridor.    Cardiovascular: Negative for chest pain, palpitations, orthopnea and leg swelling.   Gastrointestinal: Positive for abdominal pain (Mild). Negative for blood in stool, diarrhea, melena, nausea (Resolved) and vomiting.   Genitourinary: Negative for dysuria, flank pain and hematuria.   Musculoskeletal: Negative for back pain, falls, joint pain and neck pain.   Skin: Negative.    Neurological: Negative for speech change, focal weakness, seizures, loss of consciousness, weakness and headaches.   Psychiatric/Behavioral: Negative for hallucinations, memory loss and substance abuse. The patient is not nervous/anxious.    All other systems reviewed and are negative.     Physical Exam  Laboratory/Imaging   Hemodynamics  Temp (24hrs), Av.8 °C (98.3 °F), Min:35.7 °C (96.3 °F), Max:37.3 °C (99.2 °F)   Temperature: 36.6 °C (97.9 °F)  Pulse  Av.6  Min: 68  Max: 116 Heart Rate (Monitored): 73  Blood Pressure: 150/92 (EMS vitals ), NIBP: 116/79       Respiratory      Respiration: 16, Pulse Oximetry: 97 %        RUL Breath Sounds: Clear, RML Breath Sounds: Clear, RLL Breath Sounds: Clear;Diminished, JEFFERY Breath Sounds: Clear, LLL Breath Sounds: Clear;Diminished    Fluids    Intake/Output Summary (Last 24 hours) at 10/07/18 0930  Last data filed at 10/07/18 0800   Gross per 24 hour   Intake             4523 ml   Output             1025 ml   Net             3498 ml       Nutrition  Orders Placed This Encounter   Procedures   • Diet Order Clear Liquid     Standing Status:   Standing     Number of Occurrences:   1     Order Specific Question:   Diet:     Answer:   Clear Liquid [10]     Physical Exam   Constitutional: He is oriented to person, place, and time. He appears well-developed and well-nourished.   HENT:   Head: Normocephalic and atraumatic.   Right Ear: External ear normal.   Left Ear: External ear normal.   Mouth/Throat: No oropharyngeal exudate.   Eyes: Conjunctivae are normal. Right eye exhibits no discharge. Left eye exhibits no discharge. No scleral icterus.   Neck: Neck supple. No JVD present. No tracheal deviation present.   Cardiovascular: Normal rate and regular rhythm.  Exam reveals no gallop and no friction rub.    No murmur heard.  Pulmonary/Chest: Effort normal and breath sounds normal. No stridor. No respiratory distress. He has no wheezes. He has no rales. He exhibits no tenderness.   Abdominal: Soft. Bowel sounds are normal. He exhibits no distension and no mass. There is no tenderness. There is no rebound and no guarding.   Musculoskeletal: He exhibits no edema or tenderness.   Neurological: He is alert and oriented to person, place, and time. No cranial nerve deficit. He exhibits normal muscle tone.   Skin: Skin is warm and dry. He is not diaphoretic. No cyanosis. Nails show no clubbing.   Psychiatric: He has a normal mood and affect. His behavior is normal. Thought content normal.   Nursing note and vitals reviewed.      Recent Labs       10/06/18   1142  10/07/18   0221   WBC  14.9*  8.6   RBC  6.35*  4.86   HEMOGLOBIN  18.3*  14.3   HEMATOCRIT  51.4  40.6*   MCV  80.3*  83.5   MCH  29.3  29.4   MCHC  36.5*  35.2   RDW  35.1*  38.6   PLATELETCT  89*  140*   MPV  11.5  10.4     Recent Labs      10/06/18   1142  10/07/18   0221   SODIUM  135  136   POTASSIUM  4.1  3.4*   CHLORIDE  95*  100   CO2  21  29   GLUCOSE  157*  93   BUN  33*  20   CREATININE  1.96*  1.20   CALCIUM  11.4*  8.8     Recent Labs      10/06/18   1142   APTT  27.4   INR  0.99     Recent Labs      10/06/18   1142   BNPBTYPENAT  30              Assessment/Plan     Hypokalemia   Assessment & Plan    Replete and monitor         Elevated troponin- (present on admission)   Assessment & Plan    Recheck troponin and check echocardiogram  Suspect demand ischemia        Syncope   Assessment & Plan    Suspect vasovagal in the setting of dehydration and vasodilation during hot shower  Continue telemetry monitoring  Follow-up on echocardiogram and lower extremity duplex        Hypercalcemia- (present on admission)   Assessment & Plan    Resolved with hydration        Acute diverticulitis- (present on admission)   Assessment & Plan    Diagnosed during his recent hospitalization  Continue ceftriaxone and Flagyl  Abdominal exam is benign at this time we will transition to a short oral course on discharge          Acute kidney injury (HCC)- (present on admission)   Assessment & Plan    Improved with hydration        High anion gap metabolic acidosis- (present on admission)   Assessment & Plan    Likely secondary to dehydration  Resolved with IV fluids        Tobacco abuse- (present on admission)   Assessment & Plan    Counseled on cessation        History of pulmonary embolism- (present on admission)   Assessment & Plan    Patient completed course of Eliquis on 7/30/2018    Given his syncopal episode will check echocardiogram and lower extremity duplex.  If he has signs of RV dysfunction or  significant pulmonary hypertension will consider further workup with a CTA        Leukocytosis- (present on admission)   Assessment & Plan    Related to sepsis/diverticulitis          Quality-Core Measures   Reviewed items::  Labs reviewed, Medications reviewed and Radiology images reviewed  Li catheter::  No Li  DVT prophylaxis pharmacological::  Enoxaparin (Lovenox)  Antibiotics:  Treating active infection/contamination beyond 24 hours perioperative coverage      Plan of care reviewed with patient and discussed with nursing staff

## 2018-10-07 NOTE — CARE PLAN
Problem: Bowel/Gastric:  Goal: Will not experience complications related to bowel motility  Outcome: PROGRESSING AS EXPECTED    Intervention: Assess baseline bowel pattern  Normoactive x4    Intervention: Implement interventions to promote bowel evacuation if inadequate bowel movements in past 48 hours  Early ambulation and senna  Intervention: Implement Bowel Protocol, if applicable  Will implement If needed. Not currently needed      Problem: Pain Management  Goal: Pain level will decrease to patient's comfort goal    Intervention: Follow pain managment plan developed in collaboration with patient and Interdisciplinary Team  Done. Morphine and oxycodon given

## 2018-10-07 NOTE — PROGRESS NOTES
2 RN Skin check with Pete:    Well healed scab over left knee. No signs or areas of skin breakdown noted.

## 2018-10-07 NOTE — PROGRESS NOTES
Monitor Summary     0.14/0.08/0.38  SR: 60s-90s  Rare ectopy     12 hour chart check    2 RN skin check complete

## 2018-10-08 ENCOUNTER — APPOINTMENT (OUTPATIENT)
Dept: RADIOLOGY | Facility: MEDICAL CENTER | Age: 43
DRG: 641 | End: 2018-10-08
Attending: HOSPITALIST
Payer: COMMERCIAL

## 2018-10-08 PROBLEM — I27.20 PULMONARY HTN (HCC): Status: ACTIVE | Noted: 2018-10-08

## 2018-10-08 PROBLEM — S20.229A CONTUSION OF BACK: Status: ACTIVE | Noted: 2018-10-08

## 2018-10-08 LAB
ANION GAP SERPL CALC-SCNC: 8 MMOL/L (ref 0–11.9)
BACTERIA SPEC RESP CULT: NORMAL
BASOPHILS # BLD AUTO: 0.6 % (ref 0–1.8)
BASOPHILS # BLD: 0.04 K/UL (ref 0–0.12)
BUN SERPL-MCNC: 10 MG/DL (ref 8–22)
CALCIUM SERPL-MCNC: 8.8 MG/DL (ref 8.5–10.5)
CHLORIDE SERPL-SCNC: 106 MMOL/L (ref 96–112)
CO2 SERPL-SCNC: 25 MMOL/L (ref 20–33)
CREAT SERPL-MCNC: 0.77 MG/DL (ref 0.5–1.4)
D DIMER PPP IA.FEU-MCNC: <0.4 UG/ML (FEU) (ref 0–0.5)
EOSINOPHIL # BLD AUTO: 0.24 K/UL (ref 0–0.51)
EOSINOPHIL NFR BLD: 3.3 % (ref 0–6.9)
ERYTHROCYTE [DISTWIDTH] IN BLOOD BY AUTOMATED COUNT: 39.4 FL (ref 35.9–50)
GLUCOSE SERPL-MCNC: 98 MG/DL (ref 65–99)
GRAM STN SPEC: NORMAL
HCT VFR BLD AUTO: 37.2 % (ref 42–52)
HGB BLD-MCNC: 12.4 G/DL (ref 14–18)
IMM GRANULOCYTES # BLD AUTO: 0.02 K/UL (ref 0–0.11)
IMM GRANULOCYTES NFR BLD AUTO: 0.3 % (ref 0–0.9)
LYMPHOCYTES # BLD AUTO: 3.45 K/UL (ref 1–4.8)
LYMPHOCYTES NFR BLD: 47.9 % (ref 22–41)
MAGNESIUM SERPL-MCNC: 2 MG/DL (ref 1.5–2.5)
MCH RBC QN AUTO: 28.8 PG (ref 27–33)
MCHC RBC AUTO-ENTMCNC: 33.3 G/DL (ref 33.7–35.3)
MCV RBC AUTO: 86.5 FL (ref 81.4–97.8)
MONOCYTES # BLD AUTO: 0.7 K/UL (ref 0–0.85)
MONOCYTES NFR BLD AUTO: 9.7 % (ref 0–13.4)
NEUTROPHILS # BLD AUTO: 2.75 K/UL (ref 1.82–7.42)
NEUTROPHILS NFR BLD: 38.2 % (ref 44–72)
NRBC # BLD AUTO: 0 K/UL
NRBC BLD-RTO: 0 /100 WBC
PHOSPHATE SERPL-MCNC: 3.4 MG/DL (ref 2.5–4.5)
PLATELET # BLD AUTO: 124 K/UL (ref 164–446)
PMV BLD AUTO: 10.1 FL (ref 9–12.9)
POTASSIUM SERPL-SCNC: 3.5 MMOL/L (ref 3.6–5.5)
RBC # BLD AUTO: 4.3 M/UL (ref 4.7–6.1)
SIGNIFICANT IND 70042: NORMAL
SITE SITE: NORMAL
SODIUM SERPL-SCNC: 139 MMOL/L (ref 135–145)
SOURCE SOURCE: NORMAL
WBC # BLD AUTO: 7.2 K/UL (ref 4.8–10.8)

## 2018-10-08 PROCEDURE — 99233 SBSQ HOSP IP/OBS HIGH 50: CPT | Performed by: INTERNAL MEDICINE

## 2018-10-08 PROCEDURE — 700105 HCHG RX REV CODE 258: Performed by: HOSPITALIST

## 2018-10-08 PROCEDURE — A9270 NON-COVERED ITEM OR SERVICE: HCPCS | Performed by: INTERNAL MEDICINE

## 2018-10-08 PROCEDURE — 93970 EXTREMITY STUDY: CPT

## 2018-10-08 PROCEDURE — 84100 ASSAY OF PHOSPHORUS: CPT

## 2018-10-08 PROCEDURE — A9270 NON-COVERED ITEM OR SERVICE: HCPCS | Performed by: HOSPITALIST

## 2018-10-08 PROCEDURE — 85025 COMPLETE CBC W/AUTO DIFF WBC: CPT

## 2018-10-08 PROCEDURE — 700111 HCHG RX REV CODE 636 W/ 250 OVERRIDE (IP): Performed by: INTERNAL MEDICINE

## 2018-10-08 PROCEDURE — 700111 HCHG RX REV CODE 636 W/ 250 OVERRIDE (IP): Performed by: HOSPITALIST

## 2018-10-08 PROCEDURE — 700102 HCHG RX REV CODE 250 W/ 637 OVERRIDE(OP): Performed by: INTERNAL MEDICINE

## 2018-10-08 PROCEDURE — 83735 ASSAY OF MAGNESIUM: CPT

## 2018-10-08 PROCEDURE — 80048 BASIC METABOLIC PNL TOTAL CA: CPT

## 2018-10-08 PROCEDURE — 700102 HCHG RX REV CODE 250 W/ 637 OVERRIDE(OP): Performed by: HOSPITALIST

## 2018-10-08 PROCEDURE — 36415 COLL VENOUS BLD VENIPUNCTURE: CPT

## 2018-10-08 PROCEDURE — 770020 HCHG ROOM/CARE - TELE (206)

## 2018-10-08 PROCEDURE — 85379 FIBRIN DEGRADATION QUANT: CPT

## 2018-10-08 RX ORDER — KETOROLAC TROMETHAMINE 30 MG/ML
30 INJECTION, SOLUTION INTRAMUSCULAR; INTRAVENOUS EVERY 6 HOURS PRN
Status: DISCONTINUED | OUTPATIENT
Start: 2018-10-08 | End: 2018-10-09 | Stop reason: HOSPADM

## 2018-10-08 RX ADMIN — ACYCLOVIR 400 MG: 800 TABLET ORAL at 05:16

## 2018-10-08 RX ADMIN — ENOXAPARIN SODIUM 40 MG: 100 INJECTION SUBCUTANEOUS at 05:12

## 2018-10-08 RX ADMIN — ASPIRIN 81 MG: 81 TABLET, CHEWABLE ORAL at 05:12

## 2018-10-08 RX ADMIN — OXYCODONE HYDROCHLORIDE 10 MG: 5 TABLET ORAL at 21:22

## 2018-10-08 RX ADMIN — MORPHINE SULFATE 2 MG: 4 INJECTION INTRAVENOUS at 02:27

## 2018-10-08 RX ADMIN — SIMVASTATIN 10 MG: 20 TABLET, FILM COATED ORAL at 21:22

## 2018-10-08 RX ADMIN — OXYCODONE HYDROCHLORIDE 10 MG: 5 TABLET ORAL at 05:11

## 2018-10-08 RX ADMIN — SODIUM CHLORIDE: 9 INJECTION, SOLUTION INTRAVENOUS at 14:54

## 2018-10-08 RX ADMIN — ACYCLOVIR 400 MG: 800 TABLET ORAL at 11:11

## 2018-10-08 RX ADMIN — CEFTRIAXONE SODIUM 2 G: 2 INJECTION, POWDER, FOR SOLUTION INTRAMUSCULAR; INTRAVENOUS at 05:12

## 2018-10-08 RX ADMIN — METRONIDAZOLE 500 MG: 500 TABLET ORAL at 21:22

## 2018-10-08 RX ADMIN — METRONIDAZOLE 500 MG: 500 TABLET ORAL at 14:54

## 2018-10-08 RX ADMIN — ACYCLOVIR 400 MG: 800 TABLET ORAL at 17:18

## 2018-10-08 RX ADMIN — OXYCODONE HYDROCHLORIDE 10 MG: 5 TABLET ORAL at 16:06

## 2018-10-08 RX ADMIN — OXYCODONE HYDROCHLORIDE 10 MG: 5 TABLET ORAL at 11:11

## 2018-10-08 RX ADMIN — OMEPRAZOLE 20 MG: 20 CAPSULE, DELAYED RELEASE ORAL at 17:18

## 2018-10-08 RX ADMIN — KETOROLAC TROMETHAMINE 30 MG: 30 INJECTION, SOLUTION INTRAMUSCULAR at 14:53

## 2018-10-08 RX ADMIN — FAMOTIDINE 20 MG: 20 TABLET ORAL at 17:18

## 2018-10-08 RX ADMIN — METRONIDAZOLE 500 MG: 500 TABLET ORAL at 05:12

## 2018-10-08 RX ADMIN — FAMOTIDINE 20 MG: 20 TABLET ORAL at 05:12

## 2018-10-08 RX ADMIN — ONDANSETRON 4 MG: 4 TABLET, ORALLY DISINTEGRATING ORAL at 07:36

## 2018-10-08 ASSESSMENT — ENCOUNTER SYMPTOMS
ABDOMINAL PAIN: 1
SEIZURES: 0
DIARRHEA: 1
BLOOD IN STOOL: 0
SPEECH CHANGE: 0
NECK PAIN: 0
MEMORY LOSS: 0
STRIDOR: 0
FOCAL WEAKNESS: 0
VOMITING: 0
EYE DISCHARGE: 0
NERVOUS/ANXIOUS: 0
EYE PAIN: 0
HALLUCINATIONS: 0
WEAKNESS: 0
PALPITATIONS: 0
FLANK PAIN: 0
FEVER: 0
NAUSEA: 0
LOSS OF CONSCIOUSNESS: 0
EYE REDNESS: 0
SHORTNESS OF BREATH: 0
BACK PAIN: 1
HEADACHES: 0
COUGH: 0
BLURRED VISION: 0
FALLS: 0
SPUTUM PRODUCTION: 0
ORTHOPNEA: 0
HEMOPTYSIS: 0

## 2018-10-08 ASSESSMENT — LIFESTYLE VARIABLES: SUBSTANCE_ABUSE: 0

## 2018-10-08 ASSESSMENT — PAIN SCALES - GENERAL
PAINLEVEL_OUTOF10: 6
PAINLEVEL_OUTOF10: 7
PAINLEVEL_OUTOF10: 7

## 2018-10-08 NOTE — DIETARY
Nutrition Services: pt with poor PO intake PTA, per nutrition admission screening.  DX: Sepsis.  He is eating 100% of all meals on Cardiac diet, he states and is not experiencing N/V at this time.  No further nutrition interventions indicated at this time.  Please consult RD prn.

## 2018-10-08 NOTE — PROGRESS NOTES
Received Bedside report. Assumed care at 1900. This pt is AOx4, ambulatory with SBA, voiding appropriately, has pain in lower back and neck . Patient and RN discussed plan of care: questions answered. Labs noted, assessment complete, patient tolerating cardiac diet. Tele box in place. Pt is on RA. Call light in place, fall precautions in place, patient educated on importance of calling for assistance. No additional needs at this time. VSS

## 2018-10-08 NOTE — CARE PLAN
Problem: Safety  Goal: Will remain free from injury  Outcome: PROGRESSING AS EXPECTED  Patient's risk for injury and falls assessed. Appropriate safety precautions in place. Patient educated to utilize call light for needs. Patient verbalizes understanding.    Problem: Knowledge Deficit  Goal: Knowledge of disease process/condition, treatment plan, diagnostic tests, and medications will improve  Outcome: PROGRESSING AS EXPECTED  Patient is educated of disease process and condition. Treatment team has included patient in plan of care. All medications indications and side effects are explained. Patient is encouraged to ask questions. Patient indicates understanding.

## 2018-10-09 VITALS
HEIGHT: 68 IN | WEIGHT: 202.6 LBS | OXYGEN SATURATION: 98 % | RESPIRATION RATE: 18 BRPM | SYSTOLIC BLOOD PRESSURE: 130 MMHG | BODY MASS INDEX: 30.71 KG/M2 | HEART RATE: 78 BPM | TEMPERATURE: 97.3 F | DIASTOLIC BLOOD PRESSURE: 97 MMHG

## 2018-10-09 PROCEDURE — 700111 HCHG RX REV CODE 636 W/ 250 OVERRIDE (IP): Performed by: INTERNAL MEDICINE

## 2018-10-09 PROCEDURE — A9270 NON-COVERED ITEM OR SERVICE: HCPCS | Performed by: INTERNAL MEDICINE

## 2018-10-09 PROCEDURE — A9270 NON-COVERED ITEM OR SERVICE: HCPCS | Performed by: HOSPITALIST

## 2018-10-09 PROCEDURE — 700111 HCHG RX REV CODE 636 W/ 250 OVERRIDE (IP): Performed by: HOSPITALIST

## 2018-10-09 PROCEDURE — 700105 HCHG RX REV CODE 258: Performed by: HOSPITALIST

## 2018-10-09 PROCEDURE — 700102 HCHG RX REV CODE 250 W/ 637 OVERRIDE(OP): Performed by: HOSPITALIST

## 2018-10-09 PROCEDURE — 99239 HOSP IP/OBS DSCHRG MGMT >30: CPT | Performed by: INTERNAL MEDICINE

## 2018-10-09 PROCEDURE — 700102 HCHG RX REV CODE 250 W/ 637 OVERRIDE(OP): Performed by: INTERNAL MEDICINE

## 2018-10-09 RX ORDER — CEFDINIR 300 MG/1
300 CAPSULE ORAL 2 TIMES DAILY
Qty: 14 CAP | Refills: 0 | Status: SHIPPED | OUTPATIENT
Start: 2018-10-09 | End: 2018-10-23

## 2018-10-09 RX ORDER — ACYCLOVIR 400 MG/1
400 TABLET ORAL 3 TIMES DAILY
Qty: 20 TAB | Refills: 0 | Status: ON HOLD | OUTPATIENT
Start: 2018-10-09 | End: 2019-03-21

## 2018-10-09 RX ORDER — OXYCODONE HYDROCHLORIDE 5 MG/1
5 TABLET ORAL EVERY 4 HOURS PRN
Qty: 20 TAB | Refills: 0 | Status: ON HOLD | OUTPATIENT
Start: 2018-10-09 | End: 2018-10-16

## 2018-10-09 RX ORDER — LIDOCAINE 50 MG/G
1 PATCH TOPICAL EVERY 24 HOURS
Qty: 10 PATCH | Refills: 0 | Status: SHIPPED | OUTPATIENT
Start: 2018-10-09 | End: 2018-10-12

## 2018-10-09 RX ORDER — CEFDINIR 300 MG/1
300 CAPSULE ORAL 2 TIMES DAILY
Qty: 20 CAP | Refills: 0 | Status: SHIPPED | OUTPATIENT
Start: 2018-10-09 | End: 2018-10-09

## 2018-10-09 RX ORDER — METRONIDAZOLE 500 MG/1
500 TABLET ORAL EVERY 8 HOURS
Qty: 21 TAB | Refills: 0 | Status: SHIPPED | OUTPATIENT
Start: 2018-10-09 | End: 2018-10-23

## 2018-10-09 RX ADMIN — CEFTRIAXONE SODIUM 2 G: 2 INJECTION, POWDER, FOR SOLUTION INTRAMUSCULAR; INTRAVENOUS at 05:15

## 2018-10-09 RX ADMIN — METRONIDAZOLE 500 MG: 500 TABLET ORAL at 13:16

## 2018-10-09 RX ADMIN — METRONIDAZOLE 500 MG: 500 TABLET ORAL at 05:15

## 2018-10-09 RX ADMIN — ENOXAPARIN SODIUM 40 MG: 100 INJECTION SUBCUTANEOUS at 05:14

## 2018-10-09 RX ADMIN — ACYCLOVIR 400 MG: 800 TABLET ORAL at 05:17

## 2018-10-09 RX ADMIN — ONDANSETRON 4 MG: 4 TABLET, ORALLY DISINTEGRATING ORAL at 09:52

## 2018-10-09 RX ADMIN — OXYCODONE HYDROCHLORIDE 10 MG: 5 TABLET ORAL at 05:15

## 2018-10-09 RX ADMIN — ACYCLOVIR 400 MG: 800 TABLET ORAL at 13:16

## 2018-10-09 RX ADMIN — OXYCODONE HYDROCHLORIDE 5 MG: 5 TABLET ORAL at 13:16

## 2018-10-09 RX ADMIN — FAMOTIDINE 20 MG: 20 TABLET ORAL at 05:15

## 2018-10-09 RX ADMIN — SODIUM CHLORIDE: 9 INJECTION, SOLUTION INTRAVENOUS at 05:17

## 2018-10-09 RX ADMIN — ASPIRIN 81 MG: 81 TABLET, CHEWABLE ORAL at 05:15

## 2018-10-09 ASSESSMENT — PAIN SCALES - GENERAL
PAINLEVEL_OUTOF10: 5
PAINLEVEL_OUTOF10: 0

## 2018-10-09 NOTE — CARE PLAN
Problem: Knowledge Deficit  Goal: Knowledge of disease process/condition, treatment plan, diagnostic tests, and medications will improve  Outcome: PROGRESSING AS EXPECTED  Patient is educated of disease process and condition. Treatment team has included patient in plan of care. All medications indications and side effects are explained. Patient is encouraged to ask questions. Patient indicates understanding.    Problem: Pain Management  Goal: Pain level will decrease to patient's comfort goal  Outcome: PROGRESSING AS EXPECTED  Patient educated to pain scale system. Patient encouraged to verbalize discomfort. Patient taught about non-pharmacological pain management. Patient is comfortable at this time without statements of discomfort or pain.

## 2018-10-09 NOTE — DISCHARGE INSTRUCTIONS
Discharge Instructions    Discharged to home by car with relative. Discharged via wheelchair, hospital escort: Yes.  Special equipment needed: Not Applicable    Be sure to schedule a follow-up appointment with your primary care doctor or any specialists as instructed.     Discharge Plan:   Smoking Cessation Offered: Patient Refused  Pneumococcal Vaccine Administered/Refused: Not given - Patient refused pneumococcal vaccine  Influenza Vaccine Indication: Patient Refuses    I understand that a diet low in cholesterol, fat, and sodium is recommended for good health. Unless I have been given specific instructions below for another diet, I accept this instruction as my diet prescription.   Other diet: regular    Special Instructions: Sepsis, Adult  Sepsis is a serious infection of your blood or tissues that affects your whole body. The infection that causes sepsis may be bacterial, viral, fungal, or parasitic. Sepsis may be life threatening. Sepsis can cause your blood pressure to drop. This may result in shock. Shock causes your central nervous system and your organs to stop working correctly.  What increases the risk?  Sepsis can happen in anyone, but it is more likely to happen in people who have weakened immune systems.  What are the signs or symptoms?  Symptoms of sepsis can include:  · Fever or low body temperature (hypothermia).  · Rapid breathing (hyperventilation).  · Chills.  · Rapid heartbeat (tachycardia).  · Confusion or light-headedness.  · Trouble breathing.  · Urinating much less than usual.  · Cool, clammy skin or red, flushed skin.  · Other problems with the heart, kidneys, or brain.  How is this diagnosed?  Your health care provider will likely do tests to look for an infection, to see if the infection has spread to your blood, and to see how serious your condition is. Tests can include:  · Blood tests, including cultures of your blood.  · Cultures of other fluids from your body, such  as:  ¨ Urine.  ¨ Pus from wounds.  ¨ Mucus coughed up from your lungs.  · Urine tests other than cultures.  · X-ray exams or other imaging tests.  How is this treated?  Treatment will begin with elimination of the source of infection. If your sepsis is likely caused by a bacterial or fungal infection, you will be given antibiotic or antifungal medicines.  You may also receive:  · Oxygen.  · Fluids through an IV tube.  · Medicines to increase your blood pressure.  · A machine to clean your blood (dialysis) if your kidneys fail.  · A machine to help you breathe if your lungs fail.  Get help right away if:  You get an infection or develop any of the signs and symptoms of sepsis after surgery or a hospitalization.  This information is not intended to replace advice given to you by your health care provider. Make sure you discuss any questions you have with your health care provider.  Document Released: 09/15/2004 Document Revised: 05/25/2017 Document Reviewed: 08/25/2014  [employer] Interactive Patient Education © 2017 Elsevier Inc.      · Is patient discharged on Warfarin / Coumadin?   No     Amoxicillin; Clavulanic Acid tablets  What is this medicine?  AMOXICILLIN; CLAVULANIC ACID (a mox i JUVENCIO in; MARYJANE benjamin ic AS id) is a penicillin antibiotic. It is used to treat certain kinds of bacterial infections. It will not work for colds, flu, or other viral infections.  This medicine may be used for other purposes; ask your health care provider or pharmacist if you have questions.  COMMON BRAND NAME(S): Augmentin  What should I tell my health care provider before I take this medicine?  They need to know if you have any of these conditions:  -bowel disease, like colitis  -kidney disease  -liver disease  -mononucleosis  -an unusual or allergic reaction to amoxicillin, penicillin, cephalosporin, other antibiotics, clavulanic acid, other medicines, foods, dyes, or preservatives  -pregnant or trying to get  pregnant  -breast-feeding  How should I use this medicine?  Take this medicine by mouth with a full glass of water. Follow the directions on the prescription label. Take at the start of a meal. Do not crush or chew. If the tablet has a score line, you may cut it in half at the score line for easier swallowing. Take your medicine at regular intervals. Do not take your medicine more often than directed. Take all of your medicine as directed even if you think you are better. Do not skip doses or stop your medicine early.  Talk to your pediatrician regarding the use of this medicine in children. Special care may be needed.  Overdosage: If you think you have taken too much of this medicine contact a poison control center or emergency room at once.  NOTE: This medicine is only for you. Do not share this medicine with others.  What if I miss a dose?  If you miss a dose, take it as soon as you can. If it is almost time for your next dose, take only that dose. Do not take double or extra doses.  What may interact with this medicine?  -allopurinol  -anticoagulants  -birth control pills  -methotrexate  -probenecid  This list may not describe all possible interactions. Give your health care provider a list of all the medicines, herbs, non-prescription drugs, or dietary supplements you use. Also tell them if you smoke, drink alcohol, or use illegal drugs. Some items may interact with your medicine.  What should I watch for while using this medicine?  Tell your doctor or health care professional if your symptoms do not improve.  Do not treat diarrhea with over the counter products. Contact your doctor if you have diarrhea that lasts more than 2 days or if it is severe and watery.  If you have diabetes, you may get a false-positive result for sugar in your urine. Check with your doctor or health care professional.  Birth control pills may not work properly while you are taking this medicine. Talk to your doctor about using an  extra method of birth control.  What side effects may I notice from receiving this medicine?  Side effects that you should report to your doctor or health care professional as soon as possible:  -allergic reactions like skin rash, itching or hives, swelling of the face, lips, or tongue  -breathing problems  -dark urine  -fever or chills, sore throat  -redness, blistering, peeling or loosening of the skin, including inside the mouth  -seizures  -trouble passing urine or change in the amount of urine  -unusual bleeding, bruising  -unusually weak or tired  -white patches or sores in the mouth or throat  Side effects that usually do not require medical attention (report to your doctor or health care professional if they continue or are bothersome):  -diarrhea  -dizziness  -headache  -nausea, vomiting  -stomach upset  -vaginal or anal irritation  This list may not describe all possible side effects. Call your doctor for medical advice about side effects. You may report side effects to FDA at 0-946-FDA-4604.  Where should I keep my medicine?  Keep out of the reach of children.  Store at room temperature below 25 degrees C (77 degrees F). Keep container tightly closed. Throw away any unused medicine after the expiration date.  NOTE: This sheet is a summary. It may not cover all possible information. If you have questions about this medicine, talk to your doctor, pharmacist, or health care provider.  © 2018 Elsevier/Gold Standard (2009-03-12 12:04:30)    Depression / Suicide Risk    As you are discharged from this [facility] facility, it is important to learn how to keep safe from harming yourself.    Recognize the warning signs:  · Abrupt changes in personality, positive or negative- including increase in energy   · Giving away possessions  · Change in eating patterns- significant weight changes-  positive or negative  · Change in sleeping patterns- unable to sleep or sleeping all the time   · Unwillingness or inability  to communicate  · Depression  · Unusual sadness, discouragement and loneliness  · Talk of wanting to die  · Neglect of personal appearance   · Rebelliousness- reckless behavior  · Withdrawal from people/activities they love  · Confusion- inability to concentrate     If you or a loved one observes any of these behaviors or has concerns about self-harm, here's what you can do:  · Talk about it- your feelings and reasons for harming yourself  · Remove any means that you might use to hurt yourself (examples: pills, rope, extension cords, firearm)  · Get professional help from the community (Mental Health, Substance Abuse, psychological counseling)  · Do not be alone:Call your Safe Contact- someone whom you trust who will be there for you.  · Call your local CRISIS HOTLINE 732-6164 or 279-161-4488  · Call your local Children's Mobile Crisis Response Team [place] (671) 436-9331 or www.Incentive  · Call the toll free National Suicide Prevention Hotlines   · National Suicide Prevention Lifeline 208-144-WQAF (5082)  · National Hope Line Network 800-SUICIDE (694-7362)

## 2018-10-09 NOTE — PROGRESS NOTES
Patient discharged home. A&O x4.  Discharge packet provided, questions answered. IV lines dc'ed and tele box off. Wife at bedside. Medicated for pain prior to leaving. Transported by wheelchair to [facility] entrance where wife picked him up.

## 2018-10-09 NOTE — PROGRESS NOTES
Received Bedside report. Assumed care at 1900. This pt is AOx4, ambulatory with SBA, voiding appropriately via urinal, has neck and lower back pain. Patient and RN discussed plan of care: questions answered. Labs noted, assessment complete, patient tolerating cardiac diet. Tele box in place. Pt is on RA. Call light in place, fall precautions in place, patient educated on importance of calling for assistance. No additional needs at this time. VSS

## 2018-10-09 NOTE — PROGRESS NOTES
Assumed care of patient, bedside report received. Complaint of nausea at this time, medicated with PRN Zofran. No complaints of pain, VSS. Pt alert and oriented, assessment complete. Tele box in place. Patient educated to call for assistance. Bed in locked and low position, call light in reach.

## 2018-10-09 NOTE — PROGRESS NOTES
[facility] Hospitalist Progress Note    Date of Service: 10/8/2018    Chief Complaint  43 y.o. male admitted 10/6/2018 with syncope, concern for possible sepsis recently diagnosed with diverticulitis and discharged home on antibiotics he reports that he was unable to keep his antibiotics secondary to nausea and vomiting.  He has a previous history of PE following knee surgery he completed his course of anticoagulation and has been off any anticoagulation since July    Interval Problem Update  Patient seems anxious this morning but is otherwise feeling improved, he complains of diarrhea but it is noted that he has been on stool softeners .  He would like to shower.  Reviewed care plan discussed possible discharge tomorrow if he remains stable.  Spouse at bedside    Consultants/Specialty  None    Disposition  Probably home tomorrow        Review of Systems   Constitutional: Negative for fever and malaise/fatigue.   HENT: Negative for congestion, ear discharge and nosebleeds.    Eyes: Negative for blurred vision, pain, discharge and redness.   Respiratory: Negative for cough, hemoptysis, sputum production, shortness of breath and stridor.    Cardiovascular: Negative for chest pain, palpitations, orthopnea and leg swelling.   Gastrointestinal: Positive for abdominal pain (Mild) and diarrhea. Negative for blood in stool, melena, nausea and vomiting.   Genitourinary: Negative for dysuria, flank pain and hematuria.   Musculoskeletal: Positive for back pain (From fall in shower, CT negative). Negative for falls, joint pain and neck pain.   Skin: Negative.    Neurological: Negative for speech change, focal weakness, seizures, loss of consciousness, weakness and headaches.   Psychiatric/Behavioral: Negative for hallucinations, memory loss and substance abuse. The patient is not nervous/anxious.    All other systems reviewed and are negative.     Physical Exam  Laboratory/Imaging   Hemodynamics  Temp (24hrs), Av.5 °C (97.7  °F), Min:36.3 °C (97.4 °F), Max:36.7 °C (98.1 °F)   Temperature: 36.6 °C (97.8 °F)  Pulse  Av.8  Min: 68  Max: 116    Blood Pressure: 131/86      Respiratory      Respiration: 18, Pulse Oximetry: 95 %        RUL Breath Sounds: Clear, RML Breath Sounds: Clear, RLL Breath Sounds: Diminished, JEFFERY Breath Sounds: Clear, LLL Breath Sounds: Diminished    Fluids    Intake/Output Summary (Last 24 hours) at 10/08/18 1749  Last data filed at 10/08/18 1700   Gross per 24 hour   Intake              990 ml   Output             2500 ml   Net            -1510 ml       Nutrition  Orders Placed This Encounter   Procedures   • Diet Order Cardiac     Standing Status:   Standing     Number of Occurrences:   1     Order Specific Question:   Diet:     Answer:   Cardiac [6]     Physical Exam   Constitutional: He is oriented to person, place, and time. He appears well-developed and well-nourished. No distress.   Stout, with abdominal obesity   HENT:   Head: Normocephalic and atraumatic.   Right Ear: External ear normal.   Left Ear: External ear normal.   Mouth/Throat: Oropharynx is clear and moist. No oropharyngeal exudate.   Eyes: Pupils are equal, round, and reactive to light. Conjunctivae and EOM are normal. Right eye exhibits no discharge. Left eye exhibits no discharge. No scleral icterus.   Neck: Neck supple. No JVD present. No tracheal deviation present.   Cardiovascular: Normal rate and regular rhythm.  Exam reveals no gallop and no friction rub.    No murmur heard.  Pulmonary/Chest: Effort normal and breath sounds normal. No stridor. No respiratory distress. He has no wheezes. He has no rales. He exhibits no tenderness.   Abdominal: Soft. Bowel sounds are normal. He exhibits no distension and no mass. There is tenderness (minimal suprapubic). There is no rebound and no guarding.   Musculoskeletal: He exhibits no edema or tenderness.   markedly tender over C7/T1 area no soft tissue swelling or muscle spasm     Neurological: He  is alert and oriented to person, place, and time. No cranial nerve deficit. He exhibits normal muscle tone.   Skin: Skin is warm and dry. He is not diaphoretic. No cyanosis. Nails show no clubbing.   Psychiatric:   Very anxious   Nursing note and vitals reviewed.      Recent Labs      10/06/18   1142  10/07/18   0221  10/08/18   0223   WBC  14.9*  8.6  7.2   RBC  6.35*  4.86  4.30*   HEMOGLOBIN  18.3*  14.3  12.4*   HEMATOCRIT  51.4  40.6*  37.2*   MCV  80.3*  83.5  86.5   MCH  29.3  29.4  28.8   MCHC  36.5*  35.2  33.3*   RDW  35.1*  38.6  39.4   PLATELETCT  89*  140*  124*   MPV  11.5  10.4  10.1     Recent Labs      10/06/18   1142  10/07/18   0221  10/08/18   0223   SODIUM  135  136  139   POTASSIUM  4.1  3.4*  3.5*   CHLORIDE  95*  100  106   CO2  21  29  25   GLUCOSE  157*  93  98   BUN  33*  20  10   CREATININE  1.96*  1.20  0.77   CALCIUM  11.4*  8.8  8.8     Recent Labs      10/06/18   1142   APTT  27.4   INR  0.99     Recent Labs      10/06/18   1142   BNPBTYPENAT  30              Assessment/Plan     Hypokalemia   Assessment & Plan    Replete and monitor         Elevated troponin- (present on admission)   Assessment & Plan    Follow-up troponin normal  Echo with EF 70        Syncope   Assessment & Plan    Suspect vasovagal in the setting of dehydration and vasodilation during hot shower  -He seems hemodynamically stable -we will allow him to shower here and see if there are similar symptoms        Hypercalcemia- (present on admission)   Assessment & Plan    Resolved with hydration        Acute diverticulitis- (present on admission)   Assessment & Plan    Diagnosed during his recent hospitalization  Continue ceftriaxone and Flagyl  Minimal abdominal symptoms, probably can resume oral therapy at discharge           Acute kidney injury (HCC)- (present on admission)   Assessment & Plan    Improved with hydration        High anion gap metabolic acidosis- (present on admission)   Assessment & Plan    Likely  secondary to dehydration  Resolved with IV fluids        Tobacco abuse- (present on admission)   Assessment & Plan    Counseled on cessation        History of pulmonary embolism- (present on admission)   Assessment & Plan    Patient completed course of Eliquis on 7/30/2018  He continues to show mild-moderate pulmonary htn of 38 mm, d-dimer is normal            Leukocytosis- (present on admission)   Assessment & Plan    Related to sepsis/diverticulitis          Quality-Core Measures   Reviewed items::  Labs reviewed, Medications reviewed and Radiology images reviewed  Li catheter::  No Li  DVT prophylaxis pharmacological::  Enoxaparin (Lovenox)  Antibiotics:  Treating active infection/contamination beyond 24 hours perioperative coverage        On Lovenox  On Pepcid  At baseline no PT needed  No Li  On antibiotics for diverticulitis

## 2018-10-10 ENCOUNTER — PATIENT OUTREACH (OUTPATIENT)
Dept: HEALTH INFORMATION MANAGEMENT | Facility: OTHER | Age: 43
End: 2018-10-10

## 2018-10-10 NOTE — PROGRESS NOTES
Placed discharge outreach phone call to pt s/p hospital discharge 10/9/18.  Left voicemail providing my contact information and instructions to call with any questions or concerns.

## 2018-10-11 LAB
BACTERIA BLD CULT: NORMAL
BACTERIA BLD CULT: NORMAL
SIGNIFICANT IND 70042: NORMAL
SIGNIFICANT IND 70042: NORMAL
SITE SITE: NORMAL
SITE SITE: NORMAL
SOURCE SOURCE: NORMAL
SOURCE SOURCE: NORMAL

## 2018-10-12 ENCOUNTER — APPOINTMENT (OUTPATIENT)
Dept: RADIOLOGY | Facility: MEDICAL CENTER | Age: 43
DRG: 392 | End: 2018-10-12
Attending: EMERGENCY MEDICINE
Payer: COMMERCIAL

## 2018-10-12 ENCOUNTER — HOSPITAL ENCOUNTER (INPATIENT)
Facility: MEDICAL CENTER | Age: 43
LOS: 4 days | DRG: 392 | End: 2018-10-16
Attending: EMERGENCY MEDICINE | Admitting: INTERNAL MEDICINE
Payer: COMMERCIAL

## 2018-10-12 PROBLEM — E86.0 DEHYDRATION: Status: ACTIVE | Noted: 2018-10-12

## 2018-10-12 PROBLEM — R79.89 ELEVATED LACTIC ACID LEVEL: Status: ACTIVE | Noted: 2018-10-12

## 2018-10-12 LAB
ALBUMIN SERPL BCP-MCNC: 5.1 G/DL (ref 3.2–4.9)
ALBUMIN/GLOB SERPL: 1.9 G/DL
ALP SERPL-CCNC: 98 U/L (ref 30–99)
ALT SERPL-CCNC: 67 U/L (ref 2–50)
ANION GAP SERPL CALC-SCNC: 12 MMOL/L (ref 0–11.9)
AST SERPL-CCNC: 38 U/L (ref 12–45)
BASOPHILS # BLD AUTO: 0.2 % (ref 0–1.8)
BASOPHILS # BLD: 0.03 K/UL (ref 0–0.12)
BILIRUB SERPL-MCNC: 0.6 MG/DL (ref 0.1–1.5)
BUN SERPL-MCNC: 15 MG/DL (ref 8–22)
CALCIUM SERPL-MCNC: 10.6 MG/DL (ref 8.4–10.2)
CHLORIDE SERPL-SCNC: 104 MMOL/L (ref 96–112)
CO2 SERPL-SCNC: 21 MMOL/L (ref 20–33)
CREAT SERPL-MCNC: 1.07 MG/DL (ref 0.5–1.4)
EOSINOPHIL # BLD AUTO: 0 K/UL (ref 0–0.51)
EOSINOPHIL NFR BLD: 0 % (ref 0–6.9)
ERYTHROCYTE [DISTWIDTH] IN BLOOD BY AUTOMATED COUNT: 37.7 FL (ref 35.9–50)
GLOBULIN SER CALC-MCNC: 2.7 G/DL (ref 1.9–3.5)
GLUCOSE SERPL-MCNC: 131 MG/DL (ref 65–99)
HCT VFR BLD AUTO: 46.4 % (ref 42–52)
HGB BLD-MCNC: 16.2 G/DL (ref 14–18)
IMM GRANULOCYTES # BLD AUTO: 0.09 K/UL (ref 0–0.11)
IMM GRANULOCYTES NFR BLD AUTO: 0.6 % (ref 0–0.9)
LACTATE BLD-SCNC: 2.1 MMOL/L (ref 0.5–2)
LACTATE BLD-SCNC: 3.2 MMOL/L (ref 0.5–2)
LIPASE SERPL-CCNC: 23 U/L (ref 7–58)
LYMPHOCYTES # BLD AUTO: 1.49 K/UL (ref 1–4.8)
LYMPHOCYTES NFR BLD: 9.7 % (ref 22–41)
MCH RBC QN AUTO: 29.2 PG (ref 27–33)
MCHC RBC AUTO-ENTMCNC: 34.9 G/DL (ref 33.7–35.3)
MCV RBC AUTO: 83.6 FL (ref 81.4–97.8)
MONOCYTES # BLD AUTO: 0.43 K/UL (ref 0–0.85)
MONOCYTES NFR BLD AUTO: 2.8 % (ref 0–13.4)
NEUTROPHILS # BLD AUTO: 13.35 K/UL (ref 1.82–7.42)
NEUTROPHILS NFR BLD: 86.7 % (ref 44–72)
NRBC # BLD AUTO: 0 K/UL
NRBC BLD-RTO: 0 /100 WBC
PLATELET # BLD AUTO: 209 K/UL (ref 164–446)
PMV BLD AUTO: 10.6 FL (ref 9–12.9)
POTASSIUM SERPL-SCNC: 4.6 MMOL/L (ref 3.6–5.5)
PROT SERPL-MCNC: 7.8 G/DL (ref 6–8.2)
RBC # BLD AUTO: 5.55 M/UL (ref 4.7–6.1)
SODIUM SERPL-SCNC: 137 MMOL/L (ref 135–145)
WBC # BLD AUTO: 15.4 K/UL (ref 4.8–10.8)

## 2018-10-12 PROCEDURE — 80053 COMPREHEN METABOLIC PANEL: CPT

## 2018-10-12 PROCEDURE — 71045 X-RAY EXAM CHEST 1 VIEW: CPT

## 2018-10-12 PROCEDURE — 36415 COLL VENOUS BLD VENIPUNCTURE: CPT

## 2018-10-12 PROCEDURE — 99222 1ST HOSP IP/OBS MODERATE 55: CPT | Performed by: INTERNAL MEDICINE

## 2018-10-12 PROCEDURE — 770006 HCHG ROOM/CARE - MED/SURG/GYN SEMI*

## 2018-10-12 PROCEDURE — 85025 COMPLETE CBC W/AUTO DIFF WBC: CPT

## 2018-10-12 PROCEDURE — 99285 EMERGENCY DEPT VISIT HI MDM: CPT

## 2018-10-12 PROCEDURE — 96375 TX/PRO/DX INJ NEW DRUG ADDON: CPT

## 2018-10-12 PROCEDURE — 700102 HCHG RX REV CODE 250 W/ 637 OVERRIDE(OP): Performed by: INTERNAL MEDICINE

## 2018-10-12 PROCEDURE — A9270 NON-COVERED ITEM OR SERVICE: HCPCS | Performed by: INTERNAL MEDICINE

## 2018-10-12 PROCEDURE — 304561 HCHG STAT O2

## 2018-10-12 PROCEDURE — 83605 ASSAY OF LACTIC ACID: CPT

## 2018-10-12 PROCEDURE — 700111 HCHG RX REV CODE 636 W/ 250 OVERRIDE (IP): Performed by: INTERNAL MEDICINE

## 2018-10-12 PROCEDURE — 87040 BLOOD CULTURE FOR BACTERIA: CPT

## 2018-10-12 PROCEDURE — 83690 ASSAY OF LIPASE: CPT

## 2018-10-12 PROCEDURE — 700111 HCHG RX REV CODE 636 W/ 250 OVERRIDE (IP): Performed by: EMERGENCY MEDICINE

## 2018-10-12 PROCEDURE — 700101 HCHG RX REV CODE 250: Performed by: INTERNAL MEDICINE

## 2018-10-12 PROCEDURE — 74177 CT ABD & PELVIS W/CONTRAST: CPT

## 2018-10-12 PROCEDURE — 700117 HCHG RX CONTRAST REV CODE 255: Performed by: EMERGENCY MEDICINE

## 2018-10-12 PROCEDURE — 700105 HCHG RX REV CODE 258: Performed by: EMERGENCY MEDICINE

## 2018-10-12 PROCEDURE — 700105 HCHG RX REV CODE 258: Performed by: INTERNAL MEDICINE

## 2018-10-12 PROCEDURE — 96374 THER/PROPH/DIAG INJ IV PUSH: CPT

## 2018-10-12 RX ORDER — HALOPERIDOL 5 MG/ML
5 INJECTION INTRAMUSCULAR ONCE
Status: COMPLETED | OUTPATIENT
Start: 2018-10-12 | End: 2018-10-12

## 2018-10-12 RX ORDER — BISACODYL 10 MG
10 SUPPOSITORY, RECTAL RECTAL
Status: DISCONTINUED | OUTPATIENT
Start: 2018-10-12 | End: 2018-10-16 | Stop reason: HOSPADM

## 2018-10-12 RX ORDER — ONDANSETRON 2 MG/ML
4 INJECTION INTRAMUSCULAR; INTRAVENOUS ONCE
Status: COMPLETED | OUTPATIENT
Start: 2018-10-12 | End: 2018-10-12

## 2018-10-12 RX ORDER — AMITRIPTYLINE HYDROCHLORIDE 50 MG/1
100 TABLET, FILM COATED ORAL NIGHTLY
Status: DISCONTINUED | OUTPATIENT
Start: 2018-10-12 | End: 2018-10-16 | Stop reason: HOSPADM

## 2018-10-12 RX ORDER — POLYETHYLENE GLYCOL 3350 17 G/17G
1 POWDER, FOR SOLUTION ORAL
Status: DISCONTINUED | OUTPATIENT
Start: 2018-10-12 | End: 2018-10-16 | Stop reason: HOSPADM

## 2018-10-12 RX ORDER — ONDANSETRON 4 MG/1
4 TABLET, ORALLY DISINTEGRATING ORAL EVERY 6 HOURS PRN
COMMUNITY
End: 2020-01-07

## 2018-10-12 RX ORDER — AMOXICILLIN 250 MG
2 CAPSULE ORAL 2 TIMES DAILY
Status: DISCONTINUED | OUTPATIENT
Start: 2018-10-12 | End: 2018-10-16 | Stop reason: HOSPADM

## 2018-10-12 RX ORDER — LORAZEPAM 2 MG/ML
0.5 INJECTION INTRAMUSCULAR EVERY 4 HOURS PRN
Status: DISCONTINUED | OUTPATIENT
Start: 2018-10-12 | End: 2018-10-16

## 2018-10-12 RX ORDER — SODIUM CHLORIDE 9 MG/ML
1000 INJECTION, SOLUTION INTRAVENOUS ONCE
Status: COMPLETED | OUTPATIENT
Start: 2018-10-12 | End: 2018-10-12

## 2018-10-12 RX ORDER — SODIUM CHLORIDE AND POTASSIUM CHLORIDE 150; 900 MG/100ML; MG/100ML
INJECTION, SOLUTION INTRAVENOUS CONTINUOUS
Status: DISCONTINUED | OUTPATIENT
Start: 2018-10-12 | End: 2018-10-12

## 2018-10-12 RX ORDER — PROMETHAZINE HYDROCHLORIDE 25 MG/1
12.5-25 TABLET ORAL EVERY 4 HOURS PRN
Status: DISCONTINUED | OUTPATIENT
Start: 2018-10-12 | End: 2018-10-16 | Stop reason: HOSPADM

## 2018-10-12 RX ORDER — OMEPRAZOLE 20 MG/1
20 CAPSULE, DELAYED RELEASE ORAL 2 TIMES DAILY
Status: DISCONTINUED | OUTPATIENT
Start: 2018-10-12 | End: 2018-10-16 | Stop reason: HOSPADM

## 2018-10-12 RX ORDER — SODIUM CHLORIDE 9 MG/ML
2000 INJECTION, SOLUTION INTRAVENOUS ONCE
Status: COMPLETED | OUTPATIENT
Start: 2018-10-12 | End: 2018-10-12

## 2018-10-12 RX ORDER — PROMETHAZINE HYDROCHLORIDE 25 MG/1
12.5-25 SUPPOSITORY RECTAL EVERY 4 HOURS PRN
Status: DISCONTINUED | OUTPATIENT
Start: 2018-10-12 | End: 2018-10-16 | Stop reason: HOSPADM

## 2018-10-12 RX ORDER — ONDANSETRON 4 MG/1
4 TABLET, ORALLY DISINTEGRATING ORAL EVERY 4 HOURS PRN
Status: DISCONTINUED | OUTPATIENT
Start: 2018-10-12 | End: 2018-10-16 | Stop reason: HOSPADM

## 2018-10-12 RX ORDER — DIPHENHYDRAMINE HYDROCHLORIDE 50 MG/ML
50 INJECTION INTRAMUSCULAR; INTRAVENOUS ONCE
Status: COMPLETED | OUTPATIENT
Start: 2018-10-12 | End: 2018-10-12

## 2018-10-12 RX ORDER — OXYCODONE HYDROCHLORIDE 5 MG/1
5 TABLET ORAL EVERY 4 HOURS PRN
Status: DISCONTINUED | OUTPATIENT
Start: 2018-10-12 | End: 2018-10-14

## 2018-10-12 RX ORDER — ACETAMINOPHEN 325 MG/1
650 TABLET ORAL EVERY 6 HOURS PRN
Status: DISCONTINUED | OUTPATIENT
Start: 2018-10-12 | End: 2018-10-16 | Stop reason: HOSPADM

## 2018-10-12 RX ORDER — LABETALOL HYDROCHLORIDE 5 MG/ML
10 INJECTION, SOLUTION INTRAVENOUS EVERY 4 HOURS PRN
Status: DISCONTINUED | OUTPATIENT
Start: 2018-10-12 | End: 2018-10-16 | Stop reason: HOSPADM

## 2018-10-12 RX ORDER — SIMVASTATIN 10 MG
10 TABLET ORAL NIGHTLY
Status: DISCONTINUED | OUTPATIENT
Start: 2018-10-12 | End: 2018-10-16 | Stop reason: HOSPADM

## 2018-10-12 RX ORDER — ACYCLOVIR 200 MG/1
400 CAPSULE ORAL 3 TIMES DAILY
Status: DISCONTINUED | OUTPATIENT
Start: 2018-10-12 | End: 2018-10-16 | Stop reason: HOSPADM

## 2018-10-12 RX ORDER — SODIUM CHLORIDE 9 MG/ML
INJECTION, SOLUTION INTRAVENOUS CONTINUOUS
Status: DISCONTINUED | OUTPATIENT
Start: 2018-10-12 | End: 2018-10-13

## 2018-10-12 RX ORDER — ONDANSETRON 2 MG/ML
4 INJECTION INTRAMUSCULAR; INTRAVENOUS EVERY 4 HOURS PRN
Status: DISCONTINUED | OUTPATIENT
Start: 2018-10-12 | End: 2018-10-16 | Stop reason: HOSPADM

## 2018-10-12 RX ORDER — FAMOTIDINE 20 MG/1
20 TABLET, FILM COATED ORAL 2 TIMES DAILY
Status: DISCONTINUED | OUTPATIENT
Start: 2018-10-12 | End: 2018-10-14

## 2018-10-12 RX ADMIN — SODIUM CHLORIDE 2000 ML: 900 INJECTION, SOLUTION INTRAVENOUS at 15:00

## 2018-10-12 RX ADMIN — OMEPRAZOLE 20 MG: 20 CAPSULE, DELAYED RELEASE ORAL at 19:54

## 2018-10-12 RX ADMIN — AMITRIPTYLINE HYDROCHLORIDE 100 MG: 50 TABLET, FILM COATED ORAL at 20:02

## 2018-10-12 RX ADMIN — ACYCLOVIR 400 MG: 200 CAPSULE ORAL at 19:54

## 2018-10-12 RX ADMIN — SODIUM CHLORIDE: 9 INJECTION, SOLUTION INTRAVENOUS at 18:53

## 2018-10-12 RX ADMIN — HALOPERIDOL LACTATE 5 MG: 5 INJECTION, SOLUTION INTRAMUSCULAR at 13:27

## 2018-10-12 RX ADMIN — METRONIDAZOLE 500 MG: 500 INJECTION, SOLUTION INTRAVENOUS at 20:06

## 2018-10-12 RX ADMIN — SIMVASTATIN 10 MG: 10 TABLET, FILM COATED ORAL at 23:00

## 2018-10-12 RX ADMIN — OXYCODONE HYDROCHLORIDE 5 MG: 5 TABLET ORAL at 18:50

## 2018-10-12 RX ADMIN — SODIUM CHLORIDE 1000 ML: 9 INJECTION, SOLUTION INTRAVENOUS at 13:27

## 2018-10-12 RX ADMIN — FAMOTIDINE 20 MG: 20 TABLET ORAL at 19:54

## 2018-10-12 RX ADMIN — LORAZEPAM 0.5 MG: 2 INJECTION INTRAMUSCULAR; INTRAVENOUS at 19:51

## 2018-10-12 RX ADMIN — IOHEXOL 100 ML: 350 INJECTION, SOLUTION INTRAVENOUS at 16:23

## 2018-10-12 RX ADMIN — ONDANSETRON HYDROCHLORIDE 4 MG: 2 INJECTION INTRAMUSCULAR; INTRAVENOUS at 16:28

## 2018-10-12 RX ADMIN — PROCHLORPERAZINE EDISYLATE 10 MG: 5 INJECTION INTRAMUSCULAR; INTRAVENOUS at 18:50

## 2018-10-12 RX ADMIN — DIPHENHYDRAMINE HYDROCHLORIDE 50 MG: 50 INJECTION INTRAMUSCULAR; INTRAVENOUS at 13:27

## 2018-10-12 ASSESSMENT — COGNITIVE AND FUNCTIONAL STATUS - GENERAL
SUGGESTED CMS G CODE MODIFIER MOBILITY: CH
DAILY ACTIVITIY SCORE: 24
MOBILITY SCORE: 24
SUGGESTED CMS G CODE MODIFIER DAILY ACTIVITY: CH

## 2018-10-12 ASSESSMENT — ENCOUNTER SYMPTOMS
DIARRHEA: 1
CHILLS: 1
SHORTNESS OF BREATH: 0
WEAKNESS: 0
COUGH: 0
ABDOMINAL PAIN: 1
SPUTUM PRODUCTION: 0
LOSS OF CONSCIOUSNESS: 0
NAUSEA: 1
FALLS: 0
CONSTIPATION: 0
HEADACHES: 0
DIZZINESS: 0
DEPRESSION: 0
STRIDOR: 0
VOMITING: 1
PALPITATIONS: 0
TINGLING: 0
FEVER: 1
MYALGIAS: 0

## 2018-10-12 ASSESSMENT — PAIN SCALES - GENERAL
PAINLEVEL_OUTOF10: 4
PAINLEVEL_OUTOF10: 4
PAINLEVEL_OUTOF10: 5

## 2018-10-12 NOTE — ED NOTES
Pt was admitted in our facility 10/6/2018 with syncopal episode, and R/O sepsis\.  He was recently diagnosed with diverticulitis and eventually discharged home on antibiotics.  He presents today with episodic N/V, and diffuse abdominal pain protracting for the past week (Hx of cyclic vomiting syndrome).

## 2018-10-12 NOTE — ED PROVIDER NOTES
"ED Provider Note    CHIEF COMPLAINT  Chief Complaint   Patient presents with   • N/V   • Fever   • Abdominal Pain       HPI  Jerome Cardoza Jr. is a 43 y.o. male who presents with abdominal pain.  The patient's had a history of recurrent abdominal discomfort.  He was recently admitted here to the hospital after he had a syncopal episode and there was concern for possible sepsis from diverticulitis.  The patient was unable to complete his antibiotic course due to his recurrent vomiting.  The patient does have a history of gastroparesis and he states that the source is unclear.  He does admit to marijuana use.  He has not had any associated change in bowel or bladder function.  He does have cramping epigastric abdominal pain.  The patient also has had subjective fevers.    REVIEW OF SYSTEMS  See HPI for further details. All other systems are negative.     PAST MEDICAL HISTORY  Past Medical History:   Diagnosis Date   • Bronchitis    • Chickenpox    • GERD (gastroesophageal reflux disease)    • IBS (irritable bowel syndrome)    • Indigestion    • Influenza    • Pneumonia    • Pulmonary embolism (HCC)    • Renal disorder     Acute Renal Failure from dehydration in the past   • Sleep apnea        SOCIAL HISTORY  Social History     Social History   • Marital status:      Spouse name: N/A   • Number of children: N/A   • Years of education: N/A     Social History Main Topics   • Smoking status: Current Every Day Smoker     Packs/day: 0.50     Years: 22.00     Types: Cigarettes   • Smokeless tobacco: Never Used   • Alcohol use No   • Drug use: Yes     Types: Inhaled      Comment: marijuana occ   • Sexual activity: Yes     Partners: Female     Other Topics Concern   • Not on file     Social History Narrative   • No narrative on file           PHYSICAL EXAM  VITAL SIGNS: /105   Pulse 100   Temp 36.7 °C (98 °F)   Resp 18   Ht 1.727 m (5' 8\")   Wt 86.6 kg (190 lb 14.7 oz)   SpO2 99%   BMI 29.03 " kg/m²   Constitutional: in acute distress, Non-toxic appearance.   HENT: Normocephalic, Atraumatic, tympanic membranes are intact and nonerythematous bilaterally, Oropharynx dry without exudates or erythema, Nose normal.   Eyes: PERRLA, EOMI, Conjunctiva normal.  Neck: Supple without meningismus  Lymphatic: No lymphadenopathy noted.   Cardiovascular: Tachycardic heart rate, Normal rhythm, No murmurs, No rubs, No gallops.   Thorax & Lungs: Normal breath sounds, No respiratory distress, No wheezing, No chest tenderness.   Abdomen: Bowel sounds normal, Soft, No tenderness, no rebound, no guarding, no distention, No masses, No pulsatile masses.   Skin: Warm, Dry, No erythema, No rash.   Back: No tenderness, No CVA tenderness.   Extremities: Atraumatic with symmetric distal pulses, No edema, No tenderness, No cyanosis, No clubbing.   Neurologic: Alert & oriented x 3, cranial nerves II through XII are intact, Normal motor function, Normal sensory function, No focal deficits noted.   Psychiatric: Affect normal, Judgment normal, Mood normal.       RADIOLOGY/PROCEDURES  CT-ABDOMEN-PELVIS WITH   Final Result      1.  No evidence of bowel obstruction or focal inflammatory change.      2.  Fatty liver.      3.  Small hiatal hernia.      4.  Prior cholecystectomy.      5.  Patchy atelectasis within the lung bases.      DX-CHEST-PORTABLE (1 VIEW)   Final Result      No evidence of acute cardiopulmonary process.            COURSE & MEDICAL DECISION MAKING  Pertinent Labs & Imaging studies reviewed. (See chart for details)  This a 43-year-old male who presents the emerge part with abdominal pain and vomiting.  Initially was concerned for recurrence of his gastroparesis and cyclical vomiting syndrome.  The patient appeared clinically dehydrated and therefore he did receive a liter of fluid intravenously as well as Haldol and Benadryl.  This was effective for short time in controlling his emesis however it subsequently did develop  further emesis requiring Zofran.  When he was comfortable after the Haldol and Benadryl I repeated my exam and he did have some focal tenderness in the left lower quadrant and he also had a leukocytosis.  Therefore is concern for possible diverticulitis recurrence but the CT scan does not show any evidence of intra-abdominal inflammation.  Therefore surgical source of his abdominal pain would be very unlikely.  Furthermore the CT scan does not show any evidence of an obstructive process.  The patient has had continued emesis despite treatment and he does have a lactic acidosis which I suspect is from dehydration.  I did order 2 more liters of fluid intravenously to help with the continued tachycardia and suspected dehydration from his recurrent vomiting.  At the time of admission at [facility] he is resting more comfortably.  He continues to be tachycardic and will admit the patient to the hospital for further treatment for suspected gastroparesis.  Of note I did not administer antibiotics as I do not suspect this is infectious.  Blood cultures are pending but it would be unlikely the patient is bacteremic.    FINAL IMPRESSION  1.  Abdominal pain  2.  Vomiting  3.  Suspect secondary to gastroparesis  4.  Dehydration and lactic acidosis     Disposition  The patient will be admitted in stable condition    Electronically signed by: Logan Neal, 10/12/2018 1:15 PM

## 2018-10-12 NOTE — ED NOTES
Pt assessed, recently admitted for sepsis and colitis. No improvement since d/c from hospital on Tuesday. Increased weakness, abd pain, and N/V/D. Int fevers. Current taking Cefdinir, Flagyl, and Acyclovir. Call light within reach, willl cont to monitor    [place] fall assessment completed. Pt is High risk for fall. Interventions complete. Pt placed in yellow non slip socks, wrist band placed, green sign on door. Bed locked in low position, call light in place. Personal possessions in place. Personal needs assessed. Charge nurse notified to move pt to a more visible room if available. Safety assessed. Will monitor frequently.

## 2018-10-12 NOTE — ED NOTES
Med rec complete per S/O at bedside  Allergies reviewed    Patient is currently taking 3 antibiotics

## 2018-10-13 LAB
ALBUMIN SERPL BCP-MCNC: 3.4 G/DL (ref 3.2–4.9)
ALBUMIN/GLOB SERPL: 1.6 G/DL
ALP SERPL-CCNC: 65 U/L (ref 30–99)
ALT SERPL-CCNC: 48 U/L (ref 2–50)
ANION GAP SERPL CALC-SCNC: 5 MMOL/L (ref 0–11.9)
APPEARANCE UR: CLEAR
AST SERPL-CCNC: 23 U/L (ref 12–45)
BILIRUB SERPL-MCNC: 0.5 MG/DL (ref 0.1–1.5)
BILIRUB UR QL STRIP.AUTO: NEGATIVE
BUN SERPL-MCNC: 12 MG/DL (ref 8–22)
CALCIUM SERPL-MCNC: 8.5 MG/DL (ref 8.4–10.2)
CHLORIDE SERPL-SCNC: 108 MMOL/L (ref 96–112)
CO2 SERPL-SCNC: 23 MMOL/L (ref 20–33)
COLOR UR: YELLOW
CREAT SERPL-MCNC: 0.94 MG/DL (ref 0.5–1.4)
ERYTHROCYTE [DISTWIDTH] IN BLOOD BY AUTOMATED COUNT: 39.4 FL (ref 35.9–50)
GLOBULIN SER CALC-MCNC: 2.1 G/DL (ref 1.9–3.5)
GLUCOSE SERPL-MCNC: 94 MG/DL (ref 65–99)
GLUCOSE UR STRIP.AUTO-MCNC: NEGATIVE MG/DL
HCT VFR BLD AUTO: 37.7 % (ref 42–52)
HGB BLD-MCNC: 13.1 G/DL (ref 14–18)
KETONES UR STRIP.AUTO-MCNC: NEGATIVE MG/DL
LACTATE BLD-SCNC: 1.2 MMOL/L (ref 0.5–2)
LEUKOCYTE ESTERASE UR QL STRIP.AUTO: NEGATIVE
MCH RBC QN AUTO: 29.6 PG (ref 27–33)
MCHC RBC AUTO-ENTMCNC: 34.7 G/DL (ref 33.7–35.3)
MCV RBC AUTO: 85.1 FL (ref 81.4–97.8)
MICRO URNS: ABNORMAL
NITRITE UR QL STRIP.AUTO: NEGATIVE
PH UR STRIP.AUTO: 8.5 [PH]
PLATELET # BLD AUTO: 94 K/UL (ref 164–446)
PMV BLD AUTO: 10.2 FL (ref 9–12.9)
POTASSIUM SERPL-SCNC: 3.3 MMOL/L (ref 3.6–5.5)
PROT SERPL-MCNC: 5.5 G/DL (ref 6–8.2)
PROT UR QL STRIP: NEGATIVE MG/DL
RBC # BLD AUTO: 4.43 M/UL (ref 4.7–6.1)
RBC UR QL AUTO: NEGATIVE
SODIUM SERPL-SCNC: 136 MMOL/L (ref 135–145)
SP GR UR STRIP.AUTO: 1.01
WBC # BLD AUTO: 13.8 K/UL (ref 4.8–10.8)

## 2018-10-13 PROCEDURE — 770006 HCHG ROOM/CARE - MED/SURG/GYN SEMI*

## 2018-10-13 PROCEDURE — 700101 HCHG RX REV CODE 250: Performed by: INTERNAL MEDICINE

## 2018-10-13 PROCEDURE — 99232 SBSQ HOSP IP/OBS MODERATE 35: CPT | Performed by: INTERNAL MEDICINE

## 2018-10-13 PROCEDURE — 700102 HCHG RX REV CODE 250 W/ 637 OVERRIDE(OP): Performed by: INTERNAL MEDICINE

## 2018-10-13 PROCEDURE — 36415 COLL VENOUS BLD VENIPUNCTURE: CPT

## 2018-10-13 PROCEDURE — 81003 URINALYSIS AUTO W/O SCOPE: CPT

## 2018-10-13 PROCEDURE — 83605 ASSAY OF LACTIC ACID: CPT

## 2018-10-13 PROCEDURE — 80053 COMPREHEN METABOLIC PANEL: CPT

## 2018-10-13 PROCEDURE — 700111 HCHG RX REV CODE 636 W/ 250 OVERRIDE (IP): Performed by: INTERNAL MEDICINE

## 2018-10-13 PROCEDURE — A9270 NON-COVERED ITEM OR SERVICE: HCPCS | Performed by: INTERNAL MEDICINE

## 2018-10-13 PROCEDURE — 700105 HCHG RX REV CODE 258: Performed by: INTERNAL MEDICINE

## 2018-10-13 PROCEDURE — 87086 URINE CULTURE/COLONY COUNT: CPT

## 2018-10-13 PROCEDURE — 85027 COMPLETE CBC AUTOMATED: CPT

## 2018-10-13 RX ORDER — SODIUM CHLORIDE AND POTASSIUM CHLORIDE 150; 900 MG/100ML; MG/100ML
INJECTION, SOLUTION INTRAVENOUS CONTINUOUS
Status: DISCONTINUED | OUTPATIENT
Start: 2018-10-13 | End: 2018-10-16 | Stop reason: HOSPADM

## 2018-10-13 RX ADMIN — SENNOSIDES AND DOCUSATE SODIUM 2 TABLET: 8.6; 5 TABLET ORAL at 17:44

## 2018-10-13 RX ADMIN — CEFTRIAXONE SODIUM 2 G: 2 INJECTION, POWDER, FOR SOLUTION INTRAMUSCULAR; INTRAVENOUS at 05:23

## 2018-10-13 RX ADMIN — METRONIDAZOLE 500 MG: 500 INJECTION, SOLUTION INTRAVENOUS at 06:00

## 2018-10-13 RX ADMIN — OXYCODONE HYDROCHLORIDE 5 MG: 5 TABLET ORAL at 15:04

## 2018-10-13 RX ADMIN — OXYCODONE HYDROCHLORIDE 5 MG: 5 TABLET ORAL at 20:35

## 2018-10-13 RX ADMIN — POTASSIUM CHLORIDE AND SODIUM CHLORIDE: 900; 150 INJECTION, SOLUTION INTRAVENOUS at 20:41

## 2018-10-13 RX ADMIN — PROMETHAZINE HYDROCHLORIDE 25 MG: 25 TABLET ORAL at 03:47

## 2018-10-13 RX ADMIN — ACYCLOVIR 400 MG: 200 CAPSULE ORAL at 12:45

## 2018-10-13 RX ADMIN — AMITRIPTYLINE HYDROCHLORIDE 100 MG: 50 TABLET, FILM COATED ORAL at 20:35

## 2018-10-13 RX ADMIN — OMEPRAZOLE 20 MG: 20 CAPSULE, DELAYED RELEASE ORAL at 17:44

## 2018-10-13 RX ADMIN — POTASSIUM CHLORIDE AND SODIUM CHLORIDE: 900; 150 INJECTION, SOLUTION INTRAVENOUS at 10:17

## 2018-10-13 RX ADMIN — ACYCLOVIR 400 MG: 200 CAPSULE ORAL at 17:44

## 2018-10-13 RX ADMIN — METRONIDAZOLE 500 MG: 500 INJECTION, SOLUTION INTRAVENOUS at 15:02

## 2018-10-13 RX ADMIN — OXYCODONE HYDROCHLORIDE 5 MG: 5 TABLET ORAL at 10:20

## 2018-10-13 RX ADMIN — METRONIDAZOLE 500 MG: 500 INJECTION, SOLUTION INTRAVENOUS at 21:23

## 2018-10-13 RX ADMIN — SIMVASTATIN 10 MG: 10 TABLET, FILM COATED ORAL at 20:35

## 2018-10-13 RX ADMIN — OMEPRAZOLE 20 MG: 20 CAPSULE, DELAYED RELEASE ORAL at 05:23

## 2018-10-13 RX ADMIN — FAMOTIDINE 20 MG: 20 TABLET ORAL at 17:44

## 2018-10-13 RX ADMIN — ACYCLOVIR 400 MG: 200 CAPSULE ORAL at 05:23

## 2018-10-13 RX ADMIN — LORAZEPAM 0.5 MG: 2 INJECTION INTRAMUSCULAR; INTRAVENOUS at 21:23

## 2018-10-13 RX ADMIN — FAMOTIDINE 20 MG: 20 TABLET ORAL at 05:23

## 2018-10-13 ASSESSMENT — PAIN SCALES - GENERAL
PAINLEVEL_OUTOF10: 5
PAINLEVEL_OUTOF10: 4
PAINLEVEL_OUTOF10: 3
PAINLEVEL_OUTOF10: 4
PAINLEVEL_OUTOF10: 3
PAINLEVEL_OUTOF10: 3
PAINLEVEL_OUTOF10: 5

## 2018-10-13 ASSESSMENT — ENCOUNTER SYMPTOMS
NAUSEA: 1
CHILLS: 0
VOMITING: 1
ABDOMINAL PAIN: 1
PALPITATIONS: 0
SHORTNESS OF BREATH: 0
MYALGIAS: 0
TINGLING: 0
FEVER: 0
COUGH: 0
HEADACHES: 0

## 2018-10-13 NOTE — ASSESSMENT & PLAN NOTE
-Has had it for a while but is been unable to keep down his antibiotics, left lower quadrant pain persists-CT scan did not show anything obvious but at this point in time   -Cont on IV abx   -Depending on how long the patient will need to be hospitalized, he should be finished with antibiotics at the time of discharge  -monitor

## 2018-10-13 NOTE — DIETARY
"Nutrition services: Day 1 of admit. Jerome Cardoza Jr. is a 43 y.o. male with admitting DX of Diverticulitis.    Pt with poor PO intake r/t abdominal pain and nausea x1 day PTA. Hx of cyclic vomiting syndrome and recurrent diverticulitis on Reglan, noted. Pt reporting improved nausea and pain today. He was on a clear liquids diet (PO intake % x1 meal) but has since been advanced to full liquids (no PO records yet) this morning. Wt has been 90.2kg by bed scale on 10/1 --> 86.6kg by standing scale taken 10/12, which reflects 4% wt loss x11 days, however different scale types used may yield some discrepancies between measurements. Will CTM trends and provide nutrition interventions as appropriate.    Assessment:  Height: 172.7 cm (5' 8\")  Weight: 86.6 kg (190 lb 14.7 oz)  Body mass index is 29.03 kg/m² - overweight   Diet/Intake: Full liquids, PO intake: % x1 (clear liquids)    Evaluation:   Labs: K+ 3.3  Meds: Rocephin, Pepcid, Flagyl, Prilosec, Pericolace, Zocor, (PRN: Ativan, Zofran, Compazine, Phenergan, Roxicodone, Miralax, MOM, Dulcolax)  Fluids: 0.9% NaCl with KCl 20mEq infusion @ 125mL/hr  Skin: Intact - no skin breakdown noted  GI: last BM 10/11  I/O's: +785mL x24 hours (no output yet today) - note +1.6L since admit    Malnutrition Risk: Hx of intractable N/V, diverticulitis flares with periods of subsequently poor PO intake.    Recommendations/Plan:  1. Continue current diet. ADAT.   2. Encourage intake of meals  3. Document intake of all meals as % taken in ADL's to provide interdisciplinary communication across all shifts.   4. Monitor weight.  5. Nutrition rep will continue to see patient for ongoing meal and snack preferences.     RD monitoring.    "

## 2018-10-13 NOTE — ASSESSMENT & PLAN NOTE
-Likely multifactorial in patient with diverticulitis as well as significant vomiting  -Cont on IV fluids as well as IV Rocephin and Flagyl  -trended down

## 2018-10-13 NOTE — H&P
Hospital Medicine History & Physical Note    Date of Service  10/12/2018    Primary Care Physician  Andrea Machado P.A.-C.    Consultants  None    Code Status  Full    Chief Complaint  Nausea and vomiting    History of Presenting Illness  43 y.o. male who presented 10/12/2018 with intractable nausea and vomiting.  Patient states it started up again yesterday, has continued to worsen and now is unable to keep anything down.  Patient has had multiple recent hospital stays for both the diverticulitis as well as intractable nausea vomiting.  Patient does follow with GI, no known cause of his cyclic vomiting syndrome.  He has been diagnosed with gastroparesis but Reglan has not assisted.  Patient has been unable to keep down his antibiotics and therefore continues to have left lower quadrant tenderness.  Patient does occasionally smoke marijuana but states he has not smoked any since discharge.    Review of Systems  Review of Systems   Constitutional: Positive for chills, fever and malaise/fatigue.   HENT: Negative for congestion.    Respiratory: Negative for cough, sputum production, shortness of breath and stridor.    Cardiovascular: Negative for chest pain, palpitations and leg swelling.   Gastrointestinal: Positive for abdominal pain, diarrhea, nausea and vomiting. Negative for constipation.   Genitourinary: Negative for dysuria and urgency.   Musculoskeletal: Negative for falls and myalgias.   Neurological: Negative for dizziness, tingling, loss of consciousness, weakness and headaches.   Psychiatric/Behavioral: Negative for depression and suicidal ideas.   All other systems reviewed and are negative.      Past Medical History   has a past medical history of Bronchitis; Chickenpox; GERD (gastroesophageal reflux disease); IBS (irritable bowel syndrome); Indigestion; Influenza; Pneumonia; Pulmonary embolism (HCC); Renal disorder; and Sleep apnea.    Surgical History   has a past surgical history that includes asher  by laparoscopy; tonsillectomy; other orthopedic surgery; knee replacement, total (Left, 10/2017); and tonsillectomy.     Family History  family history includes Heart Disease in his father, paternal grandfather, and paternal uncle; Hypertension in his father; Other in his mother; Sleep Apnea in his father and mother.     Social History   reports that he has been smoking Cigarettes.  He has a 11.00 pack-year smoking history. He has never used smokeless tobacco. He reports that he uses drugs, including Inhaled. He reports that he does not drink alcohol.    Allergies  Allergies   Allergen Reactions   • Fish Anaphylaxis     [place] and tuna   • Barley Grass Anaphylaxis   • Green Beans Shortness of Breath and Nausea   • Nicoderm [Nicotine] Rash, Itching and Swelling     Blisters noted to patch site   • Turkey Shortness of Breath and Nausea       Medications  Prior to Admission Medications   Prescriptions Last Dose Informant Patient Reported? Taking?   acyclovir (ZOVIRAX) 400 MG tablet 10/11/2018 at 1400 Significant Other No No   Sig: Take 1 Tab by mouth 3 times a day.   amitriptyline (ELAVIL) 100 MG Tab 10/11/2018 at PM Significant Other Yes No   Sig: Take 100 mg by mouth every evening.   cefdinir (OMNICEF) 300 MG Cap 10/11/2018 at AM Significant Other No No   Sig: Take 1 Cap by mouth 2 times a day.   metroNIDAZOLE (FLAGYL) 500 MG Tab 10/11/2018 at 1400 Significant Other No No   Sig: Take 1 Tab by mouth every 8 hours.   ondansetron (ZOFRAN ODT) 4 MG TABLET DISPERSIBLE 10/12/2018 at AM Significant Other Yes Yes   Sig: Take 4 mg by mouth every 6 hours as needed for Nausea.   oxyCODONE immediate-release (ROXICODONE) 5 MG Tab 10/9/2018 at PM Significant Other No No   Sig: Take 1 Tab by mouth every four hours as needed for Severe Pain for up to 4 days.   pantoprazole (PROTONIX) 40 MG Tablet Delayed Response 10/11/2018 at AM Significant Other Yes No   Sig: Take 40 mg by mouth 2 times a day.   promethazine (PHENERGAN) 25 MG Tab  10/12/2018 at AM Significant Other No No   Sig: Take 1 Tab by mouth every 6 hours as needed for Nausea/Vomiting.   raNITidine (ZANTAC) 150 MG Tab 10/11/2018 at AM Significant Other No No   Sig: TAKE ONE TABLET BY MOUTH TWICE DAILY   simvastatin (ZOCOR) 10 MG Tab 10/11/2018 at PM Significant Other Yes No   Sig: Take 10 mg by mouth every evening.      Facility-Administered Medications: None       Physical Exam  Temp:  [36.7 °C (98 °F)] 36.7 °C (98 °F)  Pulse:  [] 95  Resp:  [16-18] 18  BP: (151)/(105) 151/105    Physical Exam   Constitutional: He is oriented to person, place, and time. He appears well-developed.  Non-toxic appearance. No distress.   HENT:   Head: Normocephalic and atraumatic.   Mouth/Throat: Mucous membranes are dry. No oropharyngeal exudate.   Eyes: Right eye exhibits no discharge. Left eye exhibits no discharge.   Neck: Neck supple. No tracheal deviation, no edema and no erythema present.   Cardiovascular: Normal rate and regular rhythm.  Exam reveals no gallop and no friction rub.    No murmur heard.  Pulmonary/Chest: Effort normal and breath sounds normal. No stridor. No respiratory distress. He has no wheezes. He has no rales. He exhibits no tenderness.   Abdominal: Soft. Bowel sounds are normal. He exhibits no distension. There is tenderness. There is no rebound and no guarding.   Musculoskeletal: Normal range of motion. He exhibits no edema or tenderness.   Lymphadenopathy:     He has no cervical adenopathy.   Neurological: He is alert and oriented to person, place, and time. No cranial nerve deficit.   Skin: Skin is warm and dry. No rash noted. He is not diaphoretic. No erythema.   Psychiatric: He has a normal mood and affect. His behavior is normal. Judgment and thought content normal.   Nursing note and vitals reviewed.      Laboratory:  Recent Labs      10/12/18   1305   WBC  15.4*   RBC  5.55   HEMOGLOBIN  16.2   HEMATOCRIT  46.4   MCV  83.6   MCH  29.2   MCHC  34.9   RDW  37.7    PLATELETCT  209   MPV  10.6     Recent Labs      10/12/18   1305   SODIUM  137   POTASSIUM  4.6   CHLORIDE  104   CO2  21   GLUCOSE  131*   BUN  15   CREATININE  1.07   CALCIUM  10.6*     Recent Labs      10/12/18   1305   ALTSGPT  67*   ASTSGOT  38   ALKPHOSPHAT  98   TBILIRUBIN  0.6   LIPASE  23   GLUCOSE  131*                 No results for input(s): TROPONINI in the last 72 hours.    Urinalysis:    No results found     Imaging:  CT-ABDOMEN-PELVIS WITH   Final Result      1.  No evidence of bowel obstruction or focal inflammatory change.      2.  Fatty liver.      3.  Small hiatal hernia.      4.  Prior cholecystectomy.      5.  Patchy atelectasis within the lung bases.      DX-CHEST-PORTABLE (1 VIEW)   Final Result      No evidence of acute cardiopulmonary process.            Assessment/Plan:  I anticipate this patient will require at least two midnights for appropriate medical management, necessitating inpatient admission.    Acute diverticulitis- (present on admission)   Assessment & Plan    -Has had it for a while but is been unable to keep down his antibiotics, left lower quadrant pain persists-CT scan did not show anything obvious but at this point in time I think it best to restart his IV antibiotics and only give a clear liquid diet  -Depending on how long the patient will need to be hospitalized, he should be finished with antibiotics at the time of discharge  -Also complaining of diverticulitis, more likely due to inflammatory change from diverticulitis, unlikely C. difficile, no colitis noted on imaging          Cyclical vomiting syndrome- (present on admission)   Assessment & Plan    -Profound upon arrival, slightly improved now  -Give multiple medications for nausea  -Continue outpatient follow-up with GI        Dehydration   Assessment & Plan    -Due to intractable nausea and vomiting  -Start IV fluids        Elevated lactic acid level   Assessment & Plan    -Likely more due to dehydration then  diverticulitis  -Start IV fluids  -Trend lactic acid        Gastroesophageal reflux disease with esophagitis- (present on admission)   Assessment & Plan    -Continue home meds        Leukocytosis- (present on admission)   Assessment & Plan    -Likely multifactorial in patient with diverticulitis as well as significant vomiting  -Give IV fluids as well as IV Rocephin and Flagyl  -repeat CBC in the morning            VTE prophylaxis: SCDs

## 2018-10-13 NOTE — PROGRESS NOTES
Pt awake , alert and oriented. Pt states pain and nausea have improved. Pt states he is passing gas, n nausea at this time, abdomen is tender, declines pain medication at this time. POC discussed, safety measures in place.

## 2018-10-13 NOTE — CARE PLAN
Problem: Safety  Goal: Will remain free from falls  Outcome: MET Date Met: 10/13/18  Pt educated about falls, bed alarm on, pt has made no attempts to exit bed without staff.     Problem: Bowel/Gastric:  Goal: Will not experience complications related to bowel motility  Outcome: PROGRESSING SLOWER THAN EXPECTED  Pt vomited after arrival to unit and after antiemetics administered. Pt reported abd pain and nausea to this nurse, ativan administered, see MAR. Pt in bed with eyes closed since administration. No s/sx of distress or discomfort.

## 2018-10-14 LAB
ANION GAP SERPL CALC-SCNC: 4 MMOL/L (ref 0–11.9)
BUN SERPL-MCNC: 8 MG/DL (ref 8–22)
CALCIUM SERPL-MCNC: 8.4 MG/DL (ref 8.4–10.2)
CHLORIDE SERPL-SCNC: 110 MMOL/L (ref 96–112)
CO2 SERPL-SCNC: 21 MMOL/L (ref 20–33)
CREAT SERPL-MCNC: 0.87 MG/DL (ref 0.5–1.4)
ERYTHROCYTE [DISTWIDTH] IN BLOOD BY AUTOMATED COUNT: 41.2 FL (ref 35.9–50)
GLUCOSE SERPL-MCNC: 86 MG/DL (ref 65–99)
HCT VFR BLD AUTO: 37 % (ref 42–52)
HGB BLD-MCNC: 12.3 G/DL (ref 14–18)
MCH RBC QN AUTO: 29.2 PG (ref 27–33)
MCHC RBC AUTO-ENTMCNC: 33.2 G/DL (ref 33.7–35.3)
MCV RBC AUTO: 87.9 FL (ref 81.4–97.8)
PLATELET # BLD AUTO: 60 K/UL (ref 164–446)
PMV BLD AUTO: 10.1 FL (ref 9–12.9)
POTASSIUM SERPL-SCNC: 4.2 MMOL/L (ref 3.6–5.5)
RBC # BLD AUTO: 4.21 M/UL (ref 4.7–6.1)
SODIUM SERPL-SCNC: 135 MMOL/L (ref 135–145)
WBC # BLD AUTO: 7.8 K/UL (ref 4.8–10.8)

## 2018-10-14 PROCEDURE — A9270 NON-COVERED ITEM OR SERVICE: HCPCS | Performed by: INTERNAL MEDICINE

## 2018-10-14 PROCEDURE — 700102 HCHG RX REV CODE 250 W/ 637 OVERRIDE(OP): Performed by: INTERNAL MEDICINE

## 2018-10-14 PROCEDURE — 99232 SBSQ HOSP IP/OBS MODERATE 35: CPT | Performed by: INTERNAL MEDICINE

## 2018-10-14 PROCEDURE — 700105 HCHG RX REV CODE 258: Performed by: INTERNAL MEDICINE

## 2018-10-14 PROCEDURE — 36415 COLL VENOUS BLD VENIPUNCTURE: CPT

## 2018-10-14 PROCEDURE — 700101 HCHG RX REV CODE 250: Performed by: INTERNAL MEDICINE

## 2018-10-14 PROCEDURE — 700111 HCHG RX REV CODE 636 W/ 250 OVERRIDE (IP): Performed by: INTERNAL MEDICINE

## 2018-10-14 PROCEDURE — 80048 BASIC METABOLIC PNL TOTAL CA: CPT

## 2018-10-14 PROCEDURE — 770006 HCHG ROOM/CARE - MED/SURG/GYN SEMI*

## 2018-10-14 PROCEDURE — 85027 COMPLETE CBC AUTOMATED: CPT

## 2018-10-14 RX ORDER — METRONIDAZOLE 500 MG/1
500 TABLET ORAL EVERY 8 HOURS
Status: DISCONTINUED | OUTPATIENT
Start: 2018-10-14 | End: 2018-10-16

## 2018-10-14 RX ORDER — OXYCODONE HYDROCHLORIDE 10 MG/1
10 TABLET ORAL EVERY 4 HOURS PRN
Status: DISCONTINUED | OUTPATIENT
Start: 2018-10-14 | End: 2018-10-16

## 2018-10-14 RX ADMIN — OXYCODONE HYDROCHLORIDE 5 MG: 5 TABLET ORAL at 08:15

## 2018-10-14 RX ADMIN — ONDANSETRON 4 MG: 2 INJECTION INTRAMUSCULAR; INTRAVENOUS at 20:32

## 2018-10-14 RX ADMIN — POTASSIUM CHLORIDE AND SODIUM CHLORIDE: 900; 150 INJECTION, SOLUTION INTRAVENOUS at 05:26

## 2018-10-14 RX ADMIN — FAMOTIDINE 20 MG: 20 TABLET ORAL at 07:24

## 2018-10-14 RX ADMIN — AMITRIPTYLINE HYDROCHLORIDE 100 MG: 50 TABLET, FILM COATED ORAL at 20:32

## 2018-10-14 RX ADMIN — CEFTRIAXONE SODIUM 2 G: 2 INJECTION, POWDER, FOR SOLUTION INTRAMUSCULAR; INTRAVENOUS at 07:24

## 2018-10-14 RX ADMIN — PROMETHAZINE HYDROCHLORIDE 25 MG: 25 SUPPOSITORY RECTAL at 17:45

## 2018-10-14 RX ADMIN — METRONIDAZOLE 500 MG: 500 INJECTION, SOLUTION INTRAVENOUS at 13:48

## 2018-10-14 RX ADMIN — ONDANSETRON 4 MG: 2 INJECTION INTRAMUSCULAR; INTRAVENOUS at 16:47

## 2018-10-14 RX ADMIN — METRONIDAZOLE 500 MG: 500 TABLET ORAL at 20:32

## 2018-10-14 RX ADMIN — POTASSIUM CHLORIDE AND SODIUM CHLORIDE: 900; 150 INJECTION, SOLUTION INTRAVENOUS at 16:55

## 2018-10-14 RX ADMIN — ACYCLOVIR 400 MG: 200 CAPSULE ORAL at 07:24

## 2018-10-14 RX ADMIN — METRONIDAZOLE 500 MG: 500 INJECTION, SOLUTION INTRAVENOUS at 08:10

## 2018-10-14 RX ADMIN — ONDANSETRON 4 MG: 2 INJECTION INTRAMUSCULAR; INTRAVENOUS at 09:28

## 2018-10-14 RX ADMIN — SIMVASTATIN 10 MG: 10 TABLET, FILM COATED ORAL at 20:32

## 2018-10-14 RX ADMIN — OMEPRAZOLE 20 MG: 20 CAPSULE, DELAYED RELEASE ORAL at 07:24

## 2018-10-14 RX ADMIN — OXYCODONE HYDROCHLORIDE 10 MG: 10 TABLET ORAL at 17:41

## 2018-10-14 RX ADMIN — ACYCLOVIR 400 MG: 200 CAPSULE ORAL at 13:48

## 2018-10-14 RX ADMIN — OXYCODONE HYDROCHLORIDE 10 MG: 10 TABLET ORAL at 21:43

## 2018-10-14 ASSESSMENT — ENCOUNTER SYMPTOMS
PALPITATIONS: 0
MYALGIAS: 0
TINGLING: 0
HEADACHES: 0
SHORTNESS OF BREATH: 0
FEVER: 0
COUGH: 0
NAUSEA: 1
CHILLS: 0
VOMITING: 1
ABDOMINAL PAIN: 1

## 2018-10-14 ASSESSMENT — PAIN SCALES - GENERAL
PAINLEVEL_OUTOF10: 5
PAINLEVEL_OUTOF10: 0
PAINLEVEL_OUTOF10: 7
PAINLEVEL_OUTOF10: 0
PAINLEVEL_OUTOF10: 5

## 2018-10-14 NOTE — PROGRESS NOTES
[facility]    Date of Service: 10/14/2018    Chief Complaint  43 y.o. male admitted 10/12/2018 with abdominal pain diverticulitis     Interval Problem Update  Still with abdominal pain. Nausea and vomiting has improved   No acute events overnight, afebrile.     Consultants/Specialty  None     Disposition  Home when medically cleared         Review of Systems   Constitutional: Negative for chills and fever.   HENT: Negative for congestion and ear pain.    Respiratory: Negative for cough and shortness of breath.    Cardiovascular: Negative for chest pain and palpitations.   Gastrointestinal: Positive for abdominal pain, nausea and vomiting.   Genitourinary: Negative for dysuria and urgency.   Musculoskeletal: Negative for joint pain and myalgias.   Neurological: Negative for tingling and headaches.      Physical Exam  Laboratory/Imaging   Hemodynamics  Temp (24hrs), Av.6 °C (97.9 °F), Min:36.3 °C (97.4 °F), Max:36.8 °C (98.3 °F)   Temperature: 36.6 °C (97.9 °F)  Pulse  Av.7  Min: 67  Max: 102    Blood Pressure: 117/93      Respiratory      Respiration: 16, Pulse Oximetry: 96 %        RUL Breath Sounds: Clear, RML Breath Sounds: Clear, RLL Breath Sounds: Diminished, JEFFERY Breath Sounds: Clear, LLL Breath Sounds: Diminished    Fluids    Intake/Output Summary (Last 24 hours) at 10/14/18 1220  Last data filed at 10/14/18 0818   Gross per 24 hour   Intake             2421 ml   Output             3350 ml   Net             -929 ml       Nutrition  Orders Placed This Encounter   Procedures   • Diet Order Full Liquid (advance as tolerated to regular)     Standing Status:   Standing     Number of Occurrences:   1     Order Specific Question:   Diet:     Answer:   Full Liquid [11]     Comments:   advance as tolerated to regular     Physical Exam   Constitutional: He is oriented to person, place, and time.   HENT:   Head: Normocephalic and atraumatic.   Eyes: Conjunctivae are normal. No scleral  icterus.   Neck: Neck supple. No JVD present.   Cardiovascular: Normal rate.  Exam reveals no gallop.    Pulmonary/Chest: He has no wheezes.   Abdominal: Soft. Bowel sounds are normal. He exhibits no distension. There is tenderness. There is no rebound and no guarding.   Musculoskeletal: He exhibits no edema.   Neurological: He is alert and oriented to person, place, and time.   Skin: Skin is warm and dry. No erythema.   Nursing note and vitals reviewed.      Recent Labs      10/12/18   1305  10/13/18   0023  10/14/18   0353   WBC  15.4*  13.8*  7.8   RBC  5.55  4.43*  4.21*   HEMOGLOBIN  16.2  13.1*  12.3*   HEMATOCRIT  46.4  37.7*  37.0*   MCV  83.6  85.1  87.9   MCH  29.2  29.6  29.2   MCHC  34.9  34.7  33.2*   RDW  37.7  39.4  41.2   PLATELETCT  209  94*  60*   MPV  10.6  10.2  10.1     Recent Labs      10/12/18   1305  10/13/18   0023  10/14/18   0353   SODIUM  137  136  135   POTASSIUM  4.6  3.3*  4.2   CHLORIDE  104  108  110   CO2  21  23  21   GLUCOSE  131*  94  86   BUN  15  12  8   CREATININE  1.07  0.94  0.87   CALCIUM  10.6*  8.5  8.4                      Assessment/Plan     Acute diverticulitis- (present on admission)   Assessment & Plan    -Has had it for a while but is been unable to keep down his antibiotics, left lower quadrant pain persists-CT scan did not show anything obvious but at this point in time I think it best to restart his IV antibiotics and only give a clear liquid diet  -Depending on how long the patient will need to be hospitalized, he should be finished with antibiotics at the time of discharge  -monitor           Cyclical vomiting syndrome- (present on admission)   Assessment & Plan    -improving slowly   -cont on IVF and antiemetics   -Continue outpatient follow-up with GI        Dehydration   Assessment & Plan    -Due to intractable nausea and vomiting  -cont on IVF         Elevated lactic acid level   Assessment & Plan    -Likely more due to dehydration then  diverticulitis  -trended down with IVF         Gastroesophageal reflux disease with esophagitis- (present on admission)   Assessment & Plan    -Continue home meds        Leukocytosis- (present on admission)   Assessment & Plan    -Likely multifactorial in patient with diverticulitis as well as significant vomiting  -Cont on IV fluids as well as IV Rocephin and Flagyl  -trended down           Quality-Core Measures   Reviewed items::  Labs reviewed, Radiology images reviewed and Medications reviewed  Li catheter::  No Li  Antibiotics:  Treating active infection/contamination beyond 24 hours perioperative coverage

## 2018-10-14 NOTE — PROGRESS NOTES
Pt is Aox4, vss, reports 4/10 pain to left lower abdomen cramping sensation, medicated per mar.   Pt is able to tolerate full liquid diet at this time and reports readiness to try a gi soft diet for breakfast.  Bowel sounds present.    Updated with plan of care, questions answered. Will continue to monitor.

## 2018-10-14 NOTE — PROGRESS NOTES
Pt resting in bed. No signs of distress noted. VSS. RR even and unlabored. IV fluids infusing. Call light in place. Will monitor.

## 2018-10-14 NOTE — CARE PLAN
Problem: Safety  Goal: Will remain free from injury  Outcome: PROGRESSING AS EXPECTED  Bed locked and low, controls on call light button and personal belongings within reach. Calls appropriately, will continue to monitor.    Problem: Infection  Goal: Will remain free from infection  Outcome: PROGRESSING AS EXPECTED  Remains afebrile, tolerating iv abx    Problem: Bowel/Gastric:  Goal: Normal bowel function is maintained or improved  Outcome: PROGRESSING AS EXPECTED   10/13/18 2035   OTHER   Last BM 10/11/18  (per pt)     Bowel sounds present, tolerating full liquid diet. Pt reports readiness to advance diet to a GI soft for breakfast.  Pain managed and medicated per MAR, will continue to monitor.

## 2018-10-14 NOTE — PROGRESS NOTES
Pt having 7/10 pain in right flank. Refused PO Oxy. Called Dr. Hallman and Oxy increased to 10 mg, no IV meds.

## 2018-10-15 LAB
ANION GAP SERPL CALC-SCNC: 5 MMOL/L (ref 0–11.9)
BACTERIA UR CULT: NORMAL
BUN SERPL-MCNC: 7 MG/DL (ref 8–22)
CALCIUM SERPL-MCNC: 8.4 MG/DL (ref 8.4–10.2)
CHLORIDE SERPL-SCNC: 109 MMOL/L (ref 96–112)
CO2 SERPL-SCNC: 21 MMOL/L (ref 20–33)
CREAT SERPL-MCNC: 0.89 MG/DL (ref 0.5–1.4)
ERYTHROCYTE [DISTWIDTH] IN BLOOD BY AUTOMATED COUNT: 40.1 FL (ref 35.9–50)
GLUCOSE SERPL-MCNC: 86 MG/DL (ref 65–99)
HCT VFR BLD AUTO: 36 % (ref 42–52)
HGB BLD-MCNC: 12.1 G/DL (ref 14–18)
MCH RBC QN AUTO: 29.2 PG (ref 27–33)
MCHC RBC AUTO-ENTMCNC: 33.6 G/DL (ref 33.7–35.3)
MCV RBC AUTO: 86.7 FL (ref 81.4–97.8)
PLATELET # BLD AUTO: 94 K/UL (ref 164–446)
PMV BLD AUTO: 9.4 FL (ref 9–12.9)
POTASSIUM SERPL-SCNC: 4 MMOL/L (ref 3.6–5.5)
RBC # BLD AUTO: 4.15 M/UL (ref 4.7–6.1)
SIGNIFICANT IND 70042: NORMAL
SITE SITE: NORMAL
SODIUM SERPL-SCNC: 135 MMOL/L (ref 135–145)
SOURCE SOURCE: NORMAL
WBC # BLD AUTO: 7.6 K/UL (ref 4.8–10.8)

## 2018-10-15 PROCEDURE — 700111 HCHG RX REV CODE 636 W/ 250 OVERRIDE (IP): Performed by: INTERNAL MEDICINE

## 2018-10-15 PROCEDURE — 700105 HCHG RX REV CODE 258: Performed by: INTERNAL MEDICINE

## 2018-10-15 PROCEDURE — 80048 BASIC METABOLIC PNL TOTAL CA: CPT

## 2018-10-15 PROCEDURE — 85027 COMPLETE CBC AUTOMATED: CPT

## 2018-10-15 PROCEDURE — A9270 NON-COVERED ITEM OR SERVICE: HCPCS | Performed by: INTERNAL MEDICINE

## 2018-10-15 PROCEDURE — 770006 HCHG ROOM/CARE - MED/SURG/GYN SEMI*

## 2018-10-15 PROCEDURE — 36415 COLL VENOUS BLD VENIPUNCTURE: CPT

## 2018-10-15 PROCEDURE — 99232 SBSQ HOSP IP/OBS MODERATE 35: CPT | Performed by: INTERNAL MEDICINE

## 2018-10-15 PROCEDURE — 700102 HCHG RX REV CODE 250 W/ 637 OVERRIDE(OP): Performed by: INTERNAL MEDICINE

## 2018-10-15 PROCEDURE — 700101 HCHG RX REV CODE 250: Performed by: INTERNAL MEDICINE

## 2018-10-15 RX ADMIN — OXYCODONE HYDROCHLORIDE 10 MG: 10 TABLET ORAL at 17:35

## 2018-10-15 RX ADMIN — METRONIDAZOLE 500 MG: 500 TABLET ORAL at 06:10

## 2018-10-15 RX ADMIN — POTASSIUM CHLORIDE AND SODIUM CHLORIDE: 900; 150 INJECTION, SOLUTION INTRAVENOUS at 18:49

## 2018-10-15 RX ADMIN — OMEPRAZOLE 20 MG: 20 CAPSULE, DELAYED RELEASE ORAL at 17:34

## 2018-10-15 RX ADMIN — OXYCODONE HYDROCHLORIDE 10 MG: 10 TABLET ORAL at 06:14

## 2018-10-15 RX ADMIN — SIMVASTATIN 10 MG: 10 TABLET, FILM COATED ORAL at 22:13

## 2018-10-15 RX ADMIN — POTASSIUM CHLORIDE AND SODIUM CHLORIDE: 900; 150 INJECTION, SOLUTION INTRAVENOUS at 10:42

## 2018-10-15 RX ADMIN — OMEPRAZOLE 20 MG: 20 CAPSULE, DELAYED RELEASE ORAL at 06:10

## 2018-10-15 RX ADMIN — SENNOSIDES AND DOCUSATE SODIUM 2 TABLET: 8.6; 5 TABLET ORAL at 17:35

## 2018-10-15 RX ADMIN — OXYCODONE HYDROCHLORIDE 10 MG: 10 TABLET ORAL at 22:13

## 2018-10-15 RX ADMIN — CEFTRIAXONE SODIUM 2 G: 2 INJECTION, POWDER, FOR SOLUTION INTRAMUSCULAR; INTRAVENOUS at 06:10

## 2018-10-15 RX ADMIN — AMITRIPTYLINE HYDROCHLORIDE 100 MG: 50 TABLET, FILM COATED ORAL at 22:13

## 2018-10-15 RX ADMIN — ACYCLOVIR 400 MG: 200 CAPSULE ORAL at 13:22

## 2018-10-15 RX ADMIN — ACYCLOVIR 400 MG: 200 CAPSULE ORAL at 06:10

## 2018-10-15 RX ADMIN — METRONIDAZOLE 500 MG: 500 TABLET ORAL at 13:22

## 2018-10-15 RX ADMIN — OXYCODONE HYDROCHLORIDE 10 MG: 10 TABLET ORAL at 10:38

## 2018-10-15 RX ADMIN — METRONIDAZOLE 500 MG: 500 TABLET ORAL at 22:13

## 2018-10-15 RX ADMIN — PROCHLORPERAZINE EDISYLATE 10 MG: 5 INJECTION INTRAMUSCULAR; INTRAVENOUS at 22:12

## 2018-10-15 RX ADMIN — ACYCLOVIR 400 MG: 200 CAPSULE ORAL at 17:35

## 2018-10-15 RX ADMIN — ONDANSETRON 4 MG: 2 INJECTION INTRAMUSCULAR; INTRAVENOUS at 19:42

## 2018-10-15 ASSESSMENT — ENCOUNTER SYMPTOMS
VOMITING: 1
HEADACHES: 0
ABDOMINAL PAIN: 1
FEVER: 0
TINGLING: 0
MYALGIAS: 0
SHORTNESS OF BREATH: 0
COUGH: 0
CHILLS: 0
PALPITATIONS: 0
NAUSEA: 1

## 2018-10-15 ASSESSMENT — PAIN SCALES - GENERAL
PAINLEVEL_OUTOF10: 5
PAINLEVEL_OUTOF10: 6
PAINLEVEL_OUTOF10: 0
PAINLEVEL_OUTOF10: 6
PAINLEVEL_OUTOF10: 6

## 2018-10-15 NOTE — PROGRESS NOTES
Received report from day RN.   Pt is AOx4., VSS slightly tachycardic.   W/ pain to anterior abdomen; sharp tender to upper mid which intensifies when pressed on, constant cramping to lower abdomen. Abdomen is distended and firm. Reports having a bowel movement earlier today. W/ some nausea. Pt is tolerating clear liquids.  Heat pack provided for comfort, medicated for nausea control.  Updated with plan of care.  Bed locked and low, controls on call light button within reach.  Will continue to monitor.

## 2018-10-15 NOTE — PROGRESS NOTES
Pt in bed. Awake and alert. No signs of distress noted. VSS. ABD softer, pt states he feels better. BS+, no flatus this AM. CLD tolerable. Pain and nausea better. Discussed POC.

## 2018-10-15 NOTE — CARE PLAN
Problem: Infection  Goal: Will remain free from infection  Outcome: PROGRESSING AS EXPECTED  Remains afebrile, antibiotics now oral.    Problem: Bowel/Gastric:  Goal: Normal bowel function is maintained or improved  Outcome: PROGRESSING SLOWER THAN EXPECTED  Pt downgraded to clear liquid diet after not being able to tolerate a GI soft for breakfast today.  Tolerating clear liquids for now.  Bowel sounds are distant and hypoactive.    Problem: Pain Management  Goal: Pain level will decrease to patient's comfort goal  Outcome: PROGRESSING AS EXPECTED   10/14/18 3324   OTHER   Pain Scale 0 - 10  5   Non Verbal Scale  Calm;Unlabored Breathing   Comfort Goal Comfort at Rest;Comfort with Movement;Perform Activity;Sleep Comfortably

## 2018-10-15 NOTE — PROGRESS NOTES
[facility]    Date of Service: 10/15/2018    Chief Complaint  43 y.o. male admitted 10/12/2018 with abdominal pain diverticulitis     Interval Problem Update  Pt seen ad examined, still with abdominal pain tried to advance diet to soft GI , but unable to tolerate, so cont on full liquid, cont on IV abx  No acute events overnight, afebrile.     Consultants/Specialty  None     Disposition  Home when medically cleared         Review of Systems   Constitutional: Negative for chills and fever.   HENT: Negative for congestion and ear pain.    Respiratory: Negative for cough and shortness of breath.    Cardiovascular: Negative for chest pain and palpitations.   Gastrointestinal: Positive for abdominal pain, nausea and vomiting.   Genitourinary: Negative for dysuria and urgency.   Musculoskeletal: Negative for joint pain and myalgias.   Neurological: Negative for tingling and headaches.      Physical Exam  Laboratory/Imaging   Hemodynamics  Temp (24hrs), Av.6 °C (97.8 °F), Min:36.4 °C (97.5 °F), Max:36.9 °C (98.4 °F)   Temperature: 36.8 °C (98.2 °F)  Pulse  Av.2  Min: 67  Max: 103    Blood Pressure: 115/84      Respiratory      Respiration: 18, Pulse Oximetry: 96 %        RUL Breath Sounds: Clear, RML Breath Sounds: Clear, RLL Breath Sounds: Diminished, JEFFERY Breath Sounds: Clear, LLL Breath Sounds: Diminished    Fluids    Intake/Output Summary (Last 24 hours) at 10/15/18 1439  Last data filed at 10/15/18 0919   Gross per 24 hour   Intake             2024 ml   Output              200 ml   Net             1824 ml       Nutrition  Orders Placed This Encounter   Procedures   • Diet Order Full Liquid     Standing Status:   Standing     Number of Occurrences:   1     Order Specific Question:   Diet:     Answer:   Full Liquid [11]     Physical Exam   Constitutional: He is oriented to person, place, and time.   HENT:   Head: Normocephalic and atraumatic.   Eyes: Conjunctivae are normal. No scleral  icterus.   Neck: Neck supple. No JVD present.   Cardiovascular: Normal rate.  Exam reveals no gallop.    Pulmonary/Chest: He has no wheezes.   Abdominal: Soft. Bowel sounds are normal. He exhibits no distension. There is tenderness. There is no rebound and no guarding.   Musculoskeletal: He exhibits no edema.   Neurological: He is alert and oriented to person, place, and time.   Skin: Skin is warm and dry. No erythema.   Nursing note and vitals reviewed.      Recent Labs      10/13/18   0023  10/14/18   0353  10/15/18   0530   WBC  13.8*  7.8  7.6   RBC  4.43*  4.21*  4.15*   HEMOGLOBIN  13.1*  12.3*  12.1*   HEMATOCRIT  37.7*  37.0*  36.0*   MCV  85.1  87.9  86.7   MCH  29.6  29.2  29.2   MCHC  34.7  33.2*  33.6*   RDW  39.4  41.2  40.1   PLATELETCT  94*  60*  94*   MPV  10.2  10.1  9.4     Recent Labs      10/13/18   0023  10/14/18   0353  10/15/18   0530   SODIUM  136  135  135   POTASSIUM  3.3*  4.2  4.0   CHLORIDE  108  110  109   CO2  23  21  21   GLUCOSE  94  86  86   BUN  12  8  7*   CREATININE  0.94  0.87  0.89   CALCIUM  8.5  8.4  8.4                      Assessment/Plan     Acute diverticulitis- (present on admission)   Assessment & Plan    -Has had it for a while but is been unable to keep down his antibiotics, left lower quadrant pain persists-CT scan did not show anything obvious but at this point in time   -Cont on IV abx   -Depending on how long the patient will need to be hospitalized, he should be finished with antibiotics at the time of discharge  -monitor           Cyclical vomiting syndrome- (present on admission)   Assessment & Plan    -improving slowly   -cont on IVF and antiemetics   -Continue outpatient follow-up with GI        Dehydration   Assessment & Plan    -Due to intractable nausea and vomiting  -cont on IVF         Elevated lactic acid level   Assessment & Plan    -Likely more due to dehydration then diverticulitis  -trended down with IVF         Gastroesophageal reflux disease with  esophagitis- (present on admission)   Assessment & Plan    -Continue home meds        Leukocytosis- (present on admission)   Assessment & Plan    -Likely multifactorial in patient with diverticulitis as well as significant vomiting  -Cont on IV fluids as well as IV Rocephin and Flagyl  -trended down           Quality-Core Measures   Reviewed items::  Labs reviewed, Radiology images reviewed and Medications reviewed  Li catheter::  No Li  Antibiotics:  Treating active infection/contamination beyond 24 hours perioperative coverage

## 2018-10-16 ENCOUNTER — PATIENT OUTREACH (OUTPATIENT)
Dept: HEALTH INFORMATION MANAGEMENT | Facility: OTHER | Age: 43
End: 2018-10-16

## 2018-10-16 VITALS
WEIGHT: 190.92 LBS | TEMPERATURE: 98.3 F | OXYGEN SATURATION: 97 % | BODY MASS INDEX: 28.94 KG/M2 | SYSTOLIC BLOOD PRESSURE: 119 MMHG | DIASTOLIC BLOOD PRESSURE: 73 MMHG | RESPIRATION RATE: 18 BRPM | HEART RATE: 74 BPM | HEIGHT: 68 IN

## 2018-10-16 PROBLEM — D64.9 ANEMIA: Status: ACTIVE | Noted: 2018-10-16

## 2018-10-16 LAB
ANION GAP SERPL CALC-SCNC: 4 MMOL/L (ref 0–11.9)
BUN SERPL-MCNC: 7 MG/DL (ref 8–22)
CALCIUM SERPL-MCNC: 8.3 MG/DL (ref 8.4–10.2)
CHLORIDE SERPL-SCNC: 110 MMOL/L (ref 96–112)
CO2 SERPL-SCNC: 21 MMOL/L (ref 20–33)
CREAT SERPL-MCNC: 0.86 MG/DL (ref 0.5–1.4)
GLUCOSE SERPL-MCNC: 81 MG/DL (ref 65–99)
POTASSIUM SERPL-SCNC: 4.4 MMOL/L (ref 3.6–5.5)
SODIUM SERPL-SCNC: 135 MMOL/L (ref 135–145)

## 2018-10-16 PROCEDURE — 700105 HCHG RX REV CODE 258: Performed by: INTERNAL MEDICINE

## 2018-10-16 PROCEDURE — 700101 HCHG RX REV CODE 250: Performed by: INTERNAL MEDICINE

## 2018-10-16 PROCEDURE — 36415 COLL VENOUS BLD VENIPUNCTURE: CPT

## 2018-10-16 PROCEDURE — 700102 HCHG RX REV CODE 250 W/ 637 OVERRIDE(OP): Performed by: INTERNAL MEDICINE

## 2018-10-16 PROCEDURE — 99239 HOSP IP/OBS DSCHRG MGMT >30: CPT | Performed by: HOSPITALIST

## 2018-10-16 PROCEDURE — A9270 NON-COVERED ITEM OR SERVICE: HCPCS | Performed by: INTERNAL MEDICINE

## 2018-10-16 PROCEDURE — 80048 BASIC METABOLIC PNL TOTAL CA: CPT

## 2018-10-16 PROCEDURE — 700111 HCHG RX REV CODE 636 W/ 250 OVERRIDE (IP): Performed by: INTERNAL MEDICINE

## 2018-10-16 RX ADMIN — ONDANSETRON 4 MG: 2 INJECTION INTRAMUSCULAR; INTRAVENOUS at 05:31

## 2018-10-16 RX ADMIN — CEFTRIAXONE SODIUM 2 G: 2 INJECTION, POWDER, FOR SOLUTION INTRAMUSCULAR; INTRAVENOUS at 05:30

## 2018-10-16 RX ADMIN — OMEPRAZOLE 20 MG: 20 CAPSULE, DELAYED RELEASE ORAL at 05:31

## 2018-10-16 RX ADMIN — ACETAMINOPHEN 650 MG: 325 TABLET, FILM COATED ORAL at 12:13

## 2018-10-16 RX ADMIN — OXYCODONE HYDROCHLORIDE 10 MG: 10 TABLET ORAL at 05:31

## 2018-10-16 RX ADMIN — ACYCLOVIR 400 MG: 200 CAPSULE ORAL at 12:09

## 2018-10-16 RX ADMIN — ACYCLOVIR 400 MG: 200 CAPSULE ORAL at 05:31

## 2018-10-16 RX ADMIN — SENNOSIDES AND DOCUSATE SODIUM 2 TABLET: 8.6; 5 TABLET ORAL at 05:30

## 2018-10-16 RX ADMIN — METRONIDAZOLE 500 MG: 500 TABLET ORAL at 05:30

## 2018-10-16 RX ADMIN — POTASSIUM CHLORIDE AND SODIUM CHLORIDE: 900; 150 INJECTION, SOLUTION INTRAVENOUS at 02:56

## 2018-10-16 ASSESSMENT — PAIN SCALES - GENERAL
PAINLEVEL_OUTOF10: 4
PAINLEVEL_OUTOF10: 0

## 2018-10-16 NOTE — CARE PLAN
Problem: Nutritional:  Goal: Achieve adequate nutritional intake  Patient will consume >/= 50% of meals consistently   Outcome: PROGRESSING AS EXPECTED

## 2018-10-16 NOTE — PROGRESS NOTES
Pt resting in bed, awakens to voice. No signs of distress noted. VSS. States pain and nausea under control. Tolerated FLD well. Will monitor.

## 2018-10-16 NOTE — PROGRESS NOTES
Assumed care of pt. Laying in bed and states feeling nauseous, Zofran administered per MAR. Reassessed pt and states still feeling nauseous with mild pain to his abdomen. Medicated with compazine and oxycodone, per MAR. Pt states no other concerns at this time, will continue to monitor.

## 2018-10-16 NOTE — DISCHARGE INSTRUCTIONS
Discharge Instructions    Discharged to home by car with relative. Discharged via wheelchair, hospital escort: Yes.  Special equipment needed: Not Applicable    Be sure to schedule a follow-up appointment with your primary care doctor or any specialists as instructed.     Discharge Plan:   Diet Plan: Discussed  Activity Level: Discussed  Confirmed Follow up Appointment: Appointment Scheduled  Confirmed Symptoms Management: Discussed  Medication Reconciliation Updated: Yes  Influenza Vaccine Indication: Patient Refuses    I understand that a diet low in cholesterol, fat, and sodium is recommended for good health. Unless I have been given specific instructions below for another diet, I accept this instruction as my diet prescription.   Other diet: Diet as tolerated    Special Instructions:     Abdominal Pain, Adult  Abdominal pain can be caused by many things. Often, abdominal pain is not serious and it gets better with no treatment or by being treated at home. However, sometimes abdominal pain is serious. Your health care provider will do a medical history and a physical exam to try to determine the cause of your abdominal pain.  Follow these instructions at home:  · Take over-the-counter and prescription medicines only as told by your health care provider. Do not take a laxative unless told by your health care provider.  · Drink enough fluid to keep your urine clear or pale yellow.  · Watch your condition for any changes.  · Keep all follow-up visits as told by your health care provider. This is important.  Contact a health care provider if:  · Your abdominal pain changes or gets worse.  · You are not hungry or you lose weight without trying.  · You are constipated or have diarrhea for more than 2-3 days.  · You have pain when you urinate or have a bowel movement.  · Your abdominal pain wakes you up at night.  · Your pain gets worse with meals, after eating, or with certain foods.  · You are throwing up and cannot  keep anything down.  · You have a fever.  Get help right away if:  · Your pain does not go away as soon as your health care provider told you to expect.  · You cannot stop throwing up.  · Your pain is only in areas of the abdomen, such as the right side or the left lower portion of the abdomen.  · You have bloody or black stools, or stools that look like tar.  · You have severe pain, cramping, or bloating in your abdomen.  · You have signs of dehydration, such as:  ¨ Dark urine, very little urine, or no urine.  ¨ Cracked lips.  ¨ Dry mouth.  ¨ Sunken eyes.  ¨ Sleepiness.  ¨ Weakness.  This information is not intended to replace advice given to you by your health care provider. Make sure you discuss any questions you have with your health care provider.  Document Released: 09/27/2006 Document Revised: 07/07/2017 Document Reviewed: 05/31/2017  "[employer] Interactive Patient Education © 2017 "[employer] Inc.      · Is patient discharged on Warfarin / Coumadin?   No     Depression / Suicide Risk    As you are discharged from this [facility] facility, it is important to learn how to keep safe from harming yourself.    Recognize the warning signs:  · Abrupt changes in personality, positive or negative- including increase in energy   · Giving away possessions  · Change in eating patterns- significant weight changes-  positive or negative  · Change in sleeping patterns- unable to sleep or sleeping all the time   · Unwillingness or inability to communicate  · Depression  · Unusual sadness, discouragement and loneliness  · Talk of wanting to die  · Neglect of personal appearance   · Rebelliousness- reckless behavior  · Withdrawal from people/activities they love  · Confusion- inability to concentrate     If you or a loved one observes any of these behaviors or has concerns about self-harm, here's what you can do:  · Talk about it- your feelings and reasons for harming yourself  · Remove any means that you might use to hurt  yourself (examples: pills, rope, extension cords, firearm)  · Get professional help from the community (Mental Health, Substance Abuse, psychological counseling)  · Do not be alone:Call your Safe Contact- someone whom you trust who will be there for you.  · Call your local CRISIS HOTLINE 180-7871 or 738-227-1661  · Call your local Children's Mobile Crisis Response Team [place] (073) 205-3684 or www.[employer]  · Call the toll free National Suicide Prevention Hotlines   · National Suicide Prevention Lifeline 951-299-ZGTA (1861)  · National Hope Line Network 800-SUICIDE (179-5476)

## 2018-10-16 NOTE — CARE PLAN
Problem: Venous Thromboembolism (VTW)/Deep Vein Thrombosis (DVT) Prevention:  Goal: Patient will participate in Venous Thrombosis (VTE)/Deep Vein Thrombosis (DVT)Prevention Measures  Outcome: PROGRESSING AS EXPECTED  Pt ambulating    Problem: Respiratory:  Goal: Respiratory status will improve  Outcome: PROGRESSING AS EXPECTED  O2 Sat WNL

## 2018-10-17 ENCOUNTER — NON-PROVIDER VISIT (OUTPATIENT)
Dept: MEDICAL GROUP | Facility: MEDICAL CENTER | Age: 43
End: 2018-10-17
Payer: COMMERCIAL

## 2018-10-17 DIAGNOSIS — Z23 NEED FOR VACCINATION: ICD-10-CM

## 2018-10-17 PROCEDURE — 90686 IIV4 VACC NO PRSV 0.5 ML IM: CPT | Performed by: FAMILY MEDICINE

## 2018-10-17 PROCEDURE — 90471 IMMUNIZATION ADMIN: CPT | Performed by: FAMILY MEDICINE

## 2018-10-17 PROCEDURE — 90746 HEPB VACCINE 3 DOSE ADULT IM: CPT | Performed by: FAMILY MEDICINE

## 2018-10-17 PROCEDURE — 90472 IMMUNIZATION ADMIN EACH ADD: CPT | Performed by: FAMILY MEDICINE

## 2018-10-17 PROCEDURE — 90715 TDAP VACCINE 7 YRS/> IM: CPT | Performed by: FAMILY MEDICINE

## 2018-10-17 NOTE — PROGRESS NOTES
"Jerome Cardoza Jr. is a 43 y.o. male here for a non-provider visit for:   FLU  HEPATITIS B 1 of 3  TDAP    Reason for immunization: Annual Flu Vaccine  Immunization records indicate need for vaccine: Yes, confirmed with Epic  Minimum interval has been met for this vaccine: Yes  ABN completed: Not Indicated    Order and dose verified by: zc  VIS Dated   was given to patient: Yes  All IAC Questionnaire questions were answered \"No.\"    Patient tolerated injection and no adverse effects were observed or reported: Yes     Pt scheduled for next dose in series: Yes    "

## 2018-10-17 NOTE — DISCHARGE SUMMARY
Discharge Summary    CHIEF COMPLAINT ON ADMISSION  Chief Complaint   Patient presents with   • N/V   • Fever   • Abdominal Pain       Reason for Admission  N/V; fever; dizzy; lightheaded      Admission Date  10/12/2018    CODE STATUS  Full Code    HPI & HOSPITAL COURSE  This is a 43 y.o. male here with recurrent nausea and vomiting. He has been admitted multiple times for this in the past. He is always positive for THC and is relieved with hot showers. He was admitted, hydrated and his symptoms resolved as per his previous admission. His symptoms are almost certainly secondary to THC use. We discussed this again and he has a follow up appointment with GI in 2 days. I have asked him to discuss with them agian and to abstain from THC use. He has been unable to do this after his previous admissions. He has stated he will try again not to use this substance. An alternate diagnosis may be considered if he recurrs after staying off THC.         Therefore, he is discharged in good and stable condition to home with close outpatient follow-up.    The patient met 2-midnight criteria for an inpatient stay at the time of discharge.    Discharge Date  10/16/2018    FOLLOW UP ITEMS POST DISCHARGE  GI    DISCHARGE DIAGNOSES  Active Problems:    Marijuana intoxication, with unspecified complication (HCC) POA: Yes    Acute diverticulitis POA: Yes    Cyclical vomiting syndrome POA: Yes    Leukocytosis POA: Yes    Gastroesophageal reflux disease with esophagitis POA: Yes    Elevated lactic acid level POA: Unknown    Dehydration POA: Unknown    Anemia POA: Unknown  Resolved Problems:    * No resolved hospital problems. *      FOLLOW UP  Future Appointments  Date Time Provider Department Center   10/17/2018 10:30 AM [facility] [place]   10/23/2018 1:00 PM [facility] [place]   11/29/2018 10:00 AM [facility] None     Andrea Machado P.A.-C.  [address]  [address]  [place]  [address]  [phone]            MEDICATIONS ON DISCHARGE     Medication List      CONTINUE taking these medications      Instructions   acyclovir 400 MG tablet  Commonly known as:  ZOVIRAX   Take 1 Tab by mouth 3 times a day.  Dose:  400 mg     amitriptyline 100 MG Tabs  Commonly known as:  ELAVIL   Take 100 mg by mouth every evening.  Dose:  100 mg     cefdinir 300 MG Caps  Commonly known as:  OMNICEF   Take 1 Cap by mouth 2 times a day.  Dose:  300 mg     metroNIDAZOLE 500 MG Tabs  Commonly known as:  FLAGYL   Take 1 Tab by mouth every 8 hours.  Dose:  500 mg     pantoprazole 40 MG Tbec  Commonly known as:  PROTONIX   Take 40 mg by mouth 2 times a day.  Dose:  40 mg     promethazine 25 MG Tabs  Commonly known as:  PHENERGAN   Take 1 Tab by mouth every 6 hours as needed for Nausea/Vomiting.  Dose:  25 mg     raNITidine 150 MG Tabs  Commonly known as:  ZANTAC   Doctor's comments:  Please consider 90 day supplies to promote better adherence  TAKE ONE TABLET BY MOUTH TWICE DAILY     simvastatin 10 MG Tabs  Commonly known as:  ZOCOR   Take 10 mg by mouth every evening.  Dose:  10 mg     ZOFRAN ODT 4 MG Tbdp  Generic drug:  ondansetron   Take 4 mg by mouth every 6 hours as needed for Nausea.  Dose:  4 mg        STOP taking these medications    oxyCODONE immediate-release 5 MG Tabs  Commonly known as:  ROXICODONE            Allergies  Allergies   Allergen Reactions   • Fish Anaphylaxis     [place] and tuna   • Hops Oil Anaphylaxis   • Barley Grass Anaphylaxis   • Green Beans Shortness of Breath and Nausea   • Nicoderm [Nicotine] Rash, Itching and Swelling     Blisters noted to patch site   • Turkey Shortness of Breath and Nausea       DIET  Orders Placed This Encounter   Procedures   • Diet Order Full Liquid     Standing Status:   Standing     Number of Occurrences:   1     Order Specific Question:   Diet:     Answer:   Full Liquid [11]       ACTIVITY  As tolerated.  Weight bearing as  tolerated    CONSULTATIONS  none    PROCEDURES  none    LABORATORY  Lab Results   Component Value Date    SODIUM 135 10/16/2018    POTASSIUM 4.4 10/16/2018    CHLORIDE 110 10/16/2018    CO2 21 10/16/2018    GLUCOSE 81 10/16/2018    BUN 7 (L) 10/16/2018    CREATININE 0.86 10/16/2018        Lab Results   Component Value Date    WBC 7.6 10/15/2018    HEMOGLOBIN 12.1 (L) 10/15/2018    HEMATOCRIT 36.0 (L) 10/15/2018    PLATELETCT 94 (L) 10/15/2018        Total time of the discharge process exceeds 33 minutes.

## 2018-10-17 NOTE — PROGRESS NOTES
Pt discharged into care of family. Discussed discharge meds, activity, follow up and when to call the MD or return to the ED. Pt states understanding and asks no further questions. Ambulated to private transportation by staff.

## 2018-10-23 ENCOUNTER — OFFICE VISIT (OUTPATIENT)
Dept: MEDICAL GROUP | Facility: MEDICAL CENTER | Age: 43
End: 2018-10-23
Payer: COMMERCIAL

## 2018-10-23 VITALS
HEART RATE: 112 BPM | TEMPERATURE: 98.6 F | SYSTOLIC BLOOD PRESSURE: 139 MMHG | WEIGHT: 200 LBS | HEIGHT: 68 IN | DIASTOLIC BLOOD PRESSURE: 61 MMHG | BODY MASS INDEX: 30.31 KG/M2 | OXYGEN SATURATION: 96 %

## 2018-10-23 DIAGNOSIS — Z72.0 TOBACCO ABUSE: ICD-10-CM

## 2018-10-23 DIAGNOSIS — R00.0 SINUS TACHYCARDIA: ICD-10-CM

## 2018-10-23 DIAGNOSIS — R11.15 INTRACTABLE CYCLICAL VOMITING WITH NAUSEA: ICD-10-CM

## 2018-10-23 PROBLEM — S20.229A CONTUSION OF BACK: Status: RESOLVED | Noted: 2018-10-08 | Resolved: 2018-10-23

## 2018-10-23 PROBLEM — D64.9 ANEMIA: Status: RESOLVED | Noted: 2018-10-16 | Resolved: 2018-10-23

## 2018-10-23 PROBLEM — N17.9 ACUTE KIDNEY INJURY (HCC): Status: RESOLVED | Noted: 2018-10-06 | Resolved: 2018-10-23

## 2018-10-23 PROBLEM — R07.89 OTHER CHEST PAIN: Status: RESOLVED | Noted: 2018-05-20 | Resolved: 2018-10-23

## 2018-10-23 PROBLEM — E87.6 HYPOKALEMIA: Status: RESOLVED | Noted: 2018-10-07 | Resolved: 2018-10-23

## 2018-10-23 PROBLEM — D69.6 THROMBOCYTOPENIA (HCC): Status: RESOLVED | Noted: 2018-05-20 | Resolved: 2018-10-23

## 2018-10-23 PROBLEM — E83.52 HYPERCALCEMIA: Status: RESOLVED | Noted: 2018-10-06 | Resolved: 2018-10-23

## 2018-10-23 PROBLEM — R55 SYNCOPE: Status: RESOLVED | Noted: 2018-10-06 | Resolved: 2018-10-23

## 2018-10-23 PROBLEM — R79.89 ELEVATED TROPONIN: Status: RESOLVED | Noted: 2018-10-06 | Resolved: 2018-10-23

## 2018-10-23 PROBLEM — R05.9 COUGH: Status: RESOLVED | Noted: 2018-05-21 | Resolved: 2018-10-23

## 2018-10-23 PROBLEM — Z87.19 HISTORY OF DIVERTICULITIS: Status: ACTIVE | Noted: 2018-10-06

## 2018-10-23 PROBLEM — J20.9 ACUTE BRONCHITIS: Status: RESOLVED | Noted: 2018-04-09 | Resolved: 2018-10-23

## 2018-10-23 PROBLEM — R79.89 ELEVATED LACTIC ACID LEVEL: Status: RESOLVED | Noted: 2018-10-12 | Resolved: 2018-10-23

## 2018-10-23 PROBLEM — E86.0 DEHYDRATION: Status: RESOLVED | Noted: 2018-10-12 | Resolved: 2018-10-23

## 2018-10-23 PROCEDURE — 99214 OFFICE O/P EST MOD 30 MIN: CPT | Performed by: PHYSICIAN ASSISTANT

## 2018-10-23 RX ORDER — METOPROLOL SUCCINATE 25 MG/1
25 TABLET, EXTENDED RELEASE ORAL DAILY
Qty: 30 TAB | Refills: 3 | Status: ON HOLD | OUTPATIENT
Start: 2018-10-23 | End: 2019-03-21

## 2018-10-23 NOTE — ASSESSMENT & PLAN NOTE
This is a 43-year-old male that was hospitalized recently for nauseousness and vomiting. He's had significant improvement since the hospitalization. This cyclical vomiting syndrome has been blamed on marijuana use. He has been heavy with the use over the past few weeks. Since his discharge which is been 2 weeks he has used marijuana about 3 times. Uses Zofran and Phenergan when needed. Currently feels good without any nauseous and vomiting.    Did see his gastroenterologist 2 days after his hospitalization. Advised he needs an upper endoscopy in the distant future. Also colonoscopy in 7 years. He was told to cut back on marijuana consumption.

## 2018-10-23 NOTE — ASSESSMENT & PLAN NOTE
Chronic history of tachycardia. Denies chest pain or shortness of breath. Previously seen by Dr. Almanza who didn't provide medication but spoken his note about starting metoprolol. Also possibly ordering a Holter monitor. He thought potentially amitriptyline or Zofran were causing his tachycardia.  Medication he's been taking on a routine basis is amitriptyline.

## 2018-10-23 NOTE — PROGRESS NOTES
Subjective:   Jerome Cardoza Jr. is a 43 y.o. male here today for hospitalization follow-up secondary to cyclical vomiting syndrome, sinus tachycardia and tobacco abuse.    Cyclical vomiting syndrome  This is a 43-year-old male that was hospitalized recently for nauseousness and vomiting. He's had significant improvement since the hospitalization. This cyclical vomiting syndrome has been blamed on marijuana use. He has been heavy with the use over the past few weeks. Since his discharge which is been 2 weeks he has used marijuana about 3 times. Uses Zofran and Phenergan when needed. Currently feels good without any nauseous and vomiting.    Did see his gastroenterologist 2 days after his hospitalization. Advised he needs an upper endoscopy in the distant future. Also colonoscopy in 7 years. He was told to cut back on marijuana consumption.    Sinus tachycardia  Chronic history of tachycardia. Denies chest pain or shortness of breath. Previously seen by Dr. Almanza who didn't provide medication but spoken his note about starting metoprolol. Also possibly ordering a Holter monitor. He thought potentially amitriptyline or Zofran were causing his tachycardia.  Medication he's been taking on a routine basis is amitriptyline.    Tobacco abuse  He's been trying to cut back on his tobacco and marijuana use.       Current medicines (including changes today)  Current Outpatient Prescriptions   Medication Sig Dispense Refill   • metoprolol SR (TOPROL XL) 25 MG TABLET SR 24 HR Take 1 Tab by mouth every day. 30 Tab 3   • ondansetron (ZOFRAN ODT) 4 MG TABLET DISPERSIBLE Take 4 mg by mouth every 6 hours as needed for Nausea.     • acyclovir (ZOVIRAX) 400 MG tablet Take 1 Tab by mouth 3 times a day. 20 Tab 0   • promethazine (PHENERGAN) 25 MG Tab Take 1 Tab by mouth every 6 hours as needed for Nausea/Vomiting. 30 Tab 0   • raNITidine (ZANTAC) 150 MG Tab TAKE ONE TABLET BY MOUTH TWICE DAILY 60 Tab 3   • pantoprazole  "(PROTONIX) 40 MG Tablet Delayed Response Take 40 mg by mouth 2 times a day.     • amitriptyline (ELAVIL) 100 MG Tab Take 100 mg by mouth every evening.     • simvastatin (ZOCOR) 10 MG Tab Take 10 mg by mouth every evening.       No current facility-administered medications for this visit.      He  has a past medical history of Bronchitis; Chickenpox; GERD (gastroesophageal reflux disease); IBS (irritable bowel syndrome); Indigestion; Influenza; Pneumonia; Pulmonary embolism (HCC); Renal disorder; and Sleep apnea.    Social History and Family History were reviewed and updated.    ROS   No chest pain, no shortness of breath, no abdominal pain and all other systems were reviewed and are negative.       Objective:     Blood pressure 139/61, pulse (!) 112, temperature 37 °C (98.6 °F), height 1.727 m (5' 8\"), weight 90.7 kg (200 lb), SpO2 96 %. Body mass index is 30.41 kg/m².   Physical Exam:  Constitutional: Alert, no distress.  Skin: Warm, dry, good turgor, no rashes in visible areas.  Eye: Equal, round and reactive, conjunctiva clear, lids normal.  ENMT: Lips without lesions, good dentition, oropharynx clear.  Neck: Trachea midline, no masses.   Lymph: No cervical or supraclavicular lymphadenopathy  Respiratory: Unlabored respiratory effort, lungs clear to auscultation, no wheezes, no ronchi.  Cardiovascular: Normal S1, S2 tachycardic, no murmur, no edema.  Abdomen: Soft, non-tender, no masses.  Psych: Alert and oriented x3, normal affect and mood.        Assessment and Plan:   The following treatment plan was discussed    1. Intractable cyclical vomiting with nausea  Acute, new onset condition. In remission. Suggest to cut back significantly on marijuana use. Stop if it all possible. Continue Zofran and Phenergan as needed.    2. Sinus tachycardia  Chronic condition. He will try to wean off of amitriptyline. Wean for 4 weeks. After that no medication for one month. I did send over metoprolol to take if needed. Will " wait to see if Elavil is the culprit. Advised though that symptoms such as IBS and dyspepsia may return with Elavil stoppage. If symptoms become too concerning he may restart Elavil.  - metoprolol SR (TOPROL XL) 25 MG TABLET SR 24 HR; Take 1 Tab by mouth every day.  Dispense: 30 Tab; Refill: 3    3. Tobacco abuse  Chronic use. Urged to contact our smoking cessation line. We'll monitor. Stop marijuana use if possible.      Followup: Return in about 2 months (around 12/23/2018), or if symptoms worsen or fail to improve.    Please note that this dictation was created using voice recognition software. I have made every reasonable attempt to correct obvious errors, but I expect that there are errors of grammar and possibly content that I did not discover before finalizing the note.

## 2018-11-19 ENCOUNTER — NON-PROVIDER VISIT (OUTPATIENT)
Dept: MEDICAL GROUP | Facility: MEDICAL CENTER | Age: 43
End: 2018-11-19
Payer: COMMERCIAL

## 2018-11-19 DIAGNOSIS — Z23 NEED FOR VACCINATION: ICD-10-CM

## 2018-11-19 PROCEDURE — 90746 HEPB VACCINE 3 DOSE ADULT IM: CPT | Performed by: NURSE PRACTITIONER

## 2018-11-19 PROCEDURE — 90471 IMMUNIZATION ADMIN: CPT | Performed by: NURSE PRACTITIONER

## 2018-11-19 NOTE — NON-PROVIDER
"Jerome Cardoza Jr. is a 43 y.o. male here for a non-provider visit for:   HEPATITIS B 2 of 3    Reason for immunization: continue or complete series started at the office  Immunization records indicate need for vaccine: Yes, confirmed with Epic  Minimum interval has been met for this vaccine: Yes  ABN completed: Not Indicated    Order and dose verified by: aed  VIS Dated  7- was given to patient: Yes  All IAC Questionnaire questions were answered \"No.\"    Patient tolerated injection and no adverse effects were observed or reported: Yes    Pt scheduled for next dose in series: Yes    "

## 2018-11-28 PROBLEM — E66.9 OBESITY: Status: ACTIVE | Noted: 2018-11-28

## 2018-11-28 NOTE — PROGRESS NOTES
CC: Follow up for JEN on CPAP    HPI:    Mr. Jerome Cardoza is a 42 y/o male   who was last seen 5/13/2018  His 30 day compliance then showedusage for 87% of the days for an average of 6:13 with a residual ahi of 1.6. He was achieving great benefit from its use with more energy and less headaches. No undue issues with the mask fit, leak, or pressure tolerance. Has the humidity set on 6 which seems right for him. Median leak is 6.5 lpm    His APNEALINK was performed on 12/27/2017. The device used was an APNEALINK air type 3 portable device. The recording duration was 9 hours and 38 minutes. His respiratory event index was 11.7. He had 222 oxygen desaturations and his MARGY was 23.6. He spent 4 hours and 58 minutes with saturations less than or equal to 88%.    Today, 11/29/2018, his 60-day compliance is 17% for 4 hours and 27 minutes.  He is on auto titrating CPAP 5-12 cm H2O and has residual AHI is 6.3.  His usage is been severely compromise because of hospitalization for nausea and vomiting thought to be secondary to the use of THC.  He was hospitalized 3 times in September and October for similar issues.    Since coming home he has found that using CPAP worsens his nausea which thereby lessens his use. Still uses Phenergan for nausea mostly at night.    Significant comorbidities and modifying factors include diverticulitis, cyclical vomiting syndrome, marijuana intoxication, overweight, history of PE status post anticoagulation therapy, and tobacco use.      Patient Active Problem List    Diagnosis Date Noted   • History of diverticulitis 10/06/2018     Priority: High   • Marijuana intoxication, with unspecified complication (HCC) 05/26/2013     Priority: High   • Cyclical vomiting syndrome 09/30/2018     Priority: Medium   • Sinus tachycardia 04/09/2018     Priority: Low   • Tobacco abuse 03/23/2018     Priority: Low   • History of pulmonary embolism 01/30/2018     Priority: Low   • Dyslipidemia  01/30/2018     Priority: Low   • Gastroesophageal reflux disease with esophagitis 01/30/2018     Priority: Low   • Randle esophagus 01/30/2018     Priority: Low   • Obesity 11/28/2018   • Pulmonary HTN (HCC) 10/08/2018   • History of esophageal stricture 02/22/2018   • Family history of systemic lupus erythematosus (SLE) in mother 02/22/2018   • Obstructive sleep apnea syndrome 01/30/2018   • Family history of MI (myocardial infarction) 01/30/2018       Past Medical History:   Diagnosis Date   • Bronchitis    • Chickenpox    • GERD (gastroesophageal reflux disease)    • IBS (irritable bowel syndrome)    • Indigestion    • Influenza    • Pneumonia    • Pulmonary embolism (HCC)    • Renal disorder     Acute Renal Failure from dehydration in the past   • Sleep apnea         Past Surgical History:   Procedure Laterality Date   • KNEE REPLACEMENT, TOTAL Left 10/2017   • LENORA BY LAPAROSCOPY      Cholecystectomy, Laparoscopic   • OTHER ORTHOPEDIC SURGERY     • TONSILLECTOMY     • TONSILLECTOMY         Family History   Problem Relation Age of Onset   • Other Mother         Lupus   • Sleep Apnea Mother    • Heart Disease Father    • Hypertension Father    • Sleep Apnea Father    • Heart Disease Paternal Uncle    • Heart Disease Paternal Grandfather        Social History     Social History   • Marital status:      Spouse name: N/A   • Number of children: N/A   • Years of education: N/A     Occupational History   • Not on file.     Social History Main Topics   • Smoking status: Current Every Day Smoker     Packs/day: 0.50     Years: 22.00     Types: Cigarettes   • Smokeless tobacco: Never Used   • Alcohol use No   • Drug use: Yes     Types: Inhaled      Comment: marijuana occ   • Sexual activity: Yes     Partners: Female     Other Topics Concern   • Not on file     Social History Narrative   • No narrative on file       Current Outpatient Prescriptions   Medication Sig Dispense Refill   • metoprolol SR (TOPROL XL) 25  "MG TABLET SR 24 HR Take 1 Tab by mouth every day. (Patient not taking: Reported on 11/29/2018) 30 Tab 3   • ondansetron (ZOFRAN ODT) 4 MG TABLET DISPERSIBLE Take 4 mg by mouth every 6 hours as needed for Nausea.     • acyclovir (ZOVIRAX) 400 MG tablet Take 1 Tab by mouth 3 times a day. 20 Tab 0   • promethazine (PHENERGAN) 25 MG Tab Take 1 Tab by mouth every 6 hours as needed for Nausea/Vomiting. 30 Tab 0   • raNITidine (ZANTAC) 150 MG Tab TAKE ONE TABLET BY MOUTH TWICE DAILY 60 Tab 3   • pantoprazole (PROTONIX) 40 MG Tablet Delayed Response Take 40 mg by mouth 2 times a day.     • amitriptyline (ELAVIL) 100 MG Tab Take 100 mg by mouth every evening.     • simvastatin (ZOCOR) 10 MG Tab Take 10 mg by mouth every evening.       No current facility-administered medications for this visit.     \"CURRENT RX\"    ALLERGIES: Fish; Hops oil; Barley grass; Green beans; Nicoderm [nicotine]; and [place]    ROS    Constitutional: Denies fever, chills, sweats,  weight loss, fatigue.  Eyes: Denies vision loss, pain, drainage, double vision, glasses.  Ears/Nose/Mouth/Throat: Denies earache, rhinitis/nasal congestion, injury, recurrent sore throat, persistent hoarseness, decayed teeth/toothaches, ringing or buzzing in the ears. Difficulty hearing   Cardiovascular: Denies chest pain, tightness, palpitations, swelling in legs/feet, fainting, difficulty breathing when lying down but gets better when sitting up.   Respiratory: Positive for shortness of breath, cough, painful breathing in the past but denies sputum and wheezing. He has not any symptoms regularly.   Sleep: per HPI  Gastrointestinal: Has had cyclical vomiting during hospitalization.  He had heartburn, difficulty swallowing, nausea, and abdominal pain in February.  Genitourinary: Denies  blood in urine, discharge, frequent urination.   Musculoskeletal: Positive for painful joints and sore muscles but denies back pain. He has his shoes daily.  Integumentary: Denies rashes, " "lumps, color changes.   Neurological: Denies frequent headaches,weakness, dizziness.     PHYSICAL EXAM  Overweight but not obese    /70 (BP Location: Left arm, Patient Position: Sitting, BP Cuff Size: Adult)   Pulse 92   Resp 14   Ht 1.727 m (5' 8\")   Wt 89.4 kg (197 lb)   SpO2 95%   BMI 29.95 kg/m²   Appearance: Well-nourished, well-developed, no acute distress  Eyes:  PERRLA, EOMI  ENMT: without lesions, deformities;hearing grossly intact; tongue normal, posterior pharynx without erythema or exudate; Mallampati classification:   Neck: Supple, trachea midline, no masses  Respiratory effort:  No intercostal retractions or use of accessory muscles  Lung auscultation:  No wheezes rhonchi rubs or rales  Cardiac: No murmurs, rubs, or gallops; regular rhythm, normal rate; no edema  Abdomen:  No tenderness, no organomegaly  Musculoskeletal:  Grossly normal; gait and station normal; digits and nails normal  Skin:  No rashes, petechiae, cyanosis  Neurologic: without focal signs; oriented to person, time, place, and purpose; judgement intact  Psychiatric:  No depression, anxiety, agitation        PROBLEMS:  1. Obstructive sleep apnea syndrome      2. Tobacco abuse - 1/2 ppd - encouraged to quit but he is not ready.      3. Up to date with influenza vaccination      4. Class 1 obesity with body mass index (BMI) of 30.0 to 30.9 in adult, unspecified obesity type, unspecified whether serious comorbidity present      PLAN:     Return in about 6 months (around 5/29/2019).    Has been advised to continue the current auto titrating CPAP 5-12 cm H2O, clean equipment frequently, and get new mask and supplies as allowed by insurance and DME. Advised about potential problems including nasal obstruction/stuffiness, mask leak or discomfort , frequent awakenings, chill or dryness of the upper airway, noise, inconvenience, and lack of benefit. Recommend an earlier appointment, if significant treatment barriers develop.    He is " encouraged to use his device as much as possible but until his GI issues improve this may be challenging.

## 2018-11-29 ENCOUNTER — SLEEP CENTER VISIT (OUTPATIENT)
Dept: SLEEP MEDICINE | Facility: MEDICAL CENTER | Age: 43
End: 2018-11-29
Payer: COMMERCIAL

## 2018-11-29 VITALS
SYSTOLIC BLOOD PRESSURE: 118 MMHG | OXYGEN SATURATION: 95 % | RESPIRATION RATE: 14 BRPM | DIASTOLIC BLOOD PRESSURE: 70 MMHG | HEART RATE: 92 BPM | WEIGHT: 197 LBS | BODY MASS INDEX: 29.86 KG/M2 | HEIGHT: 68 IN

## 2018-11-29 DIAGNOSIS — Z72.0 TOBACCO ABUSE: ICD-10-CM

## 2018-11-29 DIAGNOSIS — E66.9 CLASS 1 OBESITY WITH BODY MASS INDEX (BMI) OF 30.0 TO 30.9 IN ADULT, UNSPECIFIED OBESITY TYPE, UNSPECIFIED WHETHER SERIOUS COMORBIDITY PRESENT: ICD-10-CM

## 2018-11-29 DIAGNOSIS — Z92.29 UP TO DATE WITH INFLUENZA VACCINATION: ICD-10-CM

## 2018-11-29 DIAGNOSIS — G47.33 OBSTRUCTIVE SLEEP APNEA SYNDROME: ICD-10-CM

## 2018-11-29 PROCEDURE — 99214 OFFICE O/P EST MOD 30 MIN: CPT | Performed by: INTERNAL MEDICINE

## 2018-12-03 DIAGNOSIS — E78.5 DYSLIPIDEMIA: ICD-10-CM

## 2018-12-03 RX ORDER — SIMVASTATIN 10 MG
10 TABLET ORAL EVERY EVENING
Qty: 90 TAB | Refills: 1 | Status: SHIPPED
Start: 2018-12-03 | End: 2020-01-07

## 2018-12-30 DIAGNOSIS — K21.9 GASTROESOPHAGEAL REFLUX DISEASE, ESOPHAGITIS PRESENCE NOT SPECIFIED: ICD-10-CM

## 2018-12-30 RX ORDER — RANITIDINE 150 MG/1
TABLET ORAL
Qty: 180 TAB | Refills: 1 | Status: SHIPPED
Start: 2018-12-30 | End: 2020-01-07

## 2019-03-20 ENCOUNTER — APPOINTMENT (OUTPATIENT)
Dept: RADIOLOGY | Facility: MEDICAL CENTER | Age: 44
DRG: 872 | End: 2019-03-20
Attending: EMERGENCY MEDICINE

## 2019-03-20 ENCOUNTER — HOSPITAL ENCOUNTER (INPATIENT)
Facility: MEDICAL CENTER | Age: 44
LOS: 2 days | DRG: 872 | End: 2019-03-23
Attending: EMERGENCY MEDICINE | Admitting: HOSPITALIST

## 2019-03-20 DIAGNOSIS — E86.0 DEHYDRATION: ICD-10-CM

## 2019-03-20 DIAGNOSIS — K52.9 ENTERITIS: ICD-10-CM

## 2019-03-20 DIAGNOSIS — R79.89 PRERENAL AZOTEMIA: ICD-10-CM

## 2019-03-20 DIAGNOSIS — A41.9 SEPSIS, DUE TO UNSPECIFIED ORGANISM: ICD-10-CM

## 2019-03-20 LAB
ALBUMIN SERPL BCP-MCNC: 5.5 G/DL (ref 3.2–4.9)
ALBUMIN/GLOB SERPL: 1.7 G/DL
ALP SERPL-CCNC: 87 U/L (ref 30–99)
ALT SERPL-CCNC: 38 U/L (ref 2–50)
ANION GAP SERPL CALC-SCNC: 21 MMOL/L (ref 0–11.9)
AST SERPL-CCNC: 34 U/L (ref 12–45)
BASOPHILS # BLD AUTO: 0.2 % (ref 0–1.8)
BASOPHILS # BLD: 0.04 K/UL (ref 0–0.12)
BILIRUB SERPL-MCNC: 1.7 MG/DL (ref 0.1–1.5)
BUN SERPL-MCNC: 39 MG/DL (ref 8–22)
CALCIUM SERPL-MCNC: 10 MG/DL (ref 8.4–10.2)
CHLORIDE SERPL-SCNC: 86 MMOL/L (ref 96–112)
CO2 SERPL-SCNC: 23 MMOL/L (ref 20–33)
CREAT SERPL-MCNC: 1.86 MG/DL (ref 0.5–1.4)
EKG IMPRESSION: NORMAL
EOSINOPHIL # BLD AUTO: 0.07 K/UL (ref 0–0.51)
EOSINOPHIL NFR BLD: 0.4 % (ref 0–6.9)
ERYTHROCYTE [DISTWIDTH] IN BLOOD BY AUTOMATED COUNT: 38.2 FL (ref 35.9–50)
GLOBULIN SER CALC-MCNC: 3.3 G/DL (ref 1.9–3.5)
GLUCOSE SERPL-MCNC: 133 MG/DL (ref 65–99)
HCT VFR BLD AUTO: 52.1 % (ref 42–52)
HGB BLD-MCNC: 18.2 G/DL (ref 14–18)
IMM GRANULOCYTES # BLD AUTO: 0.09 K/UL (ref 0–0.11)
IMM GRANULOCYTES NFR BLD AUTO: 0.5 % (ref 0–0.9)
LIPASE SERPL-CCNC: 65 U/L (ref 7–58)
LYMPHOCYTES # BLD AUTO: 3.33 K/UL (ref 1–4.8)
LYMPHOCYTES NFR BLD: 19.1 % (ref 22–41)
MCH RBC QN AUTO: 28.8 PG (ref 27–33)
MCHC RBC AUTO-ENTMCNC: 34.9 G/DL (ref 33.7–35.3)
MCV RBC AUTO: 82.4 FL (ref 81.4–97.8)
MONOCYTES # BLD AUTO: 1.81 K/UL (ref 0–0.85)
MONOCYTES NFR BLD AUTO: 10.4 % (ref 0–13.4)
NEUTROPHILS # BLD AUTO: 12.07 K/UL (ref 1.82–7.42)
NEUTROPHILS NFR BLD: 69.4 % (ref 44–72)
NRBC # BLD AUTO: 0 K/UL
NRBC BLD-RTO: 0 /100 WBC
PLATELET # BLD AUTO: 269 K/UL (ref 164–446)
PMV BLD AUTO: 9.5 FL (ref 9–12.9)
POTASSIUM SERPL-SCNC: 3.2 MMOL/L (ref 3.6–5.5)
PROT SERPL-MCNC: 8.8 G/DL (ref 6–8.2)
RBC # BLD AUTO: 6.32 M/UL (ref 4.7–6.1)
SODIUM SERPL-SCNC: 130 MMOL/L (ref 135–145)
TROPONIN I SERPL-MCNC: 0.02 NG/ML (ref 0–0.04)
WBC # BLD AUTO: 17.4 K/UL (ref 4.8–10.8)

## 2019-03-20 PROCEDURE — 84484 ASSAY OF TROPONIN QUANT: CPT

## 2019-03-20 PROCEDURE — 96375 TX/PRO/DX INJ NEW DRUG ADDON: CPT

## 2019-03-20 PROCEDURE — 74177 CT ABD & PELVIS W/CONTRAST: CPT

## 2019-03-20 PROCEDURE — G0378 HOSPITAL OBSERVATION PER HR: HCPCS

## 2019-03-20 PROCEDURE — 99220 PR INITIAL OBSERVATION CARE,LEVL III: CPT | Performed by: HOSPITALIST

## 2019-03-20 PROCEDURE — 80053 COMPREHEN METABOLIC PANEL: CPT

## 2019-03-20 PROCEDURE — 700111 HCHG RX REV CODE 636 W/ 250 OVERRIDE (IP): Performed by: EMERGENCY MEDICINE

## 2019-03-20 PROCEDURE — 700101 HCHG RX REV CODE 250: Performed by: EMERGENCY MEDICINE

## 2019-03-20 PROCEDURE — 71045 X-RAY EXAM CHEST 1 VIEW: CPT

## 2019-03-20 PROCEDURE — 85025 COMPLETE CBC W/AUTO DIFF WBC: CPT

## 2019-03-20 PROCEDURE — 700105 HCHG RX REV CODE 258: Performed by: EMERGENCY MEDICINE

## 2019-03-20 PROCEDURE — 99285 EMERGENCY DEPT VISIT HI MDM: CPT

## 2019-03-20 PROCEDURE — 93005 ELECTROCARDIOGRAM TRACING: CPT | Performed by: EMERGENCY MEDICINE

## 2019-03-20 PROCEDURE — 83690 ASSAY OF LIPASE: CPT

## 2019-03-20 PROCEDURE — 96365 THER/PROPH/DIAG IV INF INIT: CPT

## 2019-03-20 PROCEDURE — 36415 COLL VENOUS BLD VENIPUNCTURE: CPT

## 2019-03-20 PROCEDURE — 96361 HYDRATE IV INFUSION ADD-ON: CPT

## 2019-03-20 RX ORDER — DIPHENHYDRAMINE HYDROCHLORIDE 50 MG/ML
25 INJECTION INTRAMUSCULAR; INTRAVENOUS ONCE
Status: COMPLETED | OUTPATIENT
Start: 2019-03-21 | End: 2019-03-21

## 2019-03-20 RX ORDER — SODIUM CHLORIDE 9 MG/ML
INJECTION, SOLUTION INTRAVENOUS CONTINUOUS
Status: DISCONTINUED | OUTPATIENT
Start: 2019-03-21 | End: 2019-03-22

## 2019-03-20 RX ORDER — SODIUM CHLORIDE 9 MG/ML
1000 INJECTION, SOLUTION INTRAVENOUS ONCE
Status: COMPLETED | OUTPATIENT
Start: 2019-03-20 | End: 2019-03-20

## 2019-03-20 RX ORDER — HYDROMORPHONE HYDROCHLORIDE 1 MG/ML
1 INJECTION, SOLUTION INTRAMUSCULAR; INTRAVENOUS; SUBCUTANEOUS ONCE
Status: COMPLETED | OUTPATIENT
Start: 2019-03-20 | End: 2019-03-20

## 2019-03-20 RX ORDER — ONDANSETRON 2 MG/ML
4 INJECTION INTRAMUSCULAR; INTRAVENOUS ONCE
Status: COMPLETED | OUTPATIENT
Start: 2019-03-20 | End: 2019-03-20

## 2019-03-20 RX ORDER — METOCLOPRAMIDE HYDROCHLORIDE 5 MG/ML
10 INJECTION INTRAMUSCULAR; INTRAVENOUS ONCE
Status: COMPLETED | OUTPATIENT
Start: 2019-03-20 | End: 2019-03-20

## 2019-03-20 RX ORDER — PROMETHAZINE HYDROCHLORIDE 25 MG/1
12.5 SUPPOSITORY RECTAL EVERY 6 HOURS PRN
Status: DISCONTINUED | OUTPATIENT
Start: 2019-03-20 | End: 2019-03-23 | Stop reason: HOSPADM

## 2019-03-20 RX ADMIN — SODIUM CHLORIDE 1000 ML: 9 INJECTION, SOLUTION INTRAVENOUS at 21:26

## 2019-03-20 RX ADMIN — ONDANSETRON 4 MG: 2 INJECTION INTRAMUSCULAR; INTRAVENOUS at 21:27

## 2019-03-20 RX ADMIN — METRONIDAZOLE 500 MG: 500 INJECTION, SOLUTION INTRAVENOUS at 23:58

## 2019-03-20 RX ADMIN — SODIUM CHLORIDE 1000 ML: 9 INJECTION, SOLUTION INTRAVENOUS at 22:03

## 2019-03-20 RX ADMIN — CEFTRIAXONE SODIUM 2 G: 2 INJECTION, POWDER, FOR SOLUTION INTRAMUSCULAR; INTRAVENOUS at 23:15

## 2019-03-20 RX ADMIN — METOCLOPRAMIDE 10 MG: 5 INJECTION, SOLUTION INTRAMUSCULAR; INTRAVENOUS at 22:01

## 2019-03-20 RX ADMIN — HYDROMORPHONE HYDROCHLORIDE 1 MG: 1 INJECTION, SOLUTION INTRAMUSCULAR; INTRAVENOUS; SUBCUTANEOUS at 22:01

## 2019-03-21 PROBLEM — A41.9 SEPSIS (HCC): Status: ACTIVE | Noted: 2019-03-21

## 2019-03-21 PROBLEM — E87.6 HYPOKALEMIA: Status: ACTIVE | Noted: 2019-03-21

## 2019-03-21 PROBLEM — I10 HYPERTENSION: Status: ACTIVE | Noted: 2019-03-21

## 2019-03-21 PROBLEM — N17.9 AKI (ACUTE KIDNEY INJURY) (HCC): Status: ACTIVE | Noted: 2019-03-21

## 2019-03-21 PROBLEM — R10.9 ABDOMINAL PAIN: Status: ACTIVE | Noted: 2019-03-21

## 2019-03-21 PROBLEM — K52.9 ENTERITIS: Status: ACTIVE | Noted: 2019-03-21

## 2019-03-21 PROBLEM — R65.10 SIRS (SYSTEMIC INFLAMMATORY RESPONSE SYNDROME) (HCC): Status: ACTIVE | Noted: 2019-03-21

## 2019-03-21 LAB
ANION GAP SERPL CALC-SCNC: 14 MMOL/L (ref 0–11.9)
BASOPHILS # BLD AUTO: 0.2 % (ref 0–1.8)
BASOPHILS # BLD: 0.04 K/UL (ref 0–0.12)
BUN SERPL-MCNC: 28 MG/DL (ref 8–22)
CALCIUM SERPL-MCNC: 8.5 MG/DL (ref 8.4–10.2)
CHLORIDE SERPL-SCNC: 94 MMOL/L (ref 96–112)
CO2 SERPL-SCNC: 25 MMOL/L (ref 20–33)
CREAT SERPL-MCNC: 1.24 MG/DL (ref 0.5–1.4)
EOSINOPHIL # BLD AUTO: 0.02 K/UL (ref 0–0.51)
EOSINOPHIL NFR BLD: 0.1 % (ref 0–6.9)
ERYTHROCYTE [DISTWIDTH] IN BLOOD BY AUTOMATED COUNT: 39.7 FL (ref 35.9–50)
GLUCOSE SERPL-MCNC: 110 MG/DL (ref 65–99)
HCT VFR BLD AUTO: 46.2 % (ref 42–52)
HGB BLD-MCNC: 15.9 G/DL (ref 14–18)
IMM GRANULOCYTES # BLD AUTO: 0.06 K/UL (ref 0–0.11)
IMM GRANULOCYTES NFR BLD AUTO: 0.4 % (ref 0–0.9)
LACTATE BLD-SCNC: 1.2 MMOL/L (ref 0.5–2)
LYMPHOCYTES # BLD AUTO: 3.6 K/UL (ref 1–4.8)
LYMPHOCYTES NFR BLD: 22.2 % (ref 22–41)
MCH RBC QN AUTO: 29.1 PG (ref 27–33)
MCHC RBC AUTO-ENTMCNC: 34.4 G/DL (ref 33.7–35.3)
MCV RBC AUTO: 84.6 FL (ref 81.4–97.8)
MONOCYTES # BLD AUTO: 1.95 K/UL (ref 0–0.85)
MONOCYTES NFR BLD AUTO: 12 % (ref 0–13.4)
NEUTROPHILS # BLD AUTO: 10.56 K/UL (ref 1.82–7.42)
NEUTROPHILS NFR BLD: 65.1 % (ref 44–72)
NRBC # BLD AUTO: 0 K/UL
NRBC BLD-RTO: 0 /100 WBC
PLATELET # BLD AUTO: 120 K/UL (ref 164–446)
PMV BLD AUTO: 9.6 FL (ref 9–12.9)
POTASSIUM SERPL-SCNC: 3.1 MMOL/L (ref 3.6–5.5)
RBC # BLD AUTO: 5.46 M/UL (ref 4.7–6.1)
SODIUM SERPL-SCNC: 133 MMOL/L (ref 135–145)
WBC # BLD AUTO: 16.2 K/UL (ref 4.8–10.8)

## 2019-03-21 PROCEDURE — 700101 HCHG RX REV CODE 250: Performed by: HOSPITALIST

## 2019-03-21 PROCEDURE — 83605 ASSAY OF LACTIC ACID: CPT

## 2019-03-21 PROCEDURE — A9270 NON-COVERED ITEM OR SERVICE: HCPCS | Performed by: HOSPITALIST

## 2019-03-21 PROCEDURE — 700111 HCHG RX REV CODE 636 W/ 250 OVERRIDE (IP): Performed by: HOSPITALIST

## 2019-03-21 PROCEDURE — 96375 TX/PRO/DX INJ NEW DRUG ADDON: CPT

## 2019-03-21 PROCEDURE — 96366 THER/PROPH/DIAG IV INF ADDON: CPT

## 2019-03-21 PROCEDURE — 700105 HCHG RX REV CODE 258: Performed by: HOSPITALIST

## 2019-03-21 PROCEDURE — 99233 SBSQ HOSP IP/OBS HIGH 50: CPT | Performed by: HOSPITALIST

## 2019-03-21 PROCEDURE — 700102 HCHG RX REV CODE 250 W/ 637 OVERRIDE(OP): Performed by: HOSPITALIST

## 2019-03-21 PROCEDURE — 96376 TX/PRO/DX INJ SAME DRUG ADON: CPT

## 2019-03-21 PROCEDURE — 80048 BASIC METABOLIC PNL TOTAL CA: CPT

## 2019-03-21 PROCEDURE — 770006 HCHG ROOM/CARE - MED/SURG/GYN SEMI*

## 2019-03-21 PROCEDURE — 85025 COMPLETE CBC W/AUTO DIFF WBC: CPT

## 2019-03-21 RX ORDER — MORPHINE SULFATE 4 MG/ML
2 INJECTION, SOLUTION INTRAMUSCULAR; INTRAVENOUS
Status: DISCONTINUED | OUTPATIENT
Start: 2019-03-21 | End: 2019-03-22

## 2019-03-21 RX ORDER — ONDANSETRON 2 MG/ML
4 INJECTION INTRAMUSCULAR; INTRAVENOUS EVERY 4 HOURS PRN
Status: DISCONTINUED | OUTPATIENT
Start: 2019-03-21 | End: 2019-03-22

## 2019-03-21 RX ORDER — ONDANSETRON 4 MG/1
4 TABLET, ORALLY DISINTEGRATING ORAL EVERY 4 HOURS PRN
Status: DISCONTINUED | OUTPATIENT
Start: 2019-03-21 | End: 2019-03-23 | Stop reason: HOSPADM

## 2019-03-21 RX ORDER — OMEPRAZOLE 20 MG/1
40 CAPSULE, DELAYED RELEASE ORAL EVERY 12 HOURS
Status: DISCONTINUED | OUTPATIENT
Start: 2019-03-21 | End: 2019-03-22

## 2019-03-21 RX ORDER — POTASSIUM CHLORIDE 7.45 MG/ML
10 INJECTION INTRAVENOUS
Status: COMPLETED | OUTPATIENT
Start: 2019-03-21 | End: 2019-03-21

## 2019-03-21 RX ADMIN — PROCHLORPERAZINE EDISYLATE 10 MG: 5 INJECTION INTRAMUSCULAR; INTRAVENOUS at 21:29

## 2019-03-21 RX ADMIN — MORPHINE SULFATE 2 MG: 4 INJECTION INTRAVENOUS at 18:07

## 2019-03-21 RX ADMIN — POTASSIUM CHLORIDE 10 MEQ: 7.46 INJECTION, SOLUTION INTRAVENOUS at 00:56

## 2019-03-21 RX ADMIN — PROCHLORPERAZINE EDISYLATE 10 MG: 5 INJECTION INTRAMUSCULAR; INTRAVENOUS at 00:49

## 2019-03-21 RX ADMIN — ONDANSETRON 4 MG: 2 INJECTION INTRAMUSCULAR; INTRAVENOUS at 23:45

## 2019-03-21 RX ADMIN — MORPHINE SULFATE 2 MG: 4 INJECTION INTRAVENOUS at 15:06

## 2019-03-21 RX ADMIN — SODIUM CHLORIDE: 9 INJECTION, SOLUTION INTRAVENOUS at 21:36

## 2019-03-21 RX ADMIN — MORPHINE SULFATE 2 MG: 4 INJECTION INTRAVENOUS at 10:52

## 2019-03-21 RX ADMIN — MORPHINE SULFATE 2 MG: 4 INJECTION INTRAVENOUS at 00:49

## 2019-03-21 RX ADMIN — PROCHLORPERAZINE EDISYLATE 10 MG: 5 INJECTION INTRAMUSCULAR; INTRAVENOUS at 07:15

## 2019-03-21 RX ADMIN — MORPHINE SULFATE 2 MG: 4 INJECTION INTRAVENOUS at 04:03

## 2019-03-21 RX ADMIN — ONDANSETRON 4 MG: 2 INJECTION INTRAMUSCULAR; INTRAVENOUS at 18:17

## 2019-03-21 RX ADMIN — OMEPRAZOLE 40 MG: 20 CAPSULE, DELAYED RELEASE ORAL at 21:31

## 2019-03-21 RX ADMIN — MORPHINE SULFATE 2 MG: 4 INJECTION INTRAVENOUS at 07:15

## 2019-03-21 RX ADMIN — PROCHLORPERAZINE EDISYLATE 10 MG: 5 INJECTION INTRAMUSCULAR; INTRAVENOUS at 15:11

## 2019-03-21 RX ADMIN — SODIUM CHLORIDE: 9 INJECTION, SOLUTION INTRAVENOUS at 13:46

## 2019-03-21 RX ADMIN — PROMETHAZINE HYDROCHLORIDE 12.5 MG: 25 SUPPOSITORY RECTAL at 09:28

## 2019-03-21 RX ADMIN — DIPHENHYDRAMINE HYDROCHLORIDE 25 MG: 50 INJECTION INTRAMUSCULAR; INTRAVENOUS at 00:49

## 2019-03-21 RX ADMIN — SODIUM CHLORIDE 1 G: 900 INJECTION INTRAVENOUS at 18:10

## 2019-03-21 RX ADMIN — METRONIDAZOLE 500 MG: 500 INJECTION, SOLUTION INTRAVENOUS at 21:36

## 2019-03-21 RX ADMIN — MORPHINE SULFATE 2 MG: 4 INJECTION INTRAVENOUS at 23:55

## 2019-03-21 RX ADMIN — METRONIDAZOLE 500 MG: 500 INJECTION, SOLUTION INTRAVENOUS at 13:46

## 2019-03-21 RX ADMIN — METRONIDAZOLE 500 MG: 500 INJECTION, SOLUTION INTRAVENOUS at 06:37

## 2019-03-21 RX ADMIN — SODIUM CHLORIDE: 9 INJECTION, SOLUTION INTRAVENOUS at 00:55

## 2019-03-21 RX ADMIN — POTASSIUM CHLORIDE 10 MEQ: 7.46 INJECTION, SOLUTION INTRAVENOUS at 02:12

## 2019-03-21 RX ADMIN — PROMETHAZINE HYDROCHLORIDE 12.5 MG: 25 SUPPOSITORY RECTAL at 19:48

## 2019-03-21 ASSESSMENT — ENCOUNTER SYMPTOMS
HEARTBURN: 0
SORE THROAT: 0
RESPIRATORY NEGATIVE: 1
HEADACHES: 0
WEAKNESS: 0
BLURRED VISION: 0
FEVER: 0
SHORTNESS OF BREATH: 0
BACK PAIN: 0
BLOOD IN STOOL: 0
CHILLS: 0
NAUSEA: 1
DOUBLE VISION: 0
BRUISES/BLEEDS EASILY: 0
NERVOUS/ANXIOUS: 0
VOMITING: 0
CONSTITUTIONAL NEGATIVE: 1
PALPITATIONS: 0
MYALGIAS: 0
NEUROLOGICAL NEGATIVE: 1
VOMITING: 1
NECK PAIN: 0
CARDIOVASCULAR NEGATIVE: 1
FLANK PAIN: 0
PSYCHIATRIC NEGATIVE: 1
MUSCULOSKELETAL NEGATIVE: 1
DIARRHEA: 0
DEPRESSION: 0
ABDOMINAL PAIN: 1
WEIGHT LOSS: 0
COUGH: 0
CONSTIPATION: 0
INSOMNIA: 0
DIZZINESS: 0
LOSS OF CONSCIOUSNESS: 0

## 2019-03-21 ASSESSMENT — COGNITIVE AND FUNCTIONAL STATUS - GENERAL
STANDING UP FROM CHAIR USING ARMS: A LITTLE
SUGGESTED CMS G CODE MODIFIER MOBILITY: CJ
MOBILITY SCORE: 21
WALKING IN HOSPITAL ROOM: A LITTLE
SUGGESTED CMS G CODE MODIFIER DAILY ACTIVITY: CH
CLIMB 3 TO 5 STEPS WITH RAILING: A LITTLE
DAILY ACTIVITIY SCORE: 24

## 2019-03-21 ASSESSMENT — COPD QUESTIONNAIRES
DO YOU EVER COUGH UP ANY MUCUS OR PHLEGM?: NO/ONLY WITH OCCASIONAL COLDS OR INFECTIONS
DURING THE PAST 4 WEEKS HOW MUCH DID YOU FEEL SHORT OF BREATH: SOME OF THE TIME

## 2019-03-21 ASSESSMENT — LIFESTYLE VARIABLES
ALCOHOL_USE: NO
EVER_SMOKED: YES

## 2019-03-21 NOTE — PROGRESS NOTES
PRN nausea medication added by MD, pt also started on omeprazole. Pt given PRN pain medication and nausea medication. Pt stating medications helping.

## 2019-03-21 NOTE — PROGRESS NOTES
Patient arrived to unit from ED.   Ambulated from stretcher to bed with 1 person, hand-held assist. Gait weak, pt fatigued.  A&Ox4, SpO2 WNL on RA. Afebrile. VSS. Complaint of nausea and abdominal pain.  Plan of care discussed and questions answered. Needs addressed.  Falls precaution initiated and bed alarm on. Call light within reach.

## 2019-03-21 NOTE — ASSESSMENT & PLAN NOTE
-Sepsis criteria: Tachycardic, leukocytosis 17.4, source is likely enteritis viral versus bacterial.  -He does have underlying history of cyclical vomiting secondary to marijuana use.  Stop antibiotics leukocytosis has resolved  Blood cultures negative to date  Normal lactic acid levels

## 2019-03-21 NOTE — CARE PLAN
Problem: Bowel/Gastric:  Goal: Normal bowel function is maintained or improved  Outcome: PROGRESSING AS EXPECTED  Intermittent nausea overnight with dry heaves this AM. PRN 10mg Compazine given q6h. Abdomen tender throughout   and semifirm. LBM 3/20 and bowel sounds normoactive. Tolerating clear liquids at this time.    Problem: Pain Management  Goal: Pain level will decrease to patient's comfort goal  Outcome: PROGRESSING AS EXPECTED  Complaint of intermittent abdominal pain to RUQ/LUQ, cramping 5-6/10. PRN IV morphine given q3h.  Encouraging patient to use nonpharmacologic pain management techniques additionally, such as distraction,   deep breathing, rest and repositioning. Patient able to rest comfortably in between care.

## 2019-03-21 NOTE — ED TRIAGE NOTES
Pt bib family with c/o n/v that began yesterday. Pt now reporting that he's developed CP with SOB today.

## 2019-03-21 NOTE — ED PROVIDER NOTES
ED Provider Note    CHIEF COMPLAINT  Abdominal pain, vomiting    HPI  Jerome Cardoza Jr. is a 44 y.o. male who presents for evaluation of upper abdominal pain associated with vomiting for the past 3 days, patient states he is been unable to keep anything, solids or liquids, down and vomits after anything he ingested.  The patient denies fever.  He does report pain across his upper abdomen worse in the midline on the left side.  He reports no bowel movements over the past 3 days.  The patient does have a history of a bowel obstruction after a cholecystectomy years ago.  The patient denies fever, no chest pain or shortness of breath, no acute back pain at this time he offers no other complaints although he is in a significant amount of distress and is unable to provide full history, some history is provided by his significant other at the bedside.    REVIEW OF SYSTEMS  Negative for fever, rash, chest pain, dyspnea, headache, focal weakness, focal numbness, focal tingling, back pain. All other systems are negative.     PAST MEDICAL HISTORY  Past Medical History:   Diagnosis Date   • Bronchitis    • Chickenpox    • GERD (gastroesophageal reflux disease)    • IBS (irritable bowel syndrome)    • Indigestion    • Influenza    • Pneumonia    • Pulmonary embolism (HCC)    • Renal disorder     Acute Renal Failure from dehydration in the past   • Sleep apnea        FAMILY HISTORY  Family History   Problem Relation Age of Onset   • Other Mother         Lupus   • Sleep Apnea Mother    • Heart Disease Father    • Hypertension Father    • Sleep Apnea Father    • Heart Disease Paternal Uncle    • Heart Disease Paternal Grandfather        SOCIAL HISTORY  Social History   Substance Use Topics   • Smoking status: Current Every Day Smoker     Packs/day: 0.50     Years: 22.00     Types: Cigarettes   • Smokeless tobacco: Never Used   • Alcohol use No       SURGICAL HISTORY  Past Surgical History:   Procedure Laterality Date   •  "KNEE REPLACEMENT, TOTAL Left 10/2017   • LENORA BY LAPAROSCOPY      Cholecystectomy, Laparoscopic   • OTHER ORTHOPEDIC SURGERY     • TONSILLECTOMY     • TONSILLECTOMY         CURRENT MEDICATIONS  I personally reviewed the medication list in the charting documentation.     ALLERGIES  Allergies   Allergen Reactions   • Fish Anaphylaxis     [place] and tuna   • Hops Oil Anaphylaxis   • Barley Grass Anaphylaxis   • Green Beans Shortness of Breath and Nausea   • Nicoderm [Nicotine] Rash, Itching and Swelling     Blisters noted to patch site   • Turkey Shortness of Breath and Nausea       MEDICAL RECORD  I have reviewed patient's medical record and pertinent results are listed above.      PHYSICAL EXAM  VITAL SIGNS: /95   Pulse (!) 112   Temp 37.1 °C (98.7 °F) (Temporal)   Resp (!) 22   Ht 1.727 m (5' 8\")   Wt 88.5 kg (195 lb 1.7 oz)   SpO2 96%   BMI 29.67 kg/m²    Constitutional: Acutely ill-appearing  HENT: Mucus membranes dry.    Eyes: No scleral icterus. Normal conjunctiva   Neck: Supple, comfortable, nonpainful range of motion.   Cardiovascular: Tachycardic, regular  Thorax & Lungs: Chest is nontender.  Lungs are clear to auscultation with good air movement bilaterally.  No wheeze, rhonchi, nor rales.   Abdomen: Soft, mild distention, generalized tenderness worse in the epigastrium, no rebound, no guarding  Skin: Warm, dry. No rash appreciated  Extremities/Musculoskeletal: No sign of trauma. No asymmetric calf tenderness, erythema or edema. Normal range of motion   Neurologic: Alert & oriented. No focal deficits observed.   Psychiatric: Normal affect appropriate for the clinical situation.    DIAGNOSTIC STUDIES / PROCEDURES    LABS/EKGs  Results for orders placed or performed during the hospital encounter of 03/20/19   CBC with Differential   Result Value Ref Range    WBC 17.4 (H) 4.8 - 10.8 K/uL    RBC 6.32 (H) 4.70 - 6.10 M/uL    Hemoglobin 18.2 (H) 14.0 - 18.0 g/dL    Hematocrit 52.1 (H) 42.0 - 52.0 % "    MCV 82.4 81.4 - 97.8 fL    MCH 28.8 27.0 - 33.0 pg    MCHC 34.9 33.7 - 35.3 g/dL    RDW 38.2 35.9 - 50.0 fL    Platelet Count 269 164 - 446 K/uL    MPV 9.5 9.0 - 12.9 fL    Neutrophils-Polys 69.40 44.00 - 72.00 %    Lymphocytes 19.10 (L) 22.00 - 41.00 %    Monocytes 10.40 0.00 - 13.40 %    Eosinophils 0.40 0.00 - 6.90 %    Basophils 0.20 0.00 - 1.80 %    Immature Granulocytes 0.50 0.00 - 0.90 %    Nucleated RBC 0.00 /100 WBC    Neutrophils (Absolute) 12.07 (H) 1.82 - 7.42 K/uL    Lymphs (Absolute) 3.33 1.00 - 4.80 K/uL    Monos (Absolute) 1.81 (H) 0.00 - 0.85 K/uL    Eos (Absolute) 0.07 0.00 - 0.51 K/uL    Baso (Absolute) 0.04 0.00 - 0.12 K/uL    Immature Granulocytes (abs) 0.09 0.00 - 0.11 K/uL    NRBC (Absolute) 0.00 K/uL   Complete Metabolic Panel (CMP)   Result Value Ref Range    Sodium 130 (L) 135 - 145 mmol/L    Potassium 3.2 (L) 3.6 - 5.5 mmol/L    Chloride 86 (L) 96 - 112 mmol/L    Co2 23 20 - 33 mmol/L    Anion Gap 21.0 (H) 0.0 - 11.9    Glucose 133 (H) 65 - 99 mg/dL    Bun 39 (H) 8 - 22 mg/dL    Creatinine 1.86 (H) 0.50 - 1.40 mg/dL    Calcium 10.0 8.4 - 10.2 mg/dL    AST(SGOT) 34 12 - 45 U/L    ALT(SGPT) 38 2 - 50 U/L    Alkaline Phosphatase 87 30 - 99 U/L    Total Bilirubin 1.7 (H) 0.1 - 1.5 mg/dL    Albumin 5.5 (H) 3.2 - 4.9 g/dL    Total Protein 8.8 (H) 6.0 - 8.2 g/dL    Globulin 3.3 1.9 - 3.5 g/dL    A-G Ratio 1.7 g/dL   Troponin   Result Value Ref Range    Troponin I 0.02 0.00 - 0.04 ng/mL   ESTIMATED GFR   Result Value Ref Range    GFR If  48 (A) >60 mL/min/1.73 m 2    GFR If Non  39 (A) >60 mL/min/1.73 m 2   LIPASE   Result Value Ref Range    Lipase 65 (H) 7 - 58 U/L   EKG   Result Value Ref Range    Report       [facility] Emergency Dept.    Test Date:  2019-03-20  Pt Name:    ALEJO SALAZAR           Department: [facility]  MRN:        [id number]                      [facility]  Gender:     Male                         Technician:  DL  :        [id number]                   Requested By:ER TRIAGE PROTOCOL  Order #:    [id number]                    Reading MD:    Measurements  Intervals                                Axis  Rate:       115                          P:          68  DC:         120                          QRS:        74  QRSD:       86                           T:          53  QT:         360  QTc:        498    Interpretive Statements  SINUS TACHYCARDIA  PROBABLE LEFT ATRIAL ABNORMALITY  RSR' IN V1 OR V2, PROBABLY NORMAL VARIANT  BORDERLINE PROLONGED QT INTERVAL  ARTIFACT IN LEAD(S) I,II,aVR  Compared to ECG 10/06/2018 11:35:30  RSR' in V1 or V2 now present          RADIOLOGY  CT-ABDOMEN-PELVIS WITH   Final Result         1.  Thickened mildly reactive loops of small bowel in left upper quadrant, appearance likely corresponding with enteritis.   2.  Small pericardial effusion      DX-CHEST-PORTABLE (1 VIEW)   Final Result         1.  No acute cardiopulmonary disease.            COURSE & MEDICAL DECISION MAKING  I have reviewed any medical record information, laboratory studies and radiographic results as noted above.  Differential diagnoses includes: Gastroenteritis, bowel obstruction, ischemic bowel, intra-abdominal infection, hepatitis, pancreatitis    Encounter Summary: This is a 44 y.o. male with nausea vomiting over the past 3 days with upper abdominal pain, on exam he is in distress, he appears dehydrated, he is tachycardic but not febrile, his abdomen is generally tender worse in the upper portions of the abdomen, his blood work reveals significant leukocytosis as well as multiple markers of dehydration with renal insufficiency and electrolyte abnormalities.  He also has an elevated anion gap.  This patient is significantly dehydrated, will receive IV fluids, pain medication and antiemetics, will obtain a CT scan of the abdomen pelvis and reevaluate ------- CT scan shows a mild enteritis, patient reevaluated continues to  vomit, admitted to hospital in guarded condition    HYDRATION: Based on the patient's presentation of Acute Vomiting and Dehydration the patient was given IV fluids. IV Hydration was used because oral hydration was not adequate alone. Upon recheck following hydration, the patient was Slowly improving.        DISPOSITION: Admit in guarded condition      FINAL IMPRESSION  1. Enteritis    2. Sepsis, due to unspecified organism (HCC)    3. Dehydration    4. Prerenal azotemia           This dictation was created using voice recognition software. The accuracy of the dictation is limited to the abilities of the software. I expect there may be some errors of grammar and possibly content. The nursing notes were reviewed and certain aspects of this information were incorporated into this note.    Electronically signed by: Edgard Zuniga, 3/20/2019 9:55 PM

## 2019-03-21 NOTE — DIETARY
"Nutrition services: Day 0 of admit.  Jerome Cardoza Jr. is a 44 y.o. male with admitting DX of KLEBER (acute kidney injury) (HCC), Cyclical vomiting with dx of enteritis. Pt with hx of HTN, hyperlipidemia and marijuana related cyclical vomiting.   Consult received for poor PO PTA      Assessment:  Height: 172.7 cm (5' 8\")  Weight: 84 kg (185 lb 3 oz)  Body mass index is 28.16 kg/m².   Diet/Intake: clear liquids/ %    Evaluation:   1. Pt with poor PO for 2 days PTA due to N&V. Pt known to have this every 3 months or so. Current PO intake of clear liquid diet is good and BMI indicates he is overweight. Current diet order is nutritionally restricted and advancement when medically feasible is recommended.   2. Labs: potassium=3.1, sodium=133, glucose=110, BUN=28  3. Meds: Completed Reglan and KCL IV.  4. IV fluids: NS @ 175 ml/hour     Malnutrition Risk: Criteria not met for malnutrition    Recommendations/Plan:  1. Advance diet beyond clear liquids when medically feasible.   2. Encourage intake of >50%  3. Document intake of all meals  as % taken in ADL's to provide interdisciplinary communication across all shifts.   4. Monitor weight.  5. Nutrition rep will continue to see patient for ongoing meal and snack preferences.   6.   RD will monitor.         "

## 2019-03-21 NOTE — PROGRESS NOTES
Report received from SRUTHI Reddy. Patient to transfer from ED to [facility], room 301-2.

## 2019-03-21 NOTE — ASSESSMENT & PLAN NOTE
Stop antibiotics white count is now normal.  Enteritis most likely viral or just related to cyclic vomiting.

## 2019-03-21 NOTE — PROGRESS NOTES
Report received from Mehrdad SPENCER RN. Report then given to Lucia NEGRO. Awaiting pt arrival to floor.

## 2019-03-21 NOTE — PROGRESS NOTES
· 2 RN skin check complete with SRTUHI Avalos.  · Devices in place: None  · Skin assessed: CDI.  · Confirmed pressure ulcers found on: None.  · New potential pressure ulcers noted on None. Wound consult: N/A  · The following interventions in place: Turns self in bed. Encouraging frequent turns/repositioning and ambulation at least TID. Maintain adequate PO intake.

## 2019-03-21 NOTE — H&P
Hospital Medicine History & Physical Note    Date of Service  3/20/2019    Primary Care Physician  Andrea Machado P.A.-C.    Code Status  Full Code    Chief Complaint  Vomiting    History of Presenting Illness  44 y.o. male history of hypertension, hyperlipidemia and marijuana related cyclical vomiting, known to me from prior admission, who presented to the ER on 3/20/2019 with 3-day history of nausea and vomiting.  Reports having over 10 episodes of non-bile/nonbloody emesis today.  He reports being unable to keep anything down.  He also reports that he has this type of issues every 3 months or so.  He has significantly decreased p.o. intake over the last 2 days.  And has associated mild generalized abdominal pain.    ER COURSE:  -CT of the abdomen done in the emergency department showed acute enteritis.  -Other pertinent laboratory findings showed leukocytosis of 17.4, hemoglobin of 18.2 (likely concentrated), creatinine 1.82 and potassium of 3.2.    Review of Systems  Review of Systems   Constitutional: Negative for fever.   HENT: Negative for congestion and sore throat.    Eyes: Negative for blurred vision and double vision.   Respiratory: Negative for cough and shortness of breath.    Cardiovascular: Negative for chest pain and palpitations.   Gastrointestinal: Positive for abdominal pain, nausea and vomiting.   Genitourinary: Negative for dysuria and urgency.   Musculoskeletal: Negative for myalgias and neck pain.   Skin: Negative for itching and rash.   Neurological: Negative for dizziness, weakness and headaches.   Endo/Heme/Allergies: Does not bruise/bleed easily.   Psychiatric/Behavioral: Negative for depression. The patient does not have insomnia.        Past Medical History   has a past medical history of Bronchitis; Chickenpox; GERD (gastroesophageal reflux disease); IBS (irritable bowel syndrome); Indigestion; Influenza; Pneumonia; Pulmonary embolism (HCC); Renal disorder; and Sleep  apnea.    Surgical History   has a past surgical history that includes asher by laparoscopy; tonsillectomy; other orthopedic surgery; knee replacement, total (Left, 10/2017); and tonsillectomy.     Family History  family history includes Heart Disease in his father, paternal grandfather, and paternal uncle; Hypertension in his father; Other in his mother; Sleep Apnea in his father and mother.     Social History   reports that he has been smoking Cigarettes.  He has a 11.00 pack-year smoking history. He has never used smokeless tobacco. He reports that he uses drugs, including Inhaled. He reports that he does not drink alcohol.    Allergies  Allergies   Allergen Reactions   • Fish Anaphylaxis     [place] and tuna   • Hops Oil Anaphylaxis   • Barley Grass Anaphylaxis   • Green Beans Shortness of Breath and Nausea   • Nicoderm [Nicotine] Rash, Itching and Swelling     Blisters noted to patch site   • Turkey Shortness of Breath and Nausea       Medications  Prior to Admission Medications   Prescriptions Last Dose Informant Patient Reported? Taking?   acyclovir (ZOVIRAX) 400 MG tablet  Significant Other No No   Sig: Take 1 Tab by mouth 3 times a day.   amitriptyline (ELAVIL) 100 MG Tab  Significant Other Yes No   Sig: Take 100 mg by mouth every evening.   metoprolol SR (TOPROL XL) 25 MG TABLET SR 24 HR   No No   Sig: Take 1 Tab by mouth every day.   Patient not taking: Reported on 11/29/2018   ondansetron (ZOFRAN ODT) 4 MG TABLET DISPERSIBLE  Significant Other Yes No   Sig: Take 4 mg by mouth every 6 hours as needed for Nausea.   pantoprazole (PROTONIX) 40 MG Tablet Delayed Response  Significant Other Yes No   Sig: Take 40 mg by mouth 2 times a day.   promethazine (PHENERGAN) 25 MG Tab  Significant Other No No   Sig: Take 1 Tab by mouth every 6 hours as needed for Nausea/Vomiting.   raNITidine (ZANTAC) 150 MG Tab   No No   Sig: TAKE 1 TABLET BY MOUTH TWICE DAILY   simvastatin (ZOCOR) 10 MG Tab   No No   Sig: Take 1 Tab by  mouth every evening.      Facility-Administered Medications: None       Physical Exam  Temp:  [37.1 °C (98.7 °F)] 37.1 °C (98.7 °F)  Pulse:  [105-120] 105  Resp:  [22] 22  BP: (143)/(95) 143/95  SpO2:  [92 %-96 %] 92 %    Physical Exam   Constitutional: He is oriented to person, place, and time. He appears well-developed and well-nourished.   HENT:   Head: Normocephalic and atraumatic.   Dry oral mucosa.   Eyes: Pupils are equal, round, and reactive to light. EOM are normal.   Neck: Normal range of motion. Neck supple.   Cardiovascular: Normal rate and regular rhythm.    Pulmonary/Chest: Effort normal and breath sounds normal.   Abdominal: Soft. Bowel sounds are normal. He exhibits no distension. There is tenderness (Mild generalized pain on palpation). There is no rebound and no guarding.   Musculoskeletal: Normal range of motion. He exhibits no edema.   Neurological: He is alert and oriented to person, place, and time.   Skin: Skin is warm and dry.   Psychiatric: He has a normal mood and affect. His behavior is normal.       Laboratory:  Recent Labs      03/20/19 2030   WBC  17.4*   RBC  6.32*   HEMOGLOBIN  18.2*   HEMATOCRIT  52.1*   MCV  82.4   MCH  28.8   MCHC  34.9   RDW  38.2   PLATELETCT  269   MPV  9.5     Recent Labs      03/20/19 2030   SODIUM  130*   POTASSIUM  3.2*   CHLORIDE  86*   CO2  23   GLUCOSE  133*   BUN  39*   CREATININE  1.86*   CALCIUM  10.0     Recent Labs      03/20/19 2030   ALTSGPT  38   ASTSGOT  34   ALKPHOSPHAT  87   TBILIRUBIN  1.7*   LIPASE  65*   GLUCOSE  133*                 Recent Labs      03/20/19 2030   TROPONINI  0.02       Urinalysis:    No results found     Imaging:  CT-ABDOMEN-PELVIS WITH   Final Result         1.  Thickened mildly reactive loops of small bowel in left upper quadrant, appearance likely corresponding with enteritis.   2.  Small pericardial effusion      DX-CHEST-PORTABLE (1 VIEW)   Final Result         1.  No acute cardiopulmonary disease.             Assessment/Plan:  I anticipate this patient is appropriate for observation status at this time.    * Sepsis ([facility])   Assessment & Plan    -Sepsis criteria: Tachycardic, leukocytosis 17.4, source is likely enteritis viral versus bacterial.  -He does have underlying history of cyclical vomiting secondary to marijuana use.  -IV fluids: He received 2 L normal saline bolus in the emergent department and I will continue with normal saline 175 cc an hour.  -Antibiotics: Rocephin and Flagyl, first dose given in the ED.  -Blood cultures ×2 done in the emergency department prior to administration of antibiotics  -Initial lactic: Just added.       KLEBER (acute kidney injury) ([facility])   Assessment & Plan    -Creatinine on admission is 1.82.  This is likely secondary to nausea and vomiting.  -IV fluids as above and will monitor labs in the morning     Cyclical vomiting syndrome- (present on admission)   Assessment & Plan    -Per patient, 15 history of cyclical vomiting.  He was admitted with similar symptoms 4 times last year, he is known to me from prior admission.  -Ordered regimen with Phenergan, Compazine and Benadryl.  -IV fluids.  He is not actively vomiting at this time.     Marijuana intoxication, with unspecified complication ([facility])- (present on admission)   Assessment & Plan    -As above.     Hypokalemia   Assessment & Plan    -On admission 3.2, likely secondary to nausea and vomiting.  Replace lites twice as needed.     Hypertension   Assessment & Plan    -Blood pressure now is 132/79.  He takes metoprolol but no p.o. medications at this time, if needed may received IV blood pressure medication.     Enteritis   Assessment & Plan    -As above.     Dyslipidemia- (present on admission)   Assessment & Plan    -Resume simvastatin when tolerating p.o.         VTE prophylaxis: SCD's

## 2019-03-21 NOTE — ASSESSMENT & PLAN NOTE
Again discussed with patient that he absolutely needs to stop using marijuana or THC products in any form to avoid return of the cyclic vomiting.

## 2019-03-21 NOTE — ASSESSMENT & PLAN NOTE
-Per patient, 15 history of cyclical vomiting.  He was admitted with similar symptoms 4 times last year, he is known to me from prior admission. He admits to using THC about a week ago or so.  Counseled patient that he can expect to have return of nausea and vomiting if he chooses to go back to using THC products.  In addition I do think he has some component of gastroenteritis but this could be just simply from active vomiting.  Discontinue IV antiemetics, changed to oral Zofran 4 mg every 6 hours as needed, Phenergan 25 mg every 6 hours as needed, Tigan suppositories 12.5 mg every 6 hours as needed.  Reporting not tolerating diet and vomiting, patient is to show all vomit to his nurse in the vomitus is to be measured and recorded, output of urine is to be recorded as well.  Change back to n.p.o. with ice chips only.

## 2019-03-21 NOTE — CARE PLAN
Problem: Safety  Goal: Will remain free from injury  Outcome: PROGRESSING AS EXPECTED  Pt educated to call prior to ambulating if feeling dizzy.     Problem: Pain Management  Goal: Pain level will decrease to patient's comfort goal  Outcome: PROGRESSING SLOWER THAN EXPECTED  Pt stating pain, pt given PRN morphine. Heat packs used to attempt to decrease pain in abdomin.

## 2019-03-21 NOTE — PROGRESS NOTES
Assumed care of pt at 0740, received report from Monie NEGRO. DORI/Ox4, discussed plan of care. Pt on room air, pt attempted to eat breakfast, pt stating nausea after, PRN Phenergan suppository given, up self, pt running NS at 175 ml/hr. All needs met at this time. Bed in lowest position and call light within reach, instructed to call for any assistance.

## 2019-03-21 NOTE — ASSESSMENT & PLAN NOTE
Now resolved with IV hydration, patient continues to complain of vomiting and did not eat much today therefore restart fluids today and repeat CMP in the morning.

## 2019-03-22 PROBLEM — K92.1 BLOOD IN STOOL: Status: ACTIVE | Noted: 2019-03-22

## 2019-03-22 LAB
ANION GAP SERPL CALC-SCNC: 8 MMOL/L (ref 0–11.9)
BASOPHILS # BLD AUTO: 0.4 % (ref 0–1.8)
BASOPHILS # BLD: 0.04 K/UL (ref 0–0.12)
BUN SERPL-MCNC: 10 MG/DL (ref 8–22)
CALCIUM SERPL-MCNC: 8.3 MG/DL (ref 8.4–10.2)
CHLORIDE SERPL-SCNC: 105 MMOL/L (ref 96–112)
CO2 SERPL-SCNC: 23 MMOL/L (ref 20–33)
CREAT SERPL-MCNC: 0.85 MG/DL (ref 0.5–1.4)
EOSINOPHIL # BLD AUTO: 0.06 K/UL (ref 0–0.51)
EOSINOPHIL NFR BLD: 0.6 % (ref 0–6.9)
ERYTHROCYTE [DISTWIDTH] IN BLOOD BY AUTOMATED COUNT: 41.9 FL (ref 35.9–50)
GLUCOSE SERPL-MCNC: 115 MG/DL (ref 65–99)
HCT VFR BLD AUTO: 39.5 % (ref 42–52)
HGB BLD-MCNC: 13.1 G/DL (ref 14–18)
IMM GRANULOCYTES # BLD AUTO: 0.03 K/UL (ref 0–0.11)
IMM GRANULOCYTES NFR BLD AUTO: 0.3 % (ref 0–0.9)
LYMPHOCYTES # BLD AUTO: 3.11 K/UL (ref 1–4.8)
LYMPHOCYTES NFR BLD: 31.3 % (ref 22–41)
MCH RBC QN AUTO: 29.1 PG (ref 27–33)
MCHC RBC AUTO-ENTMCNC: 33.2 G/DL (ref 33.7–35.3)
MCV RBC AUTO: 87.8 FL (ref 81.4–97.8)
MONOCYTES # BLD AUTO: 1.05 K/UL (ref 0–0.85)
MONOCYTES NFR BLD AUTO: 10.6 % (ref 0–13.4)
NEUTROPHILS # BLD AUTO: 5.65 K/UL (ref 1.82–7.42)
NEUTROPHILS NFR BLD: 56.8 % (ref 44–72)
NRBC # BLD AUTO: 0 K/UL
NRBC BLD-RTO: 0 /100 WBC
PLATELET # BLD AUTO: 89 K/UL (ref 164–446)
PMV BLD AUTO: 9.6 FL (ref 9–12.9)
POTASSIUM SERPL-SCNC: 3.1 MMOL/L (ref 3.6–5.5)
RBC # BLD AUTO: 4.5 M/UL (ref 4.7–6.1)
SODIUM SERPL-SCNC: 136 MMOL/L (ref 135–145)
WBC # BLD AUTO: 9.9 K/UL (ref 4.8–10.8)

## 2019-03-22 PROCEDURE — 85025 COMPLETE CBC W/AUTO DIFF WBC: CPT

## 2019-03-22 PROCEDURE — C9113 INJ PANTOPRAZOLE SODIUM, VIA: HCPCS | Performed by: HOSPITALIST

## 2019-03-22 PROCEDURE — 700101 HCHG RX REV CODE 250: Performed by: HOSPITALIST

## 2019-03-22 PROCEDURE — 770006 HCHG ROOM/CARE - MED/SURG/GYN SEMI*

## 2019-03-22 PROCEDURE — 700105 HCHG RX REV CODE 258: Performed by: HOSPITALIST

## 2019-03-22 PROCEDURE — 80048 BASIC METABOLIC PNL TOTAL CA: CPT

## 2019-03-22 PROCEDURE — 700102 HCHG RX REV CODE 250 W/ 637 OVERRIDE(OP): Performed by: HOSPITALIST

## 2019-03-22 PROCEDURE — A9270 NON-COVERED ITEM OR SERVICE: HCPCS | Performed by: HOSPITALIST

## 2019-03-22 PROCEDURE — 99232 SBSQ HOSP IP/OBS MODERATE 35: CPT | Performed by: HOSPITALIST

## 2019-03-22 PROCEDURE — 700111 HCHG RX REV CODE 636 W/ 250 OVERRIDE (IP): Performed by: HOSPITALIST

## 2019-03-22 RX ORDER — SODIUM CHLORIDE 9 MG/ML
INJECTION, SOLUTION INTRAVENOUS CONTINUOUS
Status: DISCONTINUED | OUTPATIENT
Start: 2019-03-22 | End: 2019-03-23 | Stop reason: HOSPADM

## 2019-03-22 RX ORDER — PANTOPRAZOLE SODIUM 40 MG/10ML
40 INJECTION, POWDER, LYOPHILIZED, FOR SOLUTION INTRAVENOUS DAILY
Status: DISCONTINUED | OUTPATIENT
Start: 2019-03-22 | End: 2019-03-23 | Stop reason: HOSPADM

## 2019-03-22 RX ORDER — ACETAMINOPHEN 500 MG
500 TABLET ORAL EVERY 6 HOURS PRN
Status: DISCONTINUED | OUTPATIENT
Start: 2019-03-22 | End: 2019-03-23 | Stop reason: HOSPADM

## 2019-03-22 RX ORDER — METRONIDAZOLE 500 MG/1
500 TABLET ORAL EVERY 8 HOURS
Status: DISCONTINUED | OUTPATIENT
Start: 2019-03-22 | End: 2019-03-22

## 2019-03-22 RX ORDER — PROMETHAZINE HYDROCHLORIDE 25 MG/1
25 TABLET ORAL EVERY 6 HOURS PRN
Status: DISCONTINUED | OUTPATIENT
Start: 2019-03-22 | End: 2019-03-23 | Stop reason: HOSPADM

## 2019-03-22 RX ORDER — TRAMADOL HYDROCHLORIDE 50 MG/1
50 TABLET ORAL EVERY 6 HOURS PRN
Status: DISCONTINUED | OUTPATIENT
Start: 2019-03-22 | End: 2019-03-23 | Stop reason: HOSPADM

## 2019-03-22 RX ADMIN — PROMETHAZINE HYDROCHLORIDE 25 MG: 25 TABLET ORAL at 17:43

## 2019-03-22 RX ADMIN — SODIUM CHLORIDE: 9 INJECTION, SOLUTION INTRAVENOUS at 16:24

## 2019-03-22 RX ADMIN — PROMETHAZINE HYDROCHLORIDE 25 MG: 25 TABLET ORAL at 10:48

## 2019-03-22 RX ADMIN — ONDANSETRON 4 MG: 4 TABLET, ORALLY DISINTEGRATING ORAL at 09:25

## 2019-03-22 RX ADMIN — PANTOPRAZOLE SODIUM 40 MG: 40 INJECTION, POWDER, FOR SOLUTION INTRAVENOUS at 13:55

## 2019-03-22 RX ADMIN — SODIUM CHLORIDE: 9 INJECTION, SOLUTION INTRAVENOUS at 13:56

## 2019-03-22 RX ADMIN — SODIUM CHLORIDE: 9 INJECTION, SOLUTION INTRAVENOUS at 04:26

## 2019-03-22 RX ADMIN — PROCHLORPERAZINE EDISYLATE 10 MG: 5 INJECTION INTRAMUSCULAR; INTRAVENOUS at 06:03

## 2019-03-22 RX ADMIN — TRAMADOL HYDROCHLORIDE 50 MG: 50 TABLET, FILM COATED ORAL at 17:43

## 2019-03-22 RX ADMIN — TRAMADOL HYDROCHLORIDE 50 MG: 50 TABLET, FILM COATED ORAL at 09:25

## 2019-03-22 RX ADMIN — ONDANSETRON 4 MG: 4 TABLET, ORALLY DISINTEGRATING ORAL at 13:55

## 2019-03-22 RX ADMIN — OMEPRAZOLE 40 MG: 20 CAPSULE, DELAYED RELEASE ORAL at 06:06

## 2019-03-22 RX ADMIN — METRONIDAZOLE 500 MG: 500 INJECTION, SOLUTION INTRAVENOUS at 06:08

## 2019-03-22 RX ADMIN — ONDANSETRON 4 MG: 2 INJECTION INTRAMUSCULAR; INTRAVENOUS at 04:23

## 2019-03-22 ASSESSMENT — ENCOUNTER SYMPTOMS
NAUSEA: 1
BLOOD IN STOOL: 1
BACK PAIN: 0
HEARTBURN: 0
BRUISES/BLEEDS EASILY: 0
DEPRESSION: 0
VOMITING: 1
COUGH: 0
LOSS OF CONSCIOUSNESS: 0
FLANK PAIN: 0
FEVER: 0
SHORTNESS OF BREATH: 0
CONSTIPATION: 0
DIZZINESS: 0
MUSCULOSKELETAL NEGATIVE: 1
CARDIOVASCULAR NEGATIVE: 1
NERVOUS/ANXIOUS: 0
WEIGHT LOSS: 0
WEAKNESS: 1
ABDOMINAL PAIN: 1
DIARRHEA: 0
MYALGIAS: 0
CHILLS: 0
PSYCHIATRIC NEGATIVE: 1
RESPIRATORY NEGATIVE: 1

## 2019-03-22 NOTE — PROGRESS NOTES
Medicated per MAR, pt stated he felt much better than yesterday but was still nauseous, prn compazine was given. Safety precautions are in place, will continue to monitor.

## 2019-03-22 NOTE — PROGRESS NOTES
Report given to Joy NEGRO and Daryl NEGRO. Plan of care discussed. Patient resting comfortably in bed. Safety precautions in place.

## 2019-03-22 NOTE — PROGRESS NOTES
"1948 Pt stated he continued to feel nauseous, pt requested prn phenergan suppository, see MAR.  Assessment completed, pt is a&o x4, but fatigued, pt has a small emesis (about 25 mL) that was clear orange, pt stated that he felt like the cranberry juice he had made him feel \"sick.\"  Prilosec was held by am RN due to pt vomiting, pt stated he would like to wait a little longer to receive medication. Non slip socks provided to patient as well as a cup of ice with ginger ale.     2130 Pt stated he was feeling better but requested prn compazine, see MAR. Pt dry heaving but has not had emesis. PO medication given and pt tolerated well. Pt stated his abdominal pain was 5/10 but refused medication. Safety precautions in place, will continue to monitor.   "

## 2019-03-22 NOTE — CARE PLAN
Problem: Knowledge Deficit  Goal: Knowledge of disease process/condition, treatment plan, diagnostic tests, and medications will improve  Outcome: PROGRESSING AS EXPECTED  Discuss plan of care with patient including available prn medications for nausea/vomiting. Communicated with patient times of next doses by writing the times down on communication white board for patient. Allowed time for questions, pt verbalized understanding, will continue to monitor.     Problem: Pain Management  Goal: Pain level will decrease to patient's comfort goal  Outcome: PROGRESSING AS EXPECTED   03/21/19 2130   OTHER   Pain Rating Scale (NPRS) 5     Pt declined pain medication at the time of assessment, will continue to monitor.    Problem: Psychosocial Needs:  Goal: Level of anxiety will decrease  Outcome: PROGRESSING AS EXPECTED   03/21/19 2000   OTHER   Patient Behaviors Fatigue     Pt is fatigued but did not show any signs of anxiety.

## 2019-03-22 NOTE — PROGRESS NOTES
Report received from Gordon NEGRO. Plan of care discussed. Patient resting comfortably in bed, declines any further needs at this time. Safety precautions in place.

## 2019-03-22 NOTE — PROGRESS NOTES
Report received from Noemí NEGRO. Plan of care discussed. Patient resting comfortably in bed Zofran just given for emesis, declines any further needs at this time. Safety precautions in place.

## 2019-03-22 NOTE — CARE PLAN
Problem: Safety  Goal: Will remain free from injury  Outcome: PROGRESSING AS EXPECTED  Pt oriented to call light and agreed to sit at edge of bed before standing to assess for lightheadedness/dizzyness.      Problem: Mobility  Goal: Risk for activity intolerance will decrease  Outcome: PROGRESSING AS EXPECTED  Pt is up self to the bathroom and frequently alternates between his hospital bed and chair.

## 2019-03-22 NOTE — PROGRESS NOTES
Pt assessed, he is up in chair and c/o nausea and 6/10 cramping abd pain.  Pt had one episode of emesis this AM.

## 2019-03-23 VITALS
SYSTOLIC BLOOD PRESSURE: 132 MMHG | WEIGHT: 197.53 LBS | TEMPERATURE: 97.8 F | RESPIRATION RATE: 18 BRPM | HEART RATE: 74 BPM | DIASTOLIC BLOOD PRESSURE: 84 MMHG | HEIGHT: 68 IN | OXYGEN SATURATION: 96 % | BODY MASS INDEX: 29.94 KG/M2

## 2019-03-23 LAB
ALBUMIN SERPL BCP-MCNC: 3.1 G/DL (ref 3.2–4.9)
ALBUMIN/GLOB SERPL: 1.5 G/DL
ALP SERPL-CCNC: 57 U/L (ref 30–99)
ALT SERPL-CCNC: 23 U/L (ref 2–50)
ANION GAP SERPL CALC-SCNC: 7 MMOL/L (ref 0–11.9)
AST SERPL-CCNC: 19 U/L (ref 12–45)
BASOPHILS # BLD AUTO: 0.6 % (ref 0–1.8)
BASOPHILS # BLD: 0.05 K/UL (ref 0–0.12)
BILIRUB SERPL-MCNC: 0.9 MG/DL (ref 0.1–1.5)
BUN SERPL-MCNC: 9 MG/DL (ref 8–22)
CALCIUM SERPL-MCNC: 8.2 MG/DL (ref 8.4–10.2)
CHLORIDE SERPL-SCNC: 105 MMOL/L (ref 96–112)
CO2 SERPL-SCNC: 24 MMOL/L (ref 20–33)
CREAT SERPL-MCNC: 0.79 MG/DL (ref 0.5–1.4)
EOSINOPHIL # BLD AUTO: 0.15 K/UL (ref 0–0.51)
EOSINOPHIL NFR BLD: 1.8 % (ref 0–6.9)
ERYTHROCYTE [DISTWIDTH] IN BLOOD BY AUTOMATED COUNT: 41.3 FL (ref 35.9–50)
GLOBULIN SER CALC-MCNC: 2.1 G/DL (ref 1.9–3.5)
GLUCOSE SERPL-MCNC: 80 MG/DL (ref 65–99)
HCT VFR BLD AUTO: 38 % (ref 42–52)
HGB BLD-MCNC: 12.5 G/DL (ref 14–18)
IMM GRANULOCYTES # BLD AUTO: 0.02 K/UL (ref 0–0.11)
IMM GRANULOCYTES NFR BLD AUTO: 0.2 % (ref 0–0.9)
LYMPHOCYTES # BLD AUTO: 3.27 K/UL (ref 1–4.8)
LYMPHOCYTES NFR BLD: 38.8 % (ref 22–41)
MCH RBC QN AUTO: 29.1 PG (ref 27–33)
MCHC RBC AUTO-ENTMCNC: 32.9 G/DL (ref 33.7–35.3)
MCV RBC AUTO: 88.4 FL (ref 81.4–97.8)
MONOCYTES # BLD AUTO: 0.67 K/UL (ref 0–0.85)
MONOCYTES NFR BLD AUTO: 7.9 % (ref 0–13.4)
NEUTROPHILS # BLD AUTO: 4.27 K/UL (ref 1.82–7.42)
NEUTROPHILS NFR BLD: 50.7 % (ref 44–72)
NRBC # BLD AUTO: 0 K/UL
NRBC BLD-RTO: 0 /100 WBC
PLATELET # BLD AUTO: 87 K/UL (ref 164–446)
PMV BLD AUTO: 10.1 FL (ref 9–12.9)
POTASSIUM SERPL-SCNC: 3.4 MMOL/L (ref 3.6–5.5)
PROT SERPL-MCNC: 5.2 G/DL (ref 6–8.2)
RBC # BLD AUTO: 4.3 M/UL (ref 4.7–6.1)
SODIUM SERPL-SCNC: 136 MMOL/L (ref 135–145)
WBC # BLD AUTO: 8.4 K/UL (ref 4.8–10.8)

## 2019-03-23 PROCEDURE — 80053 COMPREHEN METABOLIC PANEL: CPT

## 2019-03-23 PROCEDURE — 85025 COMPLETE CBC W/AUTO DIFF WBC: CPT

## 2019-03-23 PROCEDURE — 99239 HOSP IP/OBS DSCHRG MGMT >30: CPT | Performed by: HOSPITALIST

## 2019-03-23 PROCEDURE — 700111 HCHG RX REV CODE 636 W/ 250 OVERRIDE (IP): Performed by: HOSPITALIST

## 2019-03-23 PROCEDURE — 700105 HCHG RX REV CODE 258: Performed by: HOSPITALIST

## 2019-03-23 PROCEDURE — C9113 INJ PANTOPRAZOLE SODIUM, VIA: HCPCS | Performed by: HOSPITALIST

## 2019-03-23 RX ORDER — POTASSIUM CHLORIDE 7.45 MG/ML
10 INJECTION INTRAVENOUS
Status: COMPLETED | OUTPATIENT
Start: 2019-03-23 | End: 2019-03-23

## 2019-03-23 RX ADMIN — ONDANSETRON 4 MG: 4 TABLET, ORALLY DISINTEGRATING ORAL at 01:30

## 2019-03-23 RX ADMIN — POTASSIUM CHLORIDE 10 MEQ: 7.46 INJECTION, SOLUTION INTRAVENOUS at 10:45

## 2019-03-23 RX ADMIN — SODIUM CHLORIDE: 9 INJECTION, SOLUTION INTRAVENOUS at 08:45

## 2019-03-23 RX ADMIN — SODIUM CHLORIDE: 9 INJECTION, SOLUTION INTRAVENOUS at 00:29

## 2019-03-23 RX ADMIN — POTASSIUM CHLORIDE 10 MEQ: 7.46 INJECTION, SOLUTION INTRAVENOUS at 09:31

## 2019-03-23 RX ADMIN — PANTOPRAZOLE SODIUM 40 MG: 40 INJECTION, POWDER, FOR SOLUTION INTRAVENOUS at 05:56

## 2019-03-23 RX ADMIN — ONDANSETRON 4 MG: 4 TABLET, ORALLY DISINTEGRATING ORAL at 06:06

## 2019-03-23 NOTE — DISCHARGE INSTRUCTIONS
Discharge Instructions    Discharged to home by car with relative. Discharged via walking, hospital escort: Yes.  Special equipment needed: Not Applicable    Be sure to schedule a follow-up appointment with your primary care doctor or any specialists as instructed.     Discharge Plan:   Smoking Cessation Offered: Patient Counseled  Influenza Vaccine Indication: Not indicated: Previously immunized this influenza season and > 8 years of age    I understand that a diet low in cholesterol, fat, and sodium is recommended for good health. Unless I have been given specific instructions below for another diet, I accept this instruction as my diet prescription.   Other diet:     Special Instructions: None  Cannabis Use Disorder  Cannabis use disorder is when using marijuana disrupts a person's daily life or causes health problems. This condition can be dangerous. The health problems this condition can cause include:  · Long-lasting problems with thinking and learning. These can be permanent in young people.  · Severe anxiety.  · Paranoia.  · Hallucinations.  · Dangerously high blood pressure and heart rate.  · Schizophrenia.  · Breathing problems.  · Problems with child development during and after pregnancy.  People with this condition are also more likely to use other drugs.  What are the causes?  This condition is caused by using marijuana too much over time. It is not caused by using it only once in a while. Many people with this condition use marijuana because it gives them a feeling of extreme pleasure or relaxation.  What increases the risk?  This condition is more likely to develop in:  · Men.  · People with a family history of cannabis use disorder.  · People with mental health issues such as depression or post-traumatic stress disorder.  What are the signs or symptoms?  Symptoms of this condition include:  · Using greater amounts of marijuana than you want to, or using marijuana for longer than you want  to.  · Craving marijuana.  · Spending a lot of time getting marijuana and using it or recovering from its effects.  · Having problems at work, at school, at home, or in relationships because of marijuana use.  · Giving up or cutting down on important life activities because of marijuana use.  · Using marijuana at times when it is dangerous, such as while you are driving a car.  · Needing more and more marijuana to get the same effect you want from the marijuana (building up a tolerance).  · Physical problems, such as:  ¨ A long-lasting cough.  ¨ Bronchitis.  ¨ Emphysema.  ¨ Throat and lung cancer.  · Mental problems, such as:  ¨ Psychosis.  ¨ Anxiety.  ¨ Trouble sleeping.  · Having symptoms of withdrawal when you stop using marijuana. Symptom of withdrawal include:  ¨ Irritability or anger.  ¨ Anxiety or restlessness.  ¨ Trouble sleeping.  ¨ Loss of appetite or weight loss.  ¨ Aches and pains.  ¨ Shakiness, sweating, or chills.  How is this diagnosed?  This condition is diagnosed with an assessment. During the assessment, your health care provider will ask about your marijuana use and about how it affects your life. You will be diagnosed with the condition if you have had at least two symptoms of this condition within a 12-month period. How severe the condition is depends on how many symptoms you have.  · If you have two to three symptoms, your condition is mild.  · If you have four to five symptoms, your condition is moderate.  · If you have six or more symptoms, your condition is severe.  Your health care provider may perform a physical exam or do lab tests to see if you have physical problems resulting from marijuana use. Your health care provider may also screen for drug use and refer you to a mental health professional for evaluation.  How is this treated?  Treatment for this condition is usually provided by mental health professionals with training in substance use disorders. Your treatment may  involve:  · Counseling. This treatment is also called talk therapy. It is provided by substance use treatment counselors. A counselor can address the reasons you use marijuana and suggest ways to keep you from using it again. The goals of talk therapy are to:  ¨ Find healthy activities to replace using marijuana.  ¨ Identify and avoid the things that trigger your marijuana use.  ¨ Help you learn how to handle cravings.  · Support groups. Support groups are led by people who have quit using marijuana. They provide emotional support, advice, and guidance.  · Medicine. Medicine is used to treat mental health issues that trigger marijuana use or that result from it.  Follow these instructions at home:  · Take over-the-counter and prescription medicines only as told by your health care provider.  · Check with your health care provider before starting any new medicines.  · Keep all follow-up visits as told by your health care provider. This is important.  · Work with your counselor or group to develop tools to keep you from using marijuana again (relapsing).  · Make healthy lifestyle choices, such as:  ¨ Eating a healthy diet.  ¨ Getting enough exercise.  ¨ Improving your stress-management skills.  · Learn daily living skills and work skills.  Where to find more information:  · National [place] on Drug Abuse: www.drugabuse.gov  · Substance Abuse and Mental Health Services Administration: www.samhsa.gov  Contact a health care provider if:  · You are not able to take your medicines as told.  · Your symptoms get worse.  Get help right away if:  · You have serious thoughts about hurting yourself or others.  If you ever feel like you may hurt yourself or others, or have thoughts about taking your own life, get help right away. You can go to your nearest emergency department or call:  · Your local emergency services (911 in the [place]  · A suicide crisis helpline, such as the National Suicide Prevention Lifeline at  0-690-160-8802. This is open 24 hours a day.  This information is not intended to replace advice given to you by your health care provider. Make sure you discuss any questions you have with your health care provider.  Document Released: 12/15/2001 Document Revised: 09/15/2017 Document Reviewed: 09/15/2017  [employer] Patient Education © 2017 [employer]      · Is patient discharged on Warfarin / Coumadin?   No     Depression / Suicide Risk    As you are discharged from this [facility] facility, it is important to learn how to keep safe from harming yourself.    Recognize the warning signs:  · Abrupt changes in personality, positive or negative- including increase in energy   · Giving away possessions  · Change in eating patterns- significant weight changes-  positive or negative  · Change in sleeping patterns- unable to sleep or sleeping all the time   · Unwillingness or inability to communicate  · Depression  · Unusual sadness, discouragement and loneliness  · Talk of wanting to die  · Neglect of personal appearance   · Rebelliousness- reckless behavior  · Withdrawal from people/activities they love  · Confusion- inability to concentrate     If you or a loved one observes any of these behaviors or has concerns about self-harm, here's what you can do:  · Talk about it- your feelings and reasons for harming yourself  · Remove any means that you might use to hurt yourself (examples: pills, rope, extension cords, firearm)  · Get professional help from the community (Mental Health, Substance Abuse, psychological counseling)  · Do not be alone:Call your Safe Contact- someone whom you trust who will be there for you.  · Call your local CRISIS HOTLINE 749-8334 or 988-330-1588  · Call your local Children's Mobile Crisis Response Team [place] (768) 644-4547 or www.[employer]ios  · Call the toll free National Suicide Prevention Hotlines   · National Suicide Prevention Lifeline 987-116-PYFK  (1896)  · [facility] 800-SUICIDE (258-4805)

## 2019-03-23 NOTE — PROGRESS NOTES
Report given to Pramod NEGRO. Plan of care discussed. Patient resting comfortably in bed. Safety precautions in place.

## 2019-03-23 NOTE — ASSESSMENT & PLAN NOTE
Patient complaining of dark stool and dark red stool.  Sent for guaiac evaluation.  Since hydration his hemoglobin did go from 18-13.  Continue to monitor, increase PPI to 40 mg of Prilosec twice daily

## 2019-03-23 NOTE — PROGRESS NOTES
Medicated pt per MAR, pt requested prn Zofran, see MAR. Declined any further needs at this time. No emesis or BM during the night. Safety precautions are in place, will continue to monitor.

## 2019-03-23 NOTE — PROGRESS NOTES
Report received from Joy NEGRO and Daryl NEGRO. Plan of care discussed. Patient resting comfortably in bed with family at bedside, declines any further needs at this time. Safety precautions in place.

## 2019-03-23 NOTE — PROGRESS NOTES
Received report from SRUTHI Arellano. Pt was NPO at night and c/o nausea. Pt was resting in bed during bedside report, no needs at the time.     Assessment completed discussed POC with pt. No needs at this time.     Discussed POC with pt and MD. Will advance diet as tolerated. Pt drinking chicken broth at this time. Will continue to monitor.

## 2019-03-23 NOTE — DISCHARGE SUMMARY
Discharge Summary    CHIEF COMPLAINT ON ADMISSION  No chief complaint on file.      Reason for Admission  Chest Pain; Vomiting     Admission Date  3/20/2019    CODE STATUS  Full Code    HPI & HOSPITAL COURSE      44 y.o. male history of hypertension, hyperlipidemia and marijuana related cyclical vomiting, known to me from prior admission, who presented to the ER on 3/20/2019 with 3-day history of nausea and vomiting.  Reports having over 10 episodes of non-bile/nonbloody emesis today.  He reports being unable to keep anything down.  He also reports that he has this type of issues every 3 months or so.  He has significantly decreased p.o. intake over the last 2 days.  And has associated mild generalized abdominal pain. CT of the abdomen done in the emergency department showed acute enteritis.Other pertinent laboratory findings showed leukocytosis of 17.4, hemoglobin of 18.2 (likely concentrated), creatinine 1.82 and potassium of 3.2.   Patient admits that he used marijuana the week prior to admission. 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 Treated with bowel rest, anti emetics including zofran, phenergan. He was given some IV pain medication prior to discharge with improvement. Renal function returned to normal and potassium normalized after fluids and IV replacement. He was tolerating food and no longer having pain or vomiting at discharge.  Patient is advised to never use marijuana products again lest he develop intractable nausea and vomiting. This will not resolve.    Therefore, he is discharged in good and stable condition to home with close outpatient follow-up.    The patient met 2-midnight criteria for an inpatient stay at the time of discharge.    Discharge Date  3/23/19    FOLLOW UP ITEMS POST DISCHARGE  Primary care.    DISCHARGE DIAGNOSES  Principal Problem:    Sepsis (HCC) POA: Unknown  Active Problems:    Marijuana intoxication, with unspecified complication (HCC)  POA: Yes    Cyclical vomiting syndrome POA: Yes    KLEBER (acute kidney injury) (HCC) POA: Unknown    Enteritis POA: Unknown    Hypertension POA: Unknown    Hypokalemia POA: Unknown    Dyslipidemia POA: Yes    Abdominal pain POA: Unknown    Blood in stool POA: Unknown  Resolved Problems:    * No resolved hospital problems. *      FOLLOW UP  Future Appointments  Date Time Provider Department Center   5/20/2019 8:30 AM [facility] HERNANDEZ [facility] [place]   5/30/2019 4:20 PM [facility] None     Andrea Machado, P.A.-C.  [address]  [address]  [facility] [address]  [phone]            MEDICATIONS ON DISCHARGE     Medication List      CONTINUE taking these medications      Instructions   pantoprazole 40 MG Tbec  Commonly known as:  PROTONIX   Take 40 mg by mouth 2 times a day.  Dose:  40 mg     promethazine 25 MG Tabs  Commonly known as:  PHENERGAN   Take 1 Tab by mouth every 6 hours as needed for Nausea/Vomiting.  Dose:  25 mg     raNITidine 150 MG Tabs  Commonly known as:  ZANTAC   Doctor's comments:  Please consider 90 day supplies to promote better adherence  TAKE 1 TABLET BY MOUTH TWICE DAILY     simvastatin 10 MG Tabs  Commonly known as:  ZOCOR   Take 1 Tab by mouth every evening.  Dose:  10 mg     ZOFRAN ODT 4 MG Tbdp  Generic drug:  ondansetron   Take 4 mg by mouth every 6 hours as needed for Nausea.  Dose:  4 mg            Allergies  Allergies   Allergen Reactions   • Fish Anaphylaxis     [place] and tuna   • Hops Oil Anaphylaxis   • Barley Grass Anaphylaxis   • Green Beans Shortness of Breath and Nausea   • Nicoderm [Nicotine] Rash, Itching and Swelling     Blisters noted to patch site   • Turkey Shortness of Breath and Nausea       DIET  Orders Placed This Encounter   Procedures   • Diet Order Full Liquid     Standing Status:   Standing     Number of Occurrences:   1     Order Specific Question:   Diet:     Answer:   Full Liquid [11]       ACTIVITY  As tolerated.  Weight bearing as  tolerated    CONSULTATIONS  none    PROCEDURES  none    LABORATORY  Lab Results   Component Value Date    SODIUM 136 03/23/2019    POTASSIUM 3.4 (L) 03/23/2019    CHLORIDE 105 03/23/2019    CO2 24 03/23/2019    GLUCOSE 80 03/23/2019    BUN 9 03/23/2019    CREATININE 0.79 03/23/2019        Lab Results   Component Value Date    WBC 8.4 03/23/2019    HEMOGLOBIN 12.5 (L) 03/23/2019    HEMATOCRIT 38.0 (L) 03/23/2019    PLATELETCT 87 (L) 03/23/2019        Total time of the discharge process exceeds 32 minutes.

## 2019-03-23 NOTE — PROGRESS NOTES
2030 Assessment completed, pt is a&o x4, states he is still feeling slight nausea but declines medication at this time. Pt stated his pain is at 4/10 at the moment and declines any pain medication at this time. Pt is now NPO with only ice chips, a cup of ice chips has been provided. Safety precautions are in place, will continue to monitor.     2310 Pt is resting in bed comfortably declines any needs at this time. Safety precautions in place.

## 2019-03-23 NOTE — CARE PLAN
Problem: Bowel/Gastric:  Goal: Normal bowel function is maintained or improved  Outcome: PROGRESSING SLOWER THAN EXPECTED   03/22/19 0900   OTHER   Last BM 03/20/19     Per pt, stool was dark, educated pt on letting RN know when pt has a bowel movement to observe color and inform MD. Pt verbalized understanding, will continue to monitor. Pt continues to have mild nausea, declined medication at this time, no emesis.     Problem: Knowledge Deficit  Goal: Knowledge of disease process/condition, treatment plan, diagnostic tests, and medications will improve  Outcome: PROGRESSING AS EXPECTED  Discuss plan of care with patient including NPO diet status, IV fluids and mediation administered. Will continue to monitor

## 2019-03-23 NOTE — PROGRESS NOTES
[facility] Medicine Daily Progress Note    Date of Service  3/22/2019    Chief Complaint  44 y.o. male admitted 3/20/2019 with vomiting.    Hospital Course    44 y.o. male history of hypertension, hyperlipidemia and marijuana related cyclical vomiting, known to me from prior admission, who presented to the ER on 3/20/2019 with 3-day history of nausea and vomiting.  Reports having over 10 episodes of non-bile/nonbloody emesis today.  He reports being unable to keep anything down.  He also reports that he has this type of issues every 3 months or so.  He has significantly decreased p.o. intake over the last 2 days.  And has associated mild generalized abdominal pain. CT of the abdomen done in the emergency department showed acute enteritis.Other pertinent laboratory findings showed leukocytosis of 17.4, hemoglobin of 18.2 (likely concentrated), creatinine 1.82 and potassium of 3.2.      Interval Problem Update  Reports he is continued to have vomiting, apparently dark yellow in color no bile.  No blood.  He states that he also has dark stools and may have had some dark red blood in his stool as well.  Continues to complain of abdominal pain.    Consultants/Specialty  None.    Code Status  Full.    Disposition  home    Review of Systems  Review of Systems   Constitutional: Positive for malaise/fatigue. Negative for chills, fever and weight loss.   HENT: Negative.    Respiratory: Negative.  Negative for cough and shortness of breath.    Cardiovascular: Negative.  Negative for chest pain and leg swelling.   Gastrointestinal: Positive for abdominal pain, blood in stool, melena, nausea and vomiting. Negative for constipation, diarrhea and heartburn.   Genitourinary: Negative.  Negative for dysuria and flank pain.   Musculoskeletal: Negative.  Negative for back pain and myalgias.   Neurological: Positive for weakness. Negative for dizziness and loss of consciousness.   Endo/Heme/Allergies: Negative.  Does not bruise/bleed  easily.   Psychiatric/Behavioral: Negative.  Negative for depression. The patient is not nervous/anxious.    All other systems reviewed and are negative.       Physical Exam  Temp:  [36.7 °C (98.1 °F)-36.8 °C (98.3 °F)] 36.8 °C (98.2 °F)  Pulse:  [] 87  Resp:  [16-20] 20  BP: (118-154)/() 118/75  SpO2:  [92 %-94 %] 94 %    Physical Exam   Constitutional: He is oriented to person, place, and time. He appears well-developed and well-nourished. He appears distressed.   Plan of care discussed with bedside RN.   HENT:   Nose: Nose normal.   Mouth/Throat: Oropharynx is clear and moist. No oropharyngeal exudate.   Eyes: Conjunctivae are normal. Right eye exhibits no discharge. Left eye exhibits no discharge. No scleral icterus.   Neck: No JVD present. No tracheal deviation present.   Cardiovascular: Normal rate, regular rhythm and normal heart sounds.    Pulmonary/Chest: Effort normal and breath sounds normal. No stridor. No respiratory distress. He has no wheezes. He has no rales. He exhibits no tenderness.   Abdominal: Soft. Bowel sounds are normal. He exhibits no distension. There is no tenderness.   Musculoskeletal: He exhibits no edema or tenderness.   Neurological: He is alert and oriented to person, place, and time. No cranial nerve deficit. He exhibits normal muscle tone.   Skin: Skin is warm and dry. He is not diaphoretic. There is pallor.   Psychiatric: He has a normal mood and affect. His behavior is normal. Judgment and thought content normal.   Nursing note and vitals reviewed.      Fluids    Intake/Output Summary (Last 24 hours) at 03/22/19 1711  Last data filed at 03/22/19 1400   Gross per 24 hour   Intake         29752.33 ml   Output              775 ml   Net         39463.33 ml       Laboratory  Recent Labs      03/20/19   2030  03/21/19   0432  03/22/19   0455   WBC  17.4*  16.2*  9.9   RBC  6.32*  5.46  4.50*   HEMOGLOBIN  18.2*  15.9  13.1*   HEMATOCRIT  52.1*  46.2  39.5*   MCV  82.4  84.6   87.8   MCH  28.8  29.1  29.1   MCHC  34.9  34.4  33.2*   RDW  38.2  39.7  41.9   PLATELETCT  269  120*  89*   MPV  9.5  9.6  9.6     Recent Labs      03/20/19   2030  03/21/19   0428  03/22/19   0451   SODIUM  130*  133*  136   POTASSIUM  3.2*  3.1*  3.1*   CHLORIDE  86*  94*  105   CO2  23  25  23   GLUCOSE  133*  110*  115*   BUN  39*  28*  10   CREATININE  1.86*  1.24  0.85   CALCIUM  10.0  8.5  8.3*                   Imaging  CT-ABDOMEN-PELVIS WITH   Final Result         1.  Thickened mildly reactive loops of small bowel in left upper quadrant, appearance likely corresponding with enteritis.   2.  Small pericardial effusion      DX-CHEST-PORTABLE (1 VIEW)   Final Result         1.  No acute cardiopulmonary disease.           Assessment/Plan  * Sepsis ([facility])   Assessment & Plan    -Sepsis criteria: Tachycardic, leukocytosis 17.4, source is likely enteritis viral versus bacterial.  -He does have underlying history of cyclical vomiting secondary to marijuana use.  Stop antibiotics leukocytosis has resolved  Blood cultures negative to date  Normal lactic acid levels       KLEBER (acute kidney injury) ([facility])   Assessment & Plan    Now resolved with IV hydration, patient continues to complain of vomiting and did not eat much today therefore restart fluids today and repeat CMP in the morning.     Cyclical vomiting syndrome- (present on admission)   Assessment & Plan    -Per patient, 15 history of cyclical vomiting.  He was admitted with similar symptoms 4 times last year, he is known to me from prior admission. He admits to using THC about a week ago or so.  Counseled patient that he can expect to have return of nausea and vomiting if he chooses to go back to using THC products.  In addition I do think he has some component of gastroenteritis but this could be just simply from active vomiting.  Discontinue IV antiemetics, changed to oral Zofran 4 mg every 6 hours as needed, Phenergan 25 mg every 6 hours as needed, Tigan  suppositories 12.5 mg every 6 hours as needed.  Reporting not tolerating diet and vomiting, patient is to show all vomit to his nurse in the vomitus is to be measured and recorded, output of urine is to be recorded as well.  Change back to n.p.o. with ice chips only.     Marijuana intoxication, with unspecified complication (HCC)- (present on admission)   Assessment & Plan    Again discussed with patient that he absolutely needs to stop using marijuana or THC products in any form to avoid return of the cyclic vomiting.     Hypokalemia   Assessment & Plan    Still low replace with oral potassium, include potassium replace in iv fluids as well.     Hypertension   Assessment & Plan    -Blood pressure since admission is in with normal limits, currently 118/79.     Enteritis   Assessment & Plan    Stop antibiotics white count is now normal.  Enteritis most likely viral or just related to cyclic vomiting.     Blood in stool   Assessment & Plan    Patient complaining of dark stool and dark red stool.  Sent for guaiac evaluation.  Since hydration his hemoglobin did go from 18-13.  Continue to monitor, increase PPI to 40 mg of Prilosec twice daily     Abdominal pain   Assessment & Plan    Due to retching and gastroenteritis.  Discontinue morphine, changed to tramadol as needed.     Dyslipidemia- (present on admission)   Assessment & Plan    -Resume simvastatin when he goes home          VTE prophylaxis: SCD

## 2019-03-23 NOTE — PROGRESS NOTES
"New IV fluids bag administered, pt resting in bed comfortably. Stated he was feeling \"okay.\" Declines any needs at this time, safety precautions are in place, will continue to monitor.    "

## 2019-04-02 NOTE — DOCUMENTATION QUERY
"                                                                         [facility]                                                                         Query Response Note      PATIENT:               ALEJO SALAZAR  ACCT #:                  [id number]  MRN:                       [id number]  :                       1975  ADMIT DATE:       3/20/2019 8:28 PM  DISCH DATE:        3/23/2019 5:03 PM  RESPONDING  PROVIDER #:        [id number]           RESPONSE TEXT:    1. Associated with acute kidney injury    QUERY TEXT:    Cause and Effect Relationship [email or web address]    Based on clinical findings, risk factors and treatment, can \"sepsis\" be further clarified as:    NOTE:  If an appropriate response is not listed below, please respond with a new note.      The patient's Clinical Indicators include:  sepsis  KLEBER  enteritis  dehydration  Query created by: Migdalia Medellin on 2019 12:36 PM        Electronically signed by:  GUICHO NASSAR MD 2019 1:18 PM         "

## 2019-05-20 ENCOUNTER — NON-PROVIDER VISIT (OUTPATIENT)
Dept: MEDICAL GROUP | Facility: MEDICAL CENTER | Age: 44
End: 2019-05-20

## 2019-05-20 ENCOUNTER — TELEPHONE (OUTPATIENT)
Dept: MEDICAL GROUP | Facility: MEDICAL CENTER | Age: 44
End: 2019-05-20

## 2019-05-20 DIAGNOSIS — Z23 NEED FOR HEPATITIS B VACCINATION: ICD-10-CM

## 2019-05-20 PROCEDURE — 90746 HEPB VACCINE 3 DOSE ADULT IM: CPT | Performed by: PHYSICIAN ASSISTANT

## 2019-05-20 PROCEDURE — 90471 IMMUNIZATION ADMIN: CPT | Performed by: PHYSICIAN ASSISTANT

## 2019-05-20 NOTE — NON-PROVIDER
"Jerome Cardoza Jr. is a 44 y.o. male here for a non-provider visit for:   HEPATITIS B 3 of 3    Reason for immunization: continue or complete series started at the office  Immunization records indicate need for vaccine: Yes, confirmed with Epic  Minimum interval has been met for this vaccine: Yes  ABN completed: Not Indicated    Order and dose verified by: FABIO MENDEZ Dated  07/20/2016 was given to patient: Yes  All IAC Questionnaire questions were answered \"No.\"    Patient tolerated injection and no adverse effects were observed or reported: Yes    Pt scheduled for next dose in series: No    "

## 2020-01-07 ENCOUNTER — HOSPITAL ENCOUNTER (INPATIENT)
Facility: MEDICAL CENTER | Age: 45
LOS: 3 days | DRG: 897 | End: 2020-01-10
Attending: EMERGENCY MEDICINE | Admitting: HOSPITALIST

## 2020-01-07 ENCOUNTER — APPOINTMENT (OUTPATIENT)
Dept: RADIOLOGY | Facility: MEDICAL CENTER | Age: 45
DRG: 897 | End: 2020-01-07
Attending: EMERGENCY MEDICINE

## 2020-01-07 DIAGNOSIS — D72.829 LEUKOCYTOSIS, UNSPECIFIED TYPE: ICD-10-CM

## 2020-01-07 DIAGNOSIS — N17.9 ACUTE RENAL FAILURE, UNSPECIFIED ACUTE RENAL FAILURE TYPE (HCC): ICD-10-CM

## 2020-01-07 DIAGNOSIS — E86.0 DEHYDRATION: ICD-10-CM

## 2020-01-07 DIAGNOSIS — R10.84 GENERALIZED ABDOMINAL PAIN: ICD-10-CM

## 2020-01-07 DIAGNOSIS — R19.7 VOMITING AND DIARRHEA: ICD-10-CM

## 2020-01-07 DIAGNOSIS — R11.10 VOMITING AND DIARRHEA: ICD-10-CM

## 2020-01-07 PROBLEM — E87.20 METABOLIC ACIDOSIS: Status: ACTIVE | Noted: 2020-01-07

## 2020-01-07 PROBLEM — R65.20 SEVERE SEPSIS (HCC): Status: ACTIVE | Noted: 2019-03-21

## 2020-01-07 PROBLEM — F12.20 TETRAHYDROCANNABINOL (THC) USE DISORDER, SEVERE, DEPENDENCE (HCC): Status: ACTIVE | Noted: 2020-01-07

## 2020-01-07 PROBLEM — E87.1 HYPONATREMIA: Status: ACTIVE | Noted: 2020-01-07

## 2020-01-07 LAB
ALBUMIN SERPL BCP-MCNC: 5.6 G/DL (ref 3.2–4.9)
ALBUMIN/GLOB SERPL: 1.7 G/DL
ALP SERPL-CCNC: 101 U/L (ref 30–99)
ALT SERPL-CCNC: 30 U/L (ref 2–50)
AMPHET UR QL SCN: NEGATIVE
ANION GAP SERPL CALC-SCNC: 26 MMOL/L (ref 0–11.9)
APPEARANCE UR: ABNORMAL
AST SERPL-CCNC: 23 U/L (ref 12–45)
BACTERIA #/AREA URNS HPF: ABNORMAL /HPF
BARBITURATES UR QL SCN: NEGATIVE
BASOPHILS # BLD AUTO: 0.2 % (ref 0–1.8)
BASOPHILS # BLD: 0.04 K/UL (ref 0–0.12)
BENZODIAZ UR QL SCN: NEGATIVE
BILIRUB SERPL-MCNC: 0.6 MG/DL (ref 0.1–1.5)
BILIRUB UR QL STRIP.AUTO: ABNORMAL
BUN SERPL-MCNC: 55 MG/DL (ref 8–22)
BZE UR QL SCN: NEGATIVE
CALCIUM SERPL-MCNC: 11 MG/DL (ref 8.4–10.2)
CANNABINOIDS UR QL SCN: POSITIVE
CASTS URNS QL MICRO: ABNORMAL /LPF
CHLORIDE SERPL-SCNC: 87 MMOL/L (ref 96–112)
CO2 SERPL-SCNC: 20 MMOL/L (ref 20–33)
COLOR UR: YELLOW
CREAT SERPL-MCNC: 2.75 MG/DL (ref 0.5–1.4)
EOSINOPHIL # BLD AUTO: 0.01 K/UL (ref 0–0.51)
EOSINOPHIL NFR BLD: 0 % (ref 0–6.9)
ERYTHROCYTE [DISTWIDTH] IN BLOOD BY AUTOMATED COUNT: 35.8 FL (ref 35.9–50)
FLUAV RNA SPEC QL NAA+PROBE: NEGATIVE
FLUBV RNA SPEC QL NAA+PROBE: NEGATIVE
GLOBULIN SER CALC-MCNC: 3.3 G/DL (ref 1.9–3.5)
GLUCOSE SERPL-MCNC: 143 MG/DL (ref 65–99)
GLUCOSE UR STRIP.AUTO-MCNC: NEGATIVE MG/DL
HCT VFR BLD AUTO: 52.2 % (ref 42–52)
HGB BLD-MCNC: 18.8 G/DL (ref 14–18)
HYALINE CASTS #/AREA URNS LPF: ABNORMAL /LPF
IMM GRANULOCYTES # BLD AUTO: 0.12 K/UL (ref 0–0.11)
IMM GRANULOCYTES NFR BLD AUTO: 0.6 % (ref 0–0.9)
INR PPP: 0.89 (ref 0.87–1.13)
KETONES UR STRIP.AUTO-MCNC: 15 MG/DL
LACTATE BLD-SCNC: 1.7 MMOL/L (ref 0.5–2)
LACTATE BLD-SCNC: 1.8 MMOL/L (ref 0.5–2)
LACTATE BLD-SCNC: 2.6 MMOL/L (ref 0.5–2)
LEUKOCYTE ESTERASE UR QL STRIP.AUTO: NEGATIVE
LIPASE SERPL-CCNC: 25 U/L (ref 7–58)
LYMPHOCYTES # BLD AUTO: 3.1 K/UL (ref 1–4.8)
LYMPHOCYTES NFR BLD: 15.4 % (ref 22–41)
MCH RBC QN AUTO: 29.1 PG (ref 27–33)
MCHC RBC AUTO-ENTMCNC: 36 G/DL (ref 33.7–35.3)
MCV RBC AUTO: 80.8 FL (ref 81.4–97.8)
METHADONE UR QL SCN: NEGATIVE
MICRO URNS: ABNORMAL
MONOCYTES # BLD AUTO: 2.19 K/UL (ref 0–0.85)
MONOCYTES NFR BLD AUTO: 10.9 % (ref 0–13.4)
MUCOUS THREADS #/AREA URNS HPF: ABNORMAL /HPF
NEUTROPHILS # BLD AUTO: 14.71 K/UL (ref 1.82–7.42)
NEUTROPHILS NFR BLD: 72.9 % (ref 44–72)
NITRITE UR QL STRIP.AUTO: NEGATIVE
NRBC # BLD AUTO: 0 K/UL
NRBC BLD-RTO: 0 /100 WBC
OPIATES UR QL SCN: NEGATIVE
OXYCODONE UR QL SCN: NEGATIVE
PCP UR QL SCN: NEGATIVE
PH UR STRIP.AUTO: 5 [PH] (ref 5–8)
PLATELET # BLD AUTO: 174 K/UL (ref 164–446)
PMV BLD AUTO: 10.7 FL (ref 9–12.9)
POTASSIUM SERPL-SCNC: 3.7 MMOL/L (ref 3.6–5.5)
PROPOXYPH UR QL SCN: NEGATIVE
PROT SERPL-MCNC: 8.9 G/DL (ref 6–8.2)
PROT UR QL STRIP: 100 MG/DL
PROTHROMBIN TIME: 12.1 SEC (ref 12–14.6)
RBC # BLD AUTO: 6.46 M/UL (ref 4.7–6.1)
RBC # URNS HPF: ABNORMAL /HPF
RBC UR QL AUTO: ABNORMAL
SODIUM SERPL-SCNC: 133 MMOL/L (ref 135–145)
SP GR UR REFRACTOMETRY: 1.02
UNIDENT CRYS URNS QL MICRO: ABNORMAL /HPF
WBC # BLD AUTO: 20.2 K/UL (ref 4.8–10.8)
WBC #/AREA URNS HPF: ABNORMAL /HPF

## 2020-01-07 PROCEDURE — 770006 HCHG ROOM/CARE - MED/SURG/GYN SEMI*

## 2020-01-07 PROCEDURE — 36415 COLL VENOUS BLD VENIPUNCTURE: CPT

## 2020-01-07 PROCEDURE — 700102 HCHG RX REV CODE 250 W/ 637 OVERRIDE(OP): Performed by: HOSPITALIST

## 2020-01-07 PROCEDURE — 700111 HCHG RX REV CODE 636 W/ 250 OVERRIDE (IP): Performed by: HOSPITALIST

## 2020-01-07 PROCEDURE — 700105 HCHG RX REV CODE 258: Performed by: HOSPITALIST

## 2020-01-07 PROCEDURE — 87502 INFLUENZA DNA AMP PROBE: CPT

## 2020-01-07 PROCEDURE — 74176 CT ABD & PELVIS W/O CONTRAST: CPT

## 2020-01-07 PROCEDURE — 80307 DRUG TEST PRSMV CHEM ANLYZR: CPT

## 2020-01-07 PROCEDURE — 99223 1ST HOSP IP/OBS HIGH 75: CPT | Performed by: HOSPITALIST

## 2020-01-07 PROCEDURE — A9270 NON-COVERED ITEM OR SERVICE: HCPCS | Performed by: HOSPITALIST

## 2020-01-07 PROCEDURE — 85025 COMPLETE CBC W/AUTO DIFF WBC: CPT

## 2020-01-07 PROCEDURE — 700101 HCHG RX REV CODE 250: Performed by: HOSPITALIST

## 2020-01-07 PROCEDURE — 83605 ASSAY OF LACTIC ACID: CPT

## 2020-01-07 PROCEDURE — 80053 COMPREHEN METABOLIC PANEL: CPT

## 2020-01-07 PROCEDURE — 83690 ASSAY OF LIPASE: CPT

## 2020-01-07 PROCEDURE — 700105 HCHG RX REV CODE 258: Performed by: EMERGENCY MEDICINE

## 2020-01-07 PROCEDURE — 87040 BLOOD CULTURE FOR BACTERIA: CPT | Mod: 91

## 2020-01-07 PROCEDURE — 81001 URINALYSIS AUTO W/SCOPE: CPT

## 2020-01-07 PROCEDURE — 94760 N-INVAS EAR/PLS OXIMETRY 1: CPT

## 2020-01-07 PROCEDURE — 99285 EMERGENCY DEPT VISIT HI MDM: CPT

## 2020-01-07 PROCEDURE — 96375 TX/PRO/DX INJ NEW DRUG ADDON: CPT

## 2020-01-07 PROCEDURE — 700111 HCHG RX REV CODE 636 W/ 250 OVERRIDE (IP): Performed by: EMERGENCY MEDICINE

## 2020-01-07 PROCEDURE — 96374 THER/PROPH/DIAG INJ IV PUSH: CPT

## 2020-01-07 PROCEDURE — 85610 PROTHROMBIN TIME: CPT

## 2020-01-07 RX ORDER — POLYETHYLENE GLYCOL 3350 17 G/17G
1 POWDER, FOR SOLUTION ORAL
Status: DISCONTINUED | OUTPATIENT
Start: 2020-01-07 | End: 2020-01-10 | Stop reason: HOSPADM

## 2020-01-07 RX ORDER — PANTOPRAZOLE SODIUM 40 MG/1
40 TABLET, DELAYED RELEASE ORAL 2 TIMES DAILY
COMMUNITY
End: 2020-01-20 | Stop reason: SDUPTHER

## 2020-01-07 RX ORDER — HYDROMORPHONE HYDROCHLORIDE 1 MG/ML
0.5 INJECTION, SOLUTION INTRAMUSCULAR; INTRAVENOUS; SUBCUTANEOUS EVERY 4 HOURS PRN
Status: DISCONTINUED | OUTPATIENT
Start: 2020-01-07 | End: 2020-01-10 | Stop reason: HOSPADM

## 2020-01-07 RX ORDER — ENALAPRILAT 1.25 MG/ML
1.25 INJECTION INTRAVENOUS EVERY 6 HOURS PRN
Status: DISCONTINUED | OUTPATIENT
Start: 2020-01-07 | End: 2020-01-10 | Stop reason: HOSPADM

## 2020-01-07 RX ORDER — PROMETHAZINE HYDROCHLORIDE 25 MG/1
12.5-25 TABLET ORAL EVERY 4 HOURS PRN
Status: DISCONTINUED | OUTPATIENT
Start: 2020-01-07 | End: 2020-01-10 | Stop reason: HOSPADM

## 2020-01-07 RX ORDER — BISACODYL 10 MG
10 SUPPOSITORY, RECTAL RECTAL
Status: DISCONTINUED | OUTPATIENT
Start: 2020-01-07 | End: 2020-01-10 | Stop reason: HOSPADM

## 2020-01-07 RX ORDER — SODIUM CHLORIDE 9 MG/ML
1000 INJECTION, SOLUTION INTRAVENOUS ONCE
Status: COMPLETED | OUTPATIENT
Start: 2020-01-07 | End: 2020-01-07

## 2020-01-07 RX ORDER — DIPHENOXYLATE HYDROCHLORIDE AND ATROPINE SULFATE 2.5; .025 MG/1; MG/1
1 TABLET ORAL 4 TIMES DAILY
Status: DISCONTINUED | OUTPATIENT
Start: 2020-01-07 | End: 2020-01-10 | Stop reason: HOSPADM

## 2020-01-07 RX ORDER — OMEPRAZOLE 20 MG/1
20 CAPSULE, DELAYED RELEASE ORAL 2 TIMES DAILY
Status: DISCONTINUED | OUTPATIENT
Start: 2020-01-07 | End: 2020-01-10 | Stop reason: HOSPADM

## 2020-01-07 RX ORDER — SODIUM CHLORIDE, SODIUM LACTATE, POTASSIUM CHLORIDE, CALCIUM CHLORIDE 600; 310; 30; 20 MG/100ML; MG/100ML; MG/100ML; MG/100ML
INJECTION, SOLUTION INTRAVENOUS CONTINUOUS
Status: DISCONTINUED | OUTPATIENT
Start: 2020-01-07 | End: 2020-01-10 | Stop reason: HOSPADM

## 2020-01-07 RX ORDER — ONDANSETRON 2 MG/ML
4 INJECTION INTRAMUSCULAR; INTRAVENOUS EVERY 4 HOURS PRN
Status: DISCONTINUED | OUTPATIENT
Start: 2020-01-07 | End: 2020-01-10 | Stop reason: HOSPADM

## 2020-01-07 RX ORDER — PROCHLORPERAZINE EDISYLATE 5 MG/ML
5-10 INJECTION INTRAMUSCULAR; INTRAVENOUS EVERY 4 HOURS PRN
Status: DISCONTINUED | OUTPATIENT
Start: 2020-01-07 | End: 2020-01-10 | Stop reason: HOSPADM

## 2020-01-07 RX ORDER — ONDANSETRON 4 MG/1
4 TABLET, ORALLY DISINTEGRATING ORAL EVERY 4 HOURS PRN
Status: DISCONTINUED | OUTPATIENT
Start: 2020-01-07 | End: 2020-01-10 | Stop reason: HOSPADM

## 2020-01-07 RX ORDER — ONDANSETRON 2 MG/ML
4 INJECTION INTRAMUSCULAR; INTRAVENOUS ONCE
Status: COMPLETED | OUTPATIENT
Start: 2020-01-07 | End: 2020-01-07

## 2020-01-07 RX ORDER — PROMETHAZINE HYDROCHLORIDE 25 MG/1
12.5-25 SUPPOSITORY RECTAL EVERY 4 HOURS PRN
Status: DISCONTINUED | OUTPATIENT
Start: 2020-01-07 | End: 2020-01-10 | Stop reason: HOSPADM

## 2020-01-07 RX ORDER — PANTOPRAZOLE SODIUM 40 MG/1
40 TABLET, DELAYED RELEASE ORAL 2 TIMES DAILY
Status: DISCONTINUED | OUTPATIENT
Start: 2020-01-07 | End: 2020-01-07

## 2020-01-07 RX ORDER — AMOXICILLIN 250 MG
2 CAPSULE ORAL 2 TIMES DAILY
Status: DISCONTINUED | OUTPATIENT
Start: 2020-01-07 | End: 2020-01-10 | Stop reason: HOSPADM

## 2020-01-07 RX ORDER — ACETAMINOPHEN 325 MG/1
650 TABLET ORAL EVERY 6 HOURS PRN
Status: DISCONTINUED | OUTPATIENT
Start: 2020-01-07 | End: 2020-01-10 | Stop reason: HOSPADM

## 2020-01-07 RX ADMIN — OMEPRAZOLE 20 MG: 20 CAPSULE, DELAYED RELEASE ORAL at 17:48

## 2020-01-07 RX ADMIN — ACETAMINOPHEN 650 MG: 325 TABLET, FILM COATED ORAL at 16:41

## 2020-01-07 RX ADMIN — SODIUM CHLORIDE 1000 ML: 9 INJECTION, SOLUTION INTRAVENOUS at 14:29

## 2020-01-07 RX ADMIN — FENTANYL CITRATE 100 MCG: 50 INJECTION INTRAMUSCULAR; INTRAVENOUS at 14:29

## 2020-01-07 RX ADMIN — PROCHLORPERAZINE EDISYLATE 10 MG: 5 INJECTION INTRAMUSCULAR; INTRAVENOUS at 16:41

## 2020-01-07 RX ADMIN — ONDANSETRON 4 MG: 2 INJECTION INTRAMUSCULAR; INTRAVENOUS at 14:29

## 2020-01-07 RX ADMIN — ONDANSETRON HYDROCHLORIDE 4 MG: 2 SOLUTION INTRAMUSCULAR; INTRAVENOUS at 21:43

## 2020-01-07 RX ADMIN — HYDROMORPHONE HYDROCHLORIDE 0.5 MG: 1 INJECTION, SOLUTION INTRAMUSCULAR; INTRAVENOUS; SUBCUTANEOUS at 16:55

## 2020-01-07 RX ADMIN — SODIUM CHLORIDE, POTASSIUM CHLORIDE, SODIUM LACTATE AND CALCIUM CHLORIDE: 600; 310; 30; 20 INJECTION, SOLUTION INTRAVENOUS at 16:42

## 2020-01-07 RX ADMIN — ACETAMINOPHEN 650 MG: 325 TABLET, FILM COATED ORAL at 23:26

## 2020-01-07 RX ADMIN — ENOXAPARIN SODIUM 40 MG: 100 INJECTION SUBCUTANEOUS at 16:55

## 2020-01-07 RX ADMIN — CEFTRIAXONE SODIUM 2 G: 2 INJECTION, POWDER, FOR SOLUTION INTRAMUSCULAR; INTRAVENOUS at 17:47

## 2020-01-07 RX ADMIN — METRONIDAZOLE 500 MG: 500 INJECTION, SOLUTION INTRAVENOUS at 16:55

## 2020-01-07 RX ADMIN — METRONIDAZOLE 500 MG: 500 INJECTION, SOLUTION INTRAVENOUS at 21:34

## 2020-01-07 RX ADMIN — DIPHENOXYLATE HYDROCHLORIDE AND ATROPINE SULFATE 1 TABLET: 2.5; .025 TABLET ORAL at 16:41

## 2020-01-07 ASSESSMENT — ENCOUNTER SYMPTOMS
NAUSEA: 1
DIZZINESS: 0
TINGLING: 0
HEADACHES: 0
ABDOMINAL PAIN: 1
COUGH: 0
SORE THROAT: 0
SHORTNESS OF BREATH: 0
VOMITING: 1
BLURRED VISION: 0
NECK PAIN: 0
INSOMNIA: 0
BACK PAIN: 0
DIARRHEA: 1
DEPRESSION: 0
CHILLS: 0
EYE PAIN: 0
FEVER: 0
PALPITATIONS: 0

## 2020-01-07 ASSESSMENT — COGNITIVE AND FUNCTIONAL STATUS - GENERAL
DAILY ACTIVITIY SCORE: 18
WALKING IN HOSPITAL ROOM: A LITTLE
HELP NEEDED FOR BATHING: A LITTLE
CLIMB 3 TO 5 STEPS WITH RAILING: A LITTLE
MOVING FROM LYING ON BACK TO SITTING ON SIDE OF FLAT BED: A LITTLE
EATING MEALS: A LITTLE
SUGGESTED CMS G CODE MODIFIER MOBILITY: CK
TURNING FROM BACK TO SIDE WHILE IN FLAT BAD: A LITTLE
MOVING TO AND FROM BED TO CHAIR: A LITTLE
DRESSING REGULAR UPPER BODY CLOTHING: A LITTLE
DRESSING REGULAR LOWER BODY CLOTHING: A LITTLE
MOBILITY SCORE: 18
SUGGESTED CMS G CODE MODIFIER DAILY ACTIVITY: CK
PERSONAL GROOMING: A LITTLE
TOILETING: A LITTLE
STANDING UP FROM CHAIR USING ARMS: A LITTLE

## 2020-01-07 ASSESSMENT — LIFESTYLE VARIABLES
HOW MANY TIMES IN THE PAST YEAR HAVE YOU HAD 5 OR MORE DRINKS IN A DAY: 0
HAVE PEOPLE ANNOYED YOU BY CRITICIZING YOUR DRINKING: NO
ALCOHOL_USE: NO
CONSUMPTION TOTAL: NEGATIVE
EVER HAD A DRINK FIRST THING IN THE MORNING TO STEADY YOUR NERVES TO GET RID OF A HANGOVER: NO
HAVE YOU EVER FELT YOU SHOULD CUT DOWN ON YOUR DRINKING: NO
AVERAGE NUMBER OF DAYS PER WEEK YOU HAVE A DRINK CONTAINING ALCOHOL: 0
EVER_SMOKED: YES
TOTAL SCORE: 0
EVER FELT BAD OR GUILTY ABOUT YOUR DRINKING: NO
ON A TYPICAL DAY WHEN YOU DRINK ALCOHOL HOW MANY DRINKS DO YOU HAVE: 0
DOES PATIENT WANT TO STOP DRINKING: NO
TOTAL SCORE: 0
TOTAL SCORE: 0

## 2020-01-07 ASSESSMENT — COPD QUESTIONNAIRES
HAVE YOU SMOKED AT LEAST 100 CIGARETTES IN YOUR ENTIRE LIFE: NO/DON'T KNOW
COPD SCREENING SCORE: 0
DO YOU EVER COUGH UP ANY MUCUS OR PHLEGM?: NO/ONLY WITH OCCASIONAL COLDS OR INFECTIONS
DURING THE PAST 4 WEEKS HOW MUCH DID YOU FEEL SHORT OF BREATH: NONE/LITTLE OF THE TIME
IN THE PAST 12 MONTHS DO YOU DO LESS THAN YOU USED TO BECAUSE OF YOUR BREATHING PROBLEMS: DISAGREE/UNSURE

## 2020-01-07 ASSESSMENT — PATIENT HEALTH QUESTIONNAIRE - PHQ9
SUM OF ALL RESPONSES TO PHQ9 QUESTIONS 1 AND 2: 0
1. LITTLE INTEREST OR PLEASURE IN DOING THINGS: NOT AT ALL
2. FEELING DOWN, DEPRESSED, IRRITABLE, OR HOPELESS: NOT AT ALL

## 2020-01-07 NOTE — ASSESSMENT & PLAN NOTE
This is Severe Sepsis Present on admission  SIRS criteria identified on my evaluation include: Tachycardia, with heart rate greater than 90 BPM and Leukocyosis, with WBC greater than 12,000  Source of infection is GI  Clinical indicators of end organ dysfunction include Creatinine >2.0 (Without ESRD or CKD)  Sepsis protocol initiated  Fluid resuscitation ordered per protocol  IV antibiotics as appropriate for source of sepsis  Reassessment: I have reassessed the patient's hemodynamic status  End organ dysfunction include(s):  Acute kidney failure     Infection is currently suspected as a possible gastroenteritis.

## 2020-01-07 NOTE — ED PROVIDER NOTES
ED Provider Note    CHIEF COMPLAINT  Chief Complaint   Patient presents with   • Abdominal Pain     pt c/o RUQ pain x 1 day that intermittantly travels throughout abdomen   • Nausea/Vomiting/Diarrhea     Pt c/o n/v/d since yesterday. Pt states he is unable to keep anything down. pt also c/o bruising to buttocks       HPI  Jerome Cardoza Jr. is a 44 y.o. male who presents to the emergency department through triage for abdominal pain, nausea, vomiting and diarrhea.  Patient describes symptoms since Sunday, now day 3.  To numerous to count numbers of vomiting, multiple episodes of nonbloody mucoid diarrhea as well.  Persistent nausea.  Severe abdominal pain, epigastric, right upper quadrant as well.  Previous cholecystectomy 5 to 7 years ago.  Denies history of similar symptoms previously.  Denies sick contacts or recent travel.  Denies chronic alcohol use.    REVIEW OF SYSTEMS  See HPI for further details. All other systems are negative.     PAST MEDICAL HISTORY   has a past medical history of Bronchitis, Chickenpox, GERD (gastroesophageal reflux disease), IBS (irritable bowel syndrome), Indigestion, Influenza, Pneumonia, Pulmonary embolism (HCC), Renal disorder, and Sleep apnea.    SOCIAL HISTORY  Social History     Tobacco Use   • Smoking status: Current Every Day Smoker     Packs/day: 0.50     Years: 22.00     Pack years: 11.00     Types: Cigarettes   • Smokeless tobacco: Never Used   Substance and Sexual Activity   • Alcohol use: No   • Drug use: Yes     Types: Inhaled     Comment: marijuana occ   • Sexual activity: Yes     Partners: Female       SURGICAL HISTORY   has a past surgical history that includes asher by laparoscopy; tonsillectomy; other orthopedic surgery; knee replacement, total (Left, 10/2017); and tonsillectomy.    CURRENT MEDICATIONS  Home Medications     Reviewed by Lj Grant (Pharmacy Tech) on 01/07/20 at 1413  Med List Status: Complete   Medication Last Dose Status  "  pantoprazole (PROTONIX) 40 MG Tablet Delayed Response 1/5/2020 Active                ALLERGIES  Allergies   Allergen Reactions   • Fish Anaphylaxis     [place] and tuna   • Hops Oil Anaphylaxis   • Barley Grass Anaphylaxis   • Green Beans Shortness of Breath and Nausea   • Nicoderm [Nicotine] Rash, Itching and Swelling     Blisters noted to patch site   • Turkey Shortness of Breath and Nausea       PHYSICAL EXAM  VITAL SIGNS: /91   Pulse (!) 102   Temp 36.4 °C (97.5 °F) (Temporal)   Resp 20   Ht 1.702 m (5' 7\")   Wt 81 kg (178 lb 9.2 oz)   SpO2 97%   BMI 27.97 kg/m²   Pulse ox interpretation: I interpret this pulse ox as normal.  Constitutional: Alert.  Mild distress secondary discomfort.  HENT: Normocephalic, atraumatic. Bilateral external ears normal, Nose normal.  Dry lips and mucous membranes.    Eyes: Pupils are equal and reactive, Conjunctiva normal.   Neck: Normal range of motion, Supple  Lymphatic: No lymphadenopathy noted.   Cardiovascular: Regular rate and rhythm, no murmurs. Distal pulses intact.   Thorax & Lungs: Normal breath sounds.  No wheezing/rales/ronchi.  Tachypnea without other increased work of breathing, clipped speech or retractions.   Abdomen: Soft, slightly distended.  Tender to palpation diffusely, greatest across the upper abdomen, in the right upper quadrant epigastric region.  No rebound, guarding or peritonitis.  No palpable pulsatile mass.  Skin: Warm, Dry, No erythema, No rash.   Musculoskeletal: Good range of motion in all major joints.  Neurologic: Alert and oriented x4.  Psychiatric: Affect normal, Judgment normal, Mood normal.       DIAGNOSTIC STUDIES / PROCEDURES    LABS  Results for orders placed or performed during the hospital encounter of 01/07/20   CBC WITH DIFFERENTIAL   Result Value Ref Range    WBC 20.2 (H) 4.8 - 10.8 K/uL    RBC 6.46 (H) 4.70 - 6.10 M/uL    Hemoglobin 18.8 (H) 14.0 - 18.0 g/dL    Hematocrit 52.2 (H) 42.0 - 52.0 %    MCV 80.8 (L) 81.4 - 97.8 " fL    MCH 29.1 27.0 - 33.0 pg    MCHC 36.0 (H) 33.7 - 35.3 g/dL    RDW 35.8 (L) 35.9 - 50.0 fL    Platelet Count 174 164 - 446 K/uL    MPV 10.7 9.0 - 12.9 fL    Neutrophils-Polys 72.90 (H) 44.00 - 72.00 %    Lymphocytes 15.40 (L) 22.00 - 41.00 %    Monocytes 10.90 0.00 - 13.40 %    Eosinophils 0.00 0.00 - 6.90 %    Basophils 0.20 0.00 - 1.80 %    Immature Granulocytes 0.60 0.00 - 0.90 %    Nucleated RBC 0.00 /100 WBC    Neutrophils (Absolute) 14.71 (H) 1.82 - 7.42 K/uL    Lymphs (Absolute) 3.10 1.00 - 4.80 K/uL    Monos (Absolute) 2.19 (H) 0.00 - 0.85 K/uL    Eos (Absolute) 0.01 0.00 - 0.51 K/uL    Baso (Absolute) 0.04 0.00 - 0.12 K/uL    Immature Granulocytes (abs) 0.12 (H) 0.00 - 0.11 K/uL    NRBC (Absolute) 0.00 K/uL   COMP METABOLIC PANEL   Result Value Ref Range    Sodium 133 (L) 135 - 145 mmol/L    Potassium 3.7 3.6 - 5.5 mmol/L    Chloride 87 (L) 96 - 112 mmol/L    Co2 20 20 - 33 mmol/L    Anion Gap 26.0 (H) 0.0 - 11.9    Glucose 143 (H) 65 - 99 mg/dL    Bun 55 (H) 8 - 22 mg/dL    Creatinine 2.75 (H) 0.50 - 1.40 mg/dL    Calcium 11.0 (H) 8.4 - 10.2 mg/dL    AST(SGOT) 23 12 - 45 U/L    ALT(SGPT) 30 2 - 50 U/L    Alkaline Phosphatase 101 (H) 30 - 99 U/L    Total Bilirubin 0.6 0.1 - 1.5 mg/dL    Albumin 5.6 (H) 3.2 - 4.9 g/dL    Total Protein 8.9 (H) 6.0 - 8.2 g/dL    Globulin 3.3 1.9 - 3.5 g/dL    A-G Ratio 1.7 g/dL   LIPASE   Result Value Ref Range    Lipase 25 7 - 58 U/L   ESTIMATED GFR   Result Value Ref Range    GFR If African American 30 (A) >60 mL/min/1.73 m 2    GFR If Non African American 25 (A) >60 mL/min/1.73 m 2   LACTIC ACID   Result Value Ref Range    Lactic Acid 2.6 (H) 0.5 - 2.0 mmol/L     RADIOLOGY  CT-ABDOMEN-PELVIS W/O   Final Result         1.  The gallbladder is surgically absent without biliary dilatation.   2.  There is no acute inflammatory process in the abdomen or pelvis.   3.  There is a small hiatal hernia.          COURSE & MEDICAL DECISION MAKING  Nursing notes and vital signs were  reviewed. (See chart for details)  The patients records were reviewed, history was obtained from the patient;     2:04 PM patient seen and evaluated at bedside.  Clinically ill-appearing.  Diffusely tender abdomen.  Upon lab review there after, patient with leukocytosis, WBC 20.2, leftward shift, bandemia.  Clinical dehydration, labs confirmed chloride 87, sodium 133.  Acute renal failure with BUN 55, creatinine 2.75.  Normal LFTs.  Hemodynamically stable, tachycardic but without hypotension.  IV fluid for clinical dehydration, fentanyl and Zofran for symptomatology.  CT scan added as well.  CT tech aware.    ED evaluation for nausea, vomiting, diarrhea abdominal pain is unrevealing.  Cannot exclude a viral gastrointestinal illness.  CT, without contrast due to new renal failure, is unrevealing.  Abdomen is tender but without guarding or peritonitis.  Hemodynamically stable, tachycardic but remains afebrile without hypotension.  No clinical evidence for sepsis although lactic acid and blood cultures were added after my initial evaluation.  IV fluid infusing for acute renal failure, dehydration and intractable nausea and vomiting.  Symptomatology somewhat controlled with fentanyl and Zofran now as well.  Influenza added as well.    Patient will be hospitalized for further evaluation and treatment.  He is aware the findings and agreeable to the disposition plan.    2:50 PM Dr. Gilbert is aware the patient agreeable to consultation.    FINAL IMPRESSION  (R10.84) Generalized abdominal pain  (R11.10,  R19.7) Vomiting and diarrhea  (E86.0) Dehydration  (N17.9) Acute renal failure, unspecified acute renal failure type (HCC)  (D72.829) Leukocytosis, unspecified type      Electronically signed by: Oralia Dee, 1/7/2020 2:09 PM      This dictation was created using voice recognition software. The accuracy of the dictation is limited to the abilities of the software. I expect there may be some errors of grammar and  possibly content. The nursing notes were reviewed and certain aspects of this information were incorporated into this note.

## 2020-01-07 NOTE — ASSESSMENT & PLAN NOTE
With THC hyperemesis that is recurrent. I suspect given his history that this is the etiology again of his admission. We discussed this. He says he just does not understand why this could be the case as he has been using his whole life. We have had multiple discussion on this with him in the past and he has been to see GI for his vomiting as well. He says he does not remember what GI told him. Consider neurontin. Continue protonix. Will check stool wbc and treat with empiric antibiotics for enteritis given his diarrhea history and that he meets severe sepsis criteria. He has no cdiff risk factors.

## 2020-01-07 NOTE — ED TRIAGE NOTES
Chief Complaint   Patient presents with   • Abdominal Pain     pt c/o RUQ pain x 1 day that intermittantly travels throughout abdomen   • Nausea/Vomiting/Diarrhea     Pt c/o n/v/d since yesterday. Pt states he is unable to keep anything down. pt also c/o bruising to buttocks   Pt assisted to triage in wheelchair. Pt alert and oriented. Triage process explained to pt. Pt told to alert staff with any changes in condition.

## 2020-01-07 NOTE — H&P
Hospital Medicine History & Physical Note    Date of Service  1/7/2020    Primary Care Physician  Andrea Machado P.A.-C.    Consultants  none    Code Status  full    Chief Complaint  Vomiting.     History of Presenting Illness  44 y.o. male who presented 1/7/2020 with recurrent nausea and vomiting. He says this started almost a week ago with vomiting. He denies having fevers or chills but says he has had some watery stools for the last 3 days without blood. He has had no sick contacts or travel. His abdominal pain started after multiple bouts of vomiting and is diffuse. It dose not radiate and he has had no improving or working factors that he knows of. He has a long history of recurrent admission for THC hyperemesis. He is still using marijuana regularly.       I discussed him with the ER physician.     Review of Systems  Review of Systems   Constitutional: Negative for chills and fever.   HENT: Negative for sore throat.    Eyes: Negative for blurred vision and pain.   Respiratory: Negative for cough and shortness of breath.    Cardiovascular: Negative for chest pain and palpitations.   Gastrointestinal: Positive for abdominal pain, diarrhea, nausea and vomiting.   Genitourinary: Negative for dysuria and urgency.   Musculoskeletal: Negative for back pain and neck pain.   Skin: Negative for itching and rash.   Neurological: Negative for dizziness, tingling and headaches.   Psychiatric/Behavioral: Negative for depression. The patient does not have insomnia.    All other systems reviewed and are negative.      Past Medical History   has a past medical history of Bronchitis, Chickenpox, GERD (gastroesophageal reflux disease), IBS (irritable bowel syndrome), Indigestion, Influenza, Pneumonia, Pulmonary embolism (HCC), Renal disorder, and Sleep apnea.    Surgical History   has a past surgical history that includes asher by laparoscopy; tonsillectomy; other orthopedic surgery; knee replacement, total (Left, 10/2017);  and tonsillectomy.     Family History  family history includes Heart Disease in his father, paternal grandfather, and paternal uncle; Hypertension in his father; Other in his mother; Sleep Apnea in his father and mother.     Social History   reports that he has been smoking cigarettes. He has a 11.00 pack-year smoking history. He has never used smokeless tobacco. He reports current drug use. Drug: Inhaled. He reports that he does not drink alcohol.   He is a daily marijuana smoker.     Allergies  Allergies   Allergen Reactions   • Fish Anaphylaxis     Terra [place] and tuna   • Hops Oil Anaphylaxis   • Barley Grass Anaphylaxis   • Green Beans Shortness of Breath and Nausea   • Nicoderm [Nicotine] Rash, Itching and Swelling     Blisters noted to patch site   • Turkey Shortness of Breath and Nausea       Medications  Prior to Admission Medications   Prescriptions Last Dose Informant Patient Reported? Taking?   pantoprazole (PROTONIX) 40 MG Tablet Delayed Response 1/5/2020 at PM Patient Yes Yes   Sig: Take 40 mg by mouth 2 times a day.      Facility-Administered Medications: None       Physical Exam  Temp:  [36.4 °C (97.5 °F)] 36.4 °C (97.5 °F)  Pulse:  [102-123] 102  Resp:  [20] 20  BP: (136-137)/(91-93) 136/91  SpO2:  [95 %-97 %] 97 %    Physical Exam  Vitals signs and nursing note reviewed.   Constitutional:       General: He is not in acute distress.     Appearance: He is well-developed. He is not diaphoretic.      Comments: Patient seen and examined at the bedside. Plan discussed at bedside with the RN.    HENT:      Right Ear: External ear normal.      Left Ear: External ear normal.      Nose: Nose normal.   Eyes:      General: No scleral icterus.        Right eye: No discharge.         Left eye: No discharge.   Neck:      Vascular: No JVD.      Trachea: No tracheal deviation.   Cardiovascular:      Rate and Rhythm: Normal rate.      Heart sounds: Normal heart sounds. No murmur.   Pulmonary:      Effort: Pulmonary  effort is normal. No respiratory distress.      Breath sounds: Normal breath sounds. No wheezing or rales.   Abdominal:      General: Bowel sounds are normal. There is no distension.      Palpations: Abdomen is soft.      Tenderness: There is no tenderness. There is no guarding.      Comments: Diffuse TTP. No guarding or rebound.    Musculoskeletal:         General: No tenderness.   Skin:     General: Skin is warm and dry.      Findings: No erythema.      Comments: Several ecchymoses less than 1cm in diameter on lower buttocks.    Neurological:      Mental Status: He is alert and oriented to person, place, and time.   Psychiatric:         Behavior: Behavior normal.         Laboratory:  Recent Labs     01/07/20  1302   WBC 20.2*   RBC 6.46*   HEMOGLOBIN 18.8*   HEMATOCRIT 52.2*   MCV 80.8*   MCH 29.1   MCHC 36.0*   RDW 35.8*   PLATELETCT 174   MPV 10.7     Recent Labs     01/07/20  1302   SODIUM 133*   POTASSIUM 3.7   CHLORIDE 87*   CO2 20   GLUCOSE 143*   BUN 55*   CREATININE 2.75*   CALCIUM 11.0*     Recent Labs     01/07/20  1302   ALTSGPT 30   ASTSGOT 23   ALKPHOSPHAT 101*   TBILIRUBIN 0.6   LIPASE 25   GLUCOSE 143*         No results for input(s): NTPROBNP in the last 72 hours.      No results for input(s): TROPONINT in the last 72 hours.    Urinalysis:    No results found     Imaging:  CT-ABDOMEN-PELVIS W/O   Final Result         1.  The gallbladder is surgically absent without biliary dilatation.   2.  There is no acute inflammatory process in the abdomen or pelvis.   3.  There is a small hiatal hernia.            Assessment/Plan:  I anticipate this patient will require at least two midnights for appropriate medical management, necessitating inpatient admission.    * Tetrahydrocannabinol (THC) use disorder, severe, dependence (HCC)  Assessment & Plan  With THC hyperemesis that is recurrent. I suspect given his history that this is the etiology again of his admission. We discussed this. He says he just does not  understand why this could be the case as he has been using his whole life. We have had multiple discussion on this with him in the past and he has been to see GI for his vomiting as well. He says he does not remember what GI told him. Consider neurontin. Continue protonix. Will check stool wbc and treat with empiric antibiotics for enteritis given his diarrhea history and that he meets severe sepsis criteria. He has no cdiff risk factors.     Hyponatremia  Assessment & Plan  hypovolemic- IV fluids    Metabolic acidosis  Assessment & Plan  IV fluids trial. Trend labs.     Severe sepsis (HCC)- (present on admission)  Assessment & Plan  This is Severe Sepsis Present on admission  SIRS criteria identified on my evaluation include: Tachycardia, with heart rate greater than 90 BPM and Leukocyosis, with WBC greater than 12,000  Source of infection is GI  Clinical indicators of end organ dysfunction include Creatinine >2.0 (Without ESRD or CKD)  Sepsis protocol initiated  Fluid resuscitation ordered per protocol  IV antibiotics as appropriate for source of sepsis  Reassessment: I have reassessed the patient's hemodynamic status  End organ dysfunction include(s):  Acute kidney failure     Infection is currently suspected as a possible gastroenteritis.         Dehydration  Assessment & Plan  Severe - IV fluids and trend labs. Control vomiting.     ARF (acute renal failure) (CMS-HCC)  Assessment & Plan  Severe. Pre-renal. IV fluids and control n/v/d      VTE prophylaxis: scd

## 2020-01-07 NOTE — ED NOTES
Hospitalist in to evaluate patient for further treatment. Will continue to monitor  Flu swab collected and walked to lab. Pt reports he is feeling a little improvement with pain. Easier to breath.

## 2020-01-08 LAB
ALBUMIN SERPL BCP-MCNC: 4.4 G/DL (ref 3.2–4.9)
ALBUMIN/GLOB SERPL: 1.8 G/DL
ALP SERPL-CCNC: 74 U/L (ref 30–99)
ALT SERPL-CCNC: 23 U/L (ref 2–50)
ANION GAP SERPL CALC-SCNC: 18 MMOL/L (ref 0–11.9)
AST SERPL-CCNC: 23 U/L (ref 12–45)
BASOPHILS # BLD AUTO: 0.3 % (ref 0–1.8)
BASOPHILS # BLD: 0.03 K/UL (ref 0–0.12)
BILIRUB SERPL-MCNC: 0.5 MG/DL (ref 0.1–1.5)
BUN SERPL-MCNC: 39 MG/DL (ref 8–22)
CALCIUM SERPL-MCNC: 9 MG/DL (ref 8.4–10.2)
CHLORIDE SERPL-SCNC: 96 MMOL/L (ref 96–112)
CO2 SERPL-SCNC: 22 MMOL/L (ref 20–33)
CREAT SERPL-MCNC: 1.25 MG/DL (ref 0.5–1.4)
EOSINOPHIL # BLD AUTO: 0.05 K/UL (ref 0–0.51)
EOSINOPHIL NFR BLD: 0.4 % (ref 0–6.9)
ERYTHROCYTE [DISTWIDTH] IN BLOOD BY AUTOMATED COUNT: 38.3 FL (ref 35.9–50)
GLOBULIN SER CALC-MCNC: 2.4 G/DL (ref 1.9–3.5)
GLUCOSE SERPL-MCNC: 99 MG/DL (ref 65–99)
HCT VFR BLD AUTO: 42.9 % (ref 42–52)
HGB BLD-MCNC: 15.1 G/DL (ref 14–18)
IMM GRANULOCYTES # BLD AUTO: 0.03 K/UL (ref 0–0.11)
IMM GRANULOCYTES NFR BLD AUTO: 0.3 % (ref 0–0.9)
LACTATE BLD-SCNC: 1.1 MMOL/L (ref 0.5–2)
LYMPHOCYTES # BLD AUTO: 3.37 K/UL (ref 1–4.8)
LYMPHOCYTES NFR BLD: 28.6 % (ref 22–41)
MAGNESIUM SERPL-MCNC: 2.2 MG/DL (ref 1.5–2.5)
MCH RBC QN AUTO: 29.8 PG (ref 27–33)
MCHC RBC AUTO-ENTMCNC: 35.2 G/DL (ref 33.7–35.3)
MCV RBC AUTO: 84.8 FL (ref 81.4–97.8)
MONOCYTES # BLD AUTO: 1.66 K/UL (ref 0–0.85)
MONOCYTES NFR BLD AUTO: 14.1 % (ref 0–13.4)
NEUTROPHILS # BLD AUTO: 6.66 K/UL (ref 1.82–7.42)
NEUTROPHILS NFR BLD: 56.3 % (ref 44–72)
NRBC # BLD AUTO: 0 K/UL
NRBC BLD-RTO: 0 /100 WBC
PLATELET # BLD AUTO: 238 K/UL (ref 164–446)
PMV BLD AUTO: 10.2 FL (ref 9–12.9)
POTASSIUM SERPL-SCNC: 3.5 MMOL/L (ref 3.6–5.5)
PROT SERPL-MCNC: 6.8 G/DL (ref 6–8.2)
RBC # BLD AUTO: 5.06 M/UL (ref 4.7–6.1)
SODIUM SERPL-SCNC: 136 MMOL/L (ref 135–145)
WBC # BLD AUTO: 11.8 K/UL (ref 4.8–10.8)
WBC STL QL MICRO: NORMAL

## 2020-01-08 PROCEDURE — 700102 HCHG RX REV CODE 250 W/ 637 OVERRIDE(OP): Performed by: INTERNAL MEDICINE

## 2020-01-08 PROCEDURE — 700105 HCHG RX REV CODE 258: Performed by: HOSPITALIST

## 2020-01-08 PROCEDURE — 770006 HCHG ROOM/CARE - MED/SURG/GYN SEMI*

## 2020-01-08 PROCEDURE — 700111 HCHG RX REV CODE 636 W/ 250 OVERRIDE (IP): Performed by: HOSPITALIST

## 2020-01-08 PROCEDURE — 99406 BEHAV CHNG SMOKING 3-10 MIN: CPT

## 2020-01-08 PROCEDURE — 83735 ASSAY OF MAGNESIUM: CPT

## 2020-01-08 PROCEDURE — A9270 NON-COVERED ITEM OR SERVICE: HCPCS | Performed by: INTERNAL MEDICINE

## 2020-01-08 PROCEDURE — 99407 BEHAV CHNG SMOKING > 10 MIN: CPT | Performed by: INTERNAL MEDICINE

## 2020-01-08 PROCEDURE — 83605 ASSAY OF LACTIC ACID: CPT

## 2020-01-08 PROCEDURE — 700101 HCHG RX REV CODE 250: Performed by: HOSPITALIST

## 2020-01-08 PROCEDURE — 89055 LEUKOCYTE ASSESSMENT FECAL: CPT

## 2020-01-08 PROCEDURE — 700102 HCHG RX REV CODE 250 W/ 637 OVERRIDE(OP): Performed by: HOSPITALIST

## 2020-01-08 PROCEDURE — 99233 SBSQ HOSP IP/OBS HIGH 50: CPT | Mod: 25 | Performed by: INTERNAL MEDICINE

## 2020-01-08 PROCEDURE — 85025 COMPLETE CBC W/AUTO DIFF WBC: CPT

## 2020-01-08 PROCEDURE — A9270 NON-COVERED ITEM OR SERVICE: HCPCS | Performed by: HOSPITALIST

## 2020-01-08 PROCEDURE — 80053 COMPREHEN METABOLIC PANEL: CPT

## 2020-01-08 PROCEDURE — 36415 COLL VENOUS BLD VENIPUNCTURE: CPT

## 2020-01-08 RX ORDER — METRONIDAZOLE 500 MG/1
500 TABLET ORAL EVERY 8 HOURS
Status: DISCONTINUED | OUTPATIENT
Start: 2020-01-08 | End: 2020-01-08

## 2020-01-08 RX ORDER — POTASSIUM CHLORIDE 20 MEQ/1
40 TABLET, EXTENDED RELEASE ORAL ONCE
Status: COMPLETED | OUTPATIENT
Start: 2020-01-08 | End: 2020-01-08

## 2020-01-08 RX ORDER — DIPHENHYDRAMINE HCL 25 MG
25 TABLET ORAL NIGHTLY PRN
Status: DISCONTINUED | OUTPATIENT
Start: 2020-01-08 | End: 2020-01-10 | Stop reason: HOSPADM

## 2020-01-08 RX ADMIN — OMEPRAZOLE 20 MG: 20 CAPSULE, DELAYED RELEASE ORAL at 17:13

## 2020-01-08 RX ADMIN — CEFTRIAXONE SODIUM 2 G: 2 INJECTION, POWDER, FOR SOLUTION INTRAMUSCULAR; INTRAVENOUS at 05:48

## 2020-01-08 RX ADMIN — ENOXAPARIN SODIUM 40 MG: 100 INJECTION SUBCUTANEOUS at 05:49

## 2020-01-08 RX ADMIN — DIPHENOXYLATE HYDROCHLORIDE AND ATROPINE SULFATE 1 TABLET: 2.5; .025 TABLET ORAL at 17:13

## 2020-01-08 RX ADMIN — OMEPRAZOLE 20 MG: 20 CAPSULE, DELAYED RELEASE ORAL at 05:49

## 2020-01-08 RX ADMIN — DIPHENHYDRAMINE HCL 25 MG: 25 TABLET ORAL at 22:40

## 2020-01-08 RX ADMIN — SODIUM CHLORIDE, POTASSIUM CHLORIDE, SODIUM LACTATE AND CALCIUM CHLORIDE: 600; 310; 30; 20 INJECTION, SOLUTION INTRAVENOUS at 02:30

## 2020-01-08 RX ADMIN — DIPHENOXYLATE HYDROCHLORIDE AND ATROPINE SULFATE 1 TABLET: 2.5; .025 TABLET ORAL at 21:23

## 2020-01-08 RX ADMIN — SODIUM CHLORIDE, POTASSIUM CHLORIDE, SODIUM LACTATE AND CALCIUM CHLORIDE: 600; 310; 30; 20 INJECTION, SOLUTION INTRAVENOUS at 13:18

## 2020-01-08 RX ADMIN — POTASSIUM CHLORIDE 40 MEQ: 20 TABLET, EXTENDED RELEASE ORAL at 08:59

## 2020-01-08 RX ADMIN — ACETAMINOPHEN 650 MG: 325 TABLET, FILM COATED ORAL at 06:42

## 2020-01-08 RX ADMIN — METRONIDAZOLE 500 MG: 500 TABLET ORAL at 13:18

## 2020-01-08 RX ADMIN — DIPHENOXYLATE HYDROCHLORIDE AND ATROPINE SULFATE 1 TABLET: 2.5; .025 TABLET ORAL at 13:18

## 2020-01-08 RX ADMIN — METRONIDAZOLE 500 MG: 500 INJECTION, SOLUTION INTRAVENOUS at 06:47

## 2020-01-08 RX ADMIN — DIPHENOXYLATE HYDROCHLORIDE AND ATROPINE SULFATE 1 TABLET: 2.5; .025 TABLET ORAL at 08:59

## 2020-01-08 RX ADMIN — SODIUM CHLORIDE, POTASSIUM CHLORIDE, SODIUM LACTATE AND CALCIUM CHLORIDE: 600; 310; 30; 20 INJECTION, SOLUTION INTRAVENOUS at 21:23

## 2020-01-08 RX ADMIN — ONDANSETRON HYDROCHLORIDE 4 MG: 2 SOLUTION INTRAMUSCULAR; INTRAVENOUS at 06:42

## 2020-01-08 ASSESSMENT — ENCOUNTER SYMPTOMS
DIZZINESS: 0
SPEECH CHANGE: 0
CONSTIPATION: 0
FEVER: 0
HEARTBURN: 0
NECK PAIN: 0
SPUTUM PRODUCTION: 0
SEIZURES: 0
SHORTNESS OF BREATH: 0
NAUSEA: 0
BACK PAIN: 0
PND: 0
WEAKNESS: 1
WHEEZING: 0
DEPRESSION: 0
FALLS: 0
HEADACHES: 0
DIARRHEA: 0
COUGH: 0
EYE PAIN: 0
VOMITING: 1
PALPITATIONS: 0
ABDOMINAL PAIN: 1
TREMORS: 0
WEIGHT LOSS: 0
CHILLS: 0
BLURRED VISION: 0

## 2020-01-08 ASSESSMENT — LIFESTYLE VARIABLES: SUBSTANCE_ABUSE: 0

## 2020-01-08 NOTE — PROGRESS NOTES
Received bedside shift report regarding patient and assumed care. Patient is awake, calm and stable, currently positioned in bed for comfort and safety; call light within reach. Pt denies any N/V at this time. Pt reports minimal abdominal pain, pt was medicated with tylenol this am by NOC RN. Will continue to monitor.

## 2020-01-08 NOTE — PROGRESS NOTES
Pt AAO4. Denies any pain, SOB, chills. Reports nausea has been better. POC discussed w/ pt. including nausea, pain management, infection and safety precautions, mobilization. Pt verbalized understanding. IVF  Infusing. Will medicate for pain and nausea  Safety and comfort measures in place.  No additional needs at this time.

## 2020-01-08 NOTE — PROGRESS NOTES
Pt vomited out PO meds, gave IV compazine. Wife and pt requesting IV pain meds, will administer as ordered.

## 2020-01-08 NOTE — RESPIRATORY CARE
" COPD EDUCATION by COPD CLINICAL EDUCATOR  1/8/2020 at 10:10 AM by Shirley Fam     Patient reviewed by COPD education team. Smoking Cessation Intervention and education completed, 15 minutes spent on smoking cessation education with patient    Provided smoking cessation packet with \"Tips to Quit\" and flyer for \"Free Smoking Cessation Classes\".     "

## 2020-01-08 NOTE — CARE PLAN
Problem: Safety  Goal: Will remain free from injury  Outcome: PROGRESSING AS EXPECTED    Pt uses call light for assistance.  No unsafe behaviors noted. Hourly rounds in place.      Problem: Infection  Goal: Will remain free from infection  Outcome: PROGRESSING AS EXPECTED    Pt is on IV abt, no adverse reaction noted. VSS. Labs reviewed.     Problem: Bowel/Gastric:  Goal: Normal bowel function is maintained or improved  Outcome: PROGRESSING AS EXPECTED       Pt reports tolerable abdominal pain. No c/o N/V at this time. IV fluids infusing as ordered. Pt is on clear liquid diet.

## 2020-01-08 NOTE — PROGRESS NOTES
[facility] Medicine Daily Progress Note    Date of Service  1/8/2020    Chief Complaint  44 y.o. male admitted 1/7/2020 with nausea vomiting    Hospital Course    Past medical history of recurrent nausea vomiting presented with a similar problem.  Patient has previous history of THC positive.      Interval Problem Update  1/8.  Patient still complains of nausea vomiting.  But better.  Able to tolerate clear liquid diet this morning.  Symptom improved.  Positive for THC .  Complains of general body achiness. p      Consultants/Specialty  none    Code Status  full    Disposition  TBD    Review of Systems  Review of Systems   Constitutional: Positive for malaise/fatigue. Negative for chills, fever and weight loss.   HENT: Negative for congestion, ear discharge, ear pain, hearing loss and nosebleeds.    Eyes: Negative for blurred vision and pain.   Respiratory: Negative for cough, sputum production, shortness of breath and wheezing.    Cardiovascular: Negative for chest pain, palpitations, leg swelling and PND.   Gastrointestinal: Positive for abdominal pain and vomiting. Negative for constipation, diarrhea, heartburn and nausea.   Genitourinary: Negative for dysuria, frequency and hematuria.   Musculoskeletal: Negative for back pain, falls, joint pain and neck pain.   Skin: Negative for rash.   Neurological: Positive for weakness. Negative for dizziness, tremors, speech change, seizures and headaches.   Psychiatric/Behavioral: Negative for depression, substance abuse and suicidal ideas.        Physical Exam  Temp:  [36.4 °C (97.5 °F)-37.1 °C (98.8 °F)] 37 °C (98.6 °F)  Pulse:  [] 61  Resp:  [17-24] 18  BP: (124-139)/(72-97) 124/72  SpO2:  [92 %-97 %] 97 %    Physical Exam  Constitutional:       General: He is not in acute distress.     Appearance: Normal appearance. He is not ill-appearing, toxic-appearing or diaphoretic.   HENT:      Head: Normocephalic and atraumatic.      Right Ear: Tympanic membrane and  external ear normal.      Left Ear: Tympanic membrane and external ear normal.      Nose: No congestion or rhinorrhea.      Mouth/Throat:      Mouth: Mucous membranes are moist.      Pharynx: Oropharynx is clear. No oropharyngeal exudate.   Eyes:      Extraocular Movements: Extraocular movements intact.      Conjunctiva/sclera: Conjunctivae normal.      Pupils: Pupils are equal, round, and reactive to light.   Neck:      Musculoskeletal: Normal range of motion and neck supple. No neck rigidity or muscular tenderness.   Cardiovascular:      Rate and Rhythm: Regular rhythm. Tachycardia present.      Pulses: Normal pulses.      Heart sounds: Normal heart sounds. No murmur. No friction rub. No gallop.    Pulmonary:      Effort: Pulmonary effort is normal. No respiratory distress.      Breath sounds: Normal breath sounds. No stridor. No wheezing, rhonchi or rales.   Chest:      Chest wall: No tenderness.   Abdominal:      General: Abdomen is flat. Bowel sounds are normal. There is no distension.      Palpations: Abdomen is soft. There is no mass.      Tenderness: There is no tenderness. There is no guarding or rebound.      Hernia: No hernia is present.   Musculoskeletal: Normal range of motion.         General: No swelling or deformity.   Skin:     General: Skin is warm and dry.      Capillary Refill: Capillary refill takes more than 3 seconds.   Neurological:      General: No focal deficit present.      Mental Status: He is alert and oriented to person, place, and time. Mental status is at baseline.      Cranial Nerves: No cranial nerve deficit.      Motor: No weakness.   Psychiatric:         Mood and Affect: Mood normal.         Behavior: Behavior normal.         Fluids    Intake/Output Summary (Last 24 hours) at 1/8/2020 1549  Last data filed at 1/8/2020 1100  Gross per 24 hour   Intake 320 ml   Output 1850 ml   Net -1530 ml       Laboratory  Recent Labs     01/07/20  1302 01/08/20  0436   WBC 20.2* 11.8*   RBC 6.46*  5.06   HEMOGLOBIN 18.8* 15.1   HEMATOCRIT 52.2* 42.9   MCV 80.8* 84.8   MCH 29.1 29.8   MCHC 36.0* 35.2   RDW 35.8* 38.3   PLATELETCT 174 238   MPV 10.7 10.2     Recent Labs     01/07/20  1302 01/08/20  0231   SODIUM 133* 136   POTASSIUM 3.7 3.5*   CHLORIDE 87* 96   CO2 20 22   GLUCOSE 143* 99   BUN 55* 39*   CREATININE 2.75* 1.25   CALCIUM 11.0* 9.0     Recent Labs     01/07/20  1302   INR 0.89               Imaging  CT-ABDOMEN-PELVIS W/O   Final Result         1.  The gallbladder is surgically absent without biliary dilatation.   2.  There is no acute inflammatory process in the abdomen or pelvis.   3.  There is a small hiatal hernia.           Assessment/Plan  * Tetrahydrocannabinol (THC) use disorder, severe, dependence (HCC)  Assessment & Plan  Positive THC  Recurrent symptoms  Education provided  Supportive care  Hemodynamically stable    Hyponatremia  Assessment & Plan  hypovolemic- IV fluids  Improving, sodium 136    Metabolic acidosis  Assessment & Plan  IV fluids trial. Trend labs.   Resolved after IV fluid  Bicarb 22    Severe sepsis ([facility])- (present on admission)  Assessment & Plan  Patient symptoms significantly improved.  Likely not from infection source.  Possibly due to THC an d dehydration  Discontinue IV antibiotics      Dehydration  Assessment & Plan  Severe - IV fluids and trend labs. Control vomiting.   Improving.    ARF (acute renal failure) (CMS-[facility])  Assessment & Plan  Severe. Pre-renal. IV fluids and control n/v/d    Tobacco dependence and abuse:    - Smoking cessation education provided  - Nicotine patch  I spent 11 minutes on tobacco cessation counseling including nicotine patches, gum, and dangers of smoking. Also discussed options of nicotine patch, medical treatment with wellbutrin and chantix. Discussed the risks of smoking including increased risk of heart disease, stroke, cancer and COPD. Discussed the benefits of quitting smoking. Nicotine replacement protocol provided to the  patient.      13058 (smoking and tobacco cessation counseling visit; intensive, greater than 10 minutes).         VTE prophylaxis: SCD Lovenox      Current Facility-Administered Medications:   •  senna-docusate (PERICOLACE or SENOKOT S) 8.6-50 MG per tablet 2 Tab, 2 Tab, Oral, BID, Stopped at 01/07/20 1800 **AND** polyethylene glycol/lytes (MIRALAX) PACKET 1 Packet, 1 Packet, Oral, QDAY PRN **AND** magnesium hydroxide (MILK OF MAGNESIA) suspension 30 mL, 30 mL, Oral, QDAY PRN **AND** bisacodyl (DULCOLAX) suppository 10 mg, 10 mg, Rectal, QDAY PRN, Ervin Gilbert M.D.  •  lactated ringers infusion, , Intravenous, Continuous, Ervin Gilbert M.D., Last Rate: 125 mL/hr at 01/08/20 1318  •  enoxaparin (LOVENOX) inj 40 mg, 40 mg, Subcutaneous, DAILY, Ervin Gilbert M.D., 40 mg at 01/08/20 0549  •  acetaminophen (TYLENOL) tablet 650 mg, 650 mg, Oral, Q6HRS PRN, Ervin Gilbert M.D., 650 mg at 01/08/20 0642  •  enalaprilat (VASOTEC) injection 1.25 mg, 1.25 mg, Intravenous, Q6HRS PRN, Ervin Gilbert M.D.  •  ondansetron (ZOFRAN) syringe/vial injection 4 mg, 4 mg, Intravenous, Q4HRS PRN, Ervin Gilbert M.D., 4 mg at 01/08/20 0642  •  ondansetron (ZOFRAN ODT) dispertab 4 mg, 4 mg, Oral, Q4HRS PRN, Ervni Gilbert M.D.  •  promethazine (PHENERGAN) tablet 12.5-25 mg, 12.5-25 mg, Oral, Q4HRS PRN, Ervin Gilbert M.D.  •  promethazine (PHENERGAN) suppository 12.5-25 mg, 12.5-25 mg, Rectal, Q4HRS PRN, Ervin Gilbert M.D.  •  prochlorperazine (COMPAZINE) injection 5-10 mg, 5-10 mg, Intravenous, Q4HRS PRN, Ervin Gilbert M.D., 10 mg at 01/07/20 1641  •  diphenoxylate-atropine (LOMOTIL) 2.5-0.025 MG per tablet 1 Tab, 1 Tab, Oral, 4X/DAY, Ervin Gilbert M.D., 1 Tab at 01/08/20 1318  •  omeprazole (PRILOSEC) capsule 20 mg, 20 mg, Oral, BID, Ervin Gilbert M.D., 20 mg at 01/08/20 0565  •  HYDROmorphone pf (DILAUDID) injection 0.5 mg, 0.5 mg, Intravenous, Q4HRS PRN, Ervin Gilbert M.D., 0.5 mg at 01/07/20  [id number]

## 2020-01-08 NOTE — PROGRESS NOTES
2 RN skin assessment completed. Noted bruises on the right buttocks and appears it is healing. Otherwise rest of skin is intact.

## 2020-01-08 NOTE — PROGRESS NOTES
Received report from ER RN. Pt arrived to unit via gurney. Pt ambulated to bed using a FWW, gait is steady. Pt grunting and moaning in pain. Noted no prn pain meds ordered, will page Hospitalist. Wife is at bedside and supportive. Pt states he is unable to eat or keep anything down since Sunday. Pt is on clear liquids. IV fluids infusing. Call light within reach. Will continue to monitor.

## 2020-01-08 NOTE — CARE PLAN
Problem: Safety  Goal: Will remain free from injury  Outcome: PROGRESSING AS EXPECTED    Wife is at bedside. Pt uses call light for assistance. Hourly rounds in place.      Problem: Infection  Goal: Will remain free from infection  Outcome: PROGRESSING AS EXPECTED    Pt is on IV abt for gastroenteritis, no adverse reaction noted. Labs reviewed.      Problem: Pain Management  Goal: Pain level will decrease to patient's comfort goal  Outcome: PROGRESSING AS EXPECTED     Administered prn IV dilaudid 0.5 ml for abdominal pain.

## 2020-01-08 NOTE — PROGRESS NOTES
Dr. Gilbert called back. Per MD, prn IV dilaudid is for severe pain only. MD wants this RN to administer prn tylenol first and if pt continues to have abdominal pain, then can administer IV dilaudid.

## 2020-01-09 LAB
ANION GAP SERPL CALC-SCNC: 11 MMOL/L (ref 0–11.9)
BASOPHILS # BLD AUTO: 0.6 % (ref 0–1.8)
BASOPHILS # BLD: 0.04 K/UL (ref 0–0.12)
BUN SERPL-MCNC: 16 MG/DL (ref 8–22)
CALCIUM SERPL-MCNC: 9 MG/DL (ref 8.4–10.2)
CHLORIDE SERPL-SCNC: 104 MMOL/L (ref 96–112)
CO2 SERPL-SCNC: 24 MMOL/L (ref 20–33)
CREAT SERPL-MCNC: 0.75 MG/DL (ref 0.5–1.4)
EOSINOPHIL # BLD AUTO: 0.09 K/UL (ref 0–0.51)
EOSINOPHIL NFR BLD: 1.2 % (ref 0–6.9)
ERYTHROCYTE [DISTWIDTH] IN BLOOD BY AUTOMATED COUNT: 39.3 FL (ref 35.9–50)
GLUCOSE SERPL-MCNC: 89 MG/DL (ref 65–99)
HCT VFR BLD AUTO: 38 % (ref 42–52)
HGB BLD-MCNC: 13 G/DL (ref 14–18)
IMM GRANULOCYTES # BLD AUTO: 0.02 K/UL (ref 0–0.11)
IMM GRANULOCYTES NFR BLD AUTO: 0.3 % (ref 0–0.9)
LYMPHOCYTES # BLD AUTO: 3.46 K/UL (ref 1–4.8)
LYMPHOCYTES NFR BLD: 47.7 % (ref 22–41)
MCH RBC QN AUTO: 29.4 PG (ref 27–33)
MCHC RBC AUTO-ENTMCNC: 34.2 G/DL (ref 33.7–35.3)
MCV RBC AUTO: 86 FL (ref 81.4–97.8)
MONOCYTES # BLD AUTO: 0.84 K/UL (ref 0–0.85)
MONOCYTES NFR BLD AUTO: 11.6 % (ref 0–13.4)
NEUTROPHILS # BLD AUTO: 2.8 K/UL (ref 1.82–7.42)
NEUTROPHILS NFR BLD: 38.6 % (ref 44–72)
NRBC # BLD AUTO: 0 K/UL
NRBC BLD-RTO: 0 /100 WBC
PLATELET # BLD AUTO: 53 K/UL (ref 164–446)
PMV BLD AUTO: 9.7 FL (ref 9–12.9)
POTASSIUM SERPL-SCNC: 4.1 MMOL/L (ref 3.6–5.5)
RBC # BLD AUTO: 4.42 M/UL (ref 4.7–6.1)
SODIUM SERPL-SCNC: 139 MMOL/L (ref 135–145)
WBC # BLD AUTO: 7.3 K/UL (ref 4.8–10.8)

## 2020-01-09 PROCEDURE — 85025 COMPLETE CBC W/AUTO DIFF WBC: CPT

## 2020-01-09 PROCEDURE — 80048 BASIC METABOLIC PNL TOTAL CA: CPT

## 2020-01-09 PROCEDURE — 700105 HCHG RX REV CODE 258: Performed by: HOSPITALIST

## 2020-01-09 PROCEDURE — 770006 HCHG ROOM/CARE - MED/SURG/GYN SEMI*

## 2020-01-09 PROCEDURE — 700111 HCHG RX REV CODE 636 W/ 250 OVERRIDE (IP): Performed by: HOSPITALIST

## 2020-01-09 PROCEDURE — 36415 COLL VENOUS BLD VENIPUNCTURE: CPT

## 2020-01-09 PROCEDURE — 700102 HCHG RX REV CODE 250 W/ 637 OVERRIDE(OP): Performed by: HOSPITALIST

## 2020-01-09 PROCEDURE — 99233 SBSQ HOSP IP/OBS HIGH 50: CPT | Performed by: INTERNAL MEDICINE

## 2020-01-09 PROCEDURE — A9270 NON-COVERED ITEM OR SERVICE: HCPCS | Performed by: HOSPITALIST

## 2020-01-09 RX ADMIN — SODIUM CHLORIDE, POTASSIUM CHLORIDE, SODIUM LACTATE AND CALCIUM CHLORIDE: 600; 310; 30; 20 INJECTION, SOLUTION INTRAVENOUS at 22:30

## 2020-01-09 RX ADMIN — HYDROMORPHONE HYDROCHLORIDE 0.5 MG: 1 INJECTION, SOLUTION INTRAMUSCULAR; INTRAVENOUS; SUBCUTANEOUS at 13:05

## 2020-01-09 RX ADMIN — DIPHENOXYLATE HYDROCHLORIDE AND ATROPINE SULFATE 1 TABLET: 2.5; .025 TABLET ORAL at 18:58

## 2020-01-09 RX ADMIN — OMEPRAZOLE 20 MG: 20 CAPSULE, DELAYED RELEASE ORAL at 18:58

## 2020-01-09 RX ADMIN — DIPHENOXYLATE HYDROCHLORIDE AND ATROPINE SULFATE 1 TABLET: 2.5; .025 TABLET ORAL at 22:15

## 2020-01-09 RX ADMIN — PROCHLORPERAZINE EDISYLATE 10 MG: 5 INJECTION INTRAMUSCULAR; INTRAVENOUS at 18:06

## 2020-01-09 RX ADMIN — SODIUM CHLORIDE, POTASSIUM CHLORIDE, SODIUM LACTATE AND CALCIUM CHLORIDE: 600; 310; 30; 20 INJECTION, SOLUTION INTRAVENOUS at 06:29

## 2020-01-09 RX ADMIN — PROCHLORPERAZINE EDISYLATE 10 MG: 5 INJECTION INTRAMUSCULAR; INTRAVENOUS at 11:02

## 2020-01-09 RX ADMIN — ENOXAPARIN SODIUM 40 MG: 100 INJECTION SUBCUTANEOUS at 05:11

## 2020-01-09 RX ADMIN — ONDANSETRON HYDROCHLORIDE 4 MG: 2 SOLUTION INTRAMUSCULAR; INTRAVENOUS at 08:47

## 2020-01-09 RX ADMIN — HYDROMORPHONE HYDROCHLORIDE 0.5 MG: 1 INJECTION, SOLUTION INTRAMUSCULAR; INTRAVENOUS; SUBCUTANEOUS at 09:00

## 2020-01-09 RX ADMIN — HYDROMORPHONE HYDROCHLORIDE 0.5 MG: 1 INJECTION, SOLUTION INTRAMUSCULAR; INTRAVENOUS; SUBCUTANEOUS at 18:06

## 2020-01-09 RX ADMIN — OMEPRAZOLE 20 MG: 20 CAPSULE, DELAYED RELEASE ORAL at 05:10

## 2020-01-09 ASSESSMENT — ENCOUNTER SYMPTOMS
COUGH: 0
PND: 0
FALLS: 0
CONSTIPATION: 0
WEIGHT LOSS: 0
HEADACHES: 0
WHEEZING: 0
VOMITING: 1
WEAKNESS: 1
PALPITATIONS: 0
EYE PAIN: 0
SHORTNESS OF BREATH: 0
DOUBLE VISION: 0
FEVER: 0
BLURRED VISION: 0
DIZZINESS: 0
ABDOMINAL PAIN: 1
SPEECH CHANGE: 0
NAUSEA: 1
DEPRESSION: 0
SEIZURES: 0
TREMORS: 0
SPUTUM PRODUCTION: 0
NECK PAIN: 0
HEARTBURN: 1

## 2020-01-09 ASSESSMENT — LIFESTYLE VARIABLES: SUBSTANCE_ABUSE: 0

## 2020-01-09 NOTE — PROGRESS NOTES
[facility] Medicine Daily Progress Note    Date of Service  1/9/2020    Chief Complaint  44 y.o. male admitted 1/7/2020 with nausea vomiting    Hospital Course    Past medical history of recurrent nausea vomiting presented with a similar problem.  Patient has previous history of THC positive.      Interval Problem Update  1/8.  Patient still complains of nausea vomiting.  But better.  Able to tolerate clear liquid diet this morning.  Symptom improved.  Positive for THC .  Complains of general body achiness.   1/9.  Patient still complains of nausea vomiting abdominal pain and heartburn.  Patient GI symptoms returned since last night. Patient's pain is local, 5-6/10, intermittent and does not radiate to other location, sharp and with some tingling. Can be controlled by pain meds.  Likely from marijuana use.  Supportive care at this moment.  Patient's leukocytosis resolved    Consultants/Specialty  none    Code Status  full    Disposition  TBD    Review of Systems  Review of Systems   Constitutional: Positive for malaise/fatigue. Negative for fever and weight loss.   HENT: Negative for congestion, ear discharge, ear pain, hearing loss and nosebleeds.    Eyes: Negative for blurred vision, double vision and pain.   Respiratory: Negative for cough, sputum production, shortness of breath and wheezing.    Cardiovascular: Negative for chest pain, palpitations, leg swelling and PND.   Gastrointestinal: Positive for abdominal pain, heartburn, nausea and vomiting. Negative for constipation.   Genitourinary: Negative for dysuria, frequency and hematuria.   Musculoskeletal: Negative for falls, joint pain and neck pain.   Skin: Negative for rash.   Neurological: Positive for weakness. Negative for dizziness, tremors, speech change, seizures and headaches.   Psychiatric/Behavioral: Negative for depression, substance abuse and suicidal ideas.        Physical Exam  Temp:  [36.3 °C (97.4 °F)-37 °C (98.6 °F)] 36.5 °C (97.7 °F)  Pulse:   [68-77] 77  Resp:  [18] 18  BP: (120-165)/(64-92) 165/92  SpO2:  [92 %-99 %] 99 %    Physical Exam  Constitutional:       General: He is not in acute distress.     Appearance: Normal appearance. He is ill-appearing. He is not toxic-appearing or diaphoretic.   HENT:      Head: Normocephalic and atraumatic.      Right Ear: Ear canal and external ear normal.      Left Ear: Ear canal and external ear normal.      Nose: No congestion or rhinorrhea.      Mouth/Throat:      Mouth: Mucous membranes are moist.      Pharynx: Oropharynx is clear. No oropharyngeal exudate.   Eyes:      Extraocular Movements: Extraocular movements intact.      Conjunctiva/sclera: Conjunctivae normal.      Pupils: Pupils are equal, round, and reactive to light.   Neck:      Musculoskeletal: Normal range of motion and neck supple. No neck rigidity or muscular tenderness.   Cardiovascular:      Rate and Rhythm: Regular rhythm. Tachycardia present.      Pulses: Normal pulses.      Heart sounds: Normal heart sounds. No murmur. No gallop.    Pulmonary:      Effort: Pulmonary effort is normal. No respiratory distress.      Breath sounds: Normal breath sounds. No stridor. No wheezing or rales.   Chest:      Chest wall: No tenderness.   Abdominal:      General: Abdomen is flat. Bowel sounds are normal. There is no distension.      Palpations: Abdomen is soft. There is no mass.      Tenderness: There is tenderness. There is no guarding or rebound.      Hernia: No hernia is present.   Musculoskeletal: Normal range of motion.         General: No swelling or deformity.   Skin:     General: Skin is warm and dry.      Capillary Refill: Capillary refill takes more than 3 seconds.   Neurological:      General: No focal deficit present.      Mental Status: He is alert and oriented to person, place, and time. Mental status is at baseline.      Cranial Nerves: No cranial nerve deficit.      Motor: No weakness.   Psychiatric:         Mood and Affect: Mood normal.          Behavior: Behavior normal.         Fluids    Intake/Output Summary (Last 24 hours) at 1/9/2020 1356  Last data filed at 1/9/2020 1100  Gross per 24 hour   Intake 2568 ml   Output 2600 ml   Net -32 ml       Laboratory  Recent Labs     01/07/20  1302 01/08/20  0436 01/09/20  0440   WBC 20.2* 11.8* 7.3   RBC 6.46* 5.06 4.42*   HEMOGLOBIN 18.8* 15.1 13.0*   HEMATOCRIT 52.2* 42.9 38.0*   MCV 80.8* 84.8 86.0   MCH 29.1 29.8 29.4   MCHC 36.0* 35.2 34.2   RDW 35.8* 38.3 39.3   PLATELETCT 174 238 53*   MPV 10.7 10.2 9.7     Recent Labs     01/07/20  1302 01/08/20  0231 01/09/20  0440   SODIUM 133* 136 139   POTASSIUM 3.7 3.5* 4.1   CHLORIDE 87* 96 104   CO2 20 22 24   GLUCOSE 143* 99 89   BUN 55* 39* 16   CREATININE 2.75* 1.25 0.75   CALCIUM 11.0* 9.0 9.0     Recent Labs     01/07/20  1302   INR 0.89               Imaging  CT-ABDOMEN-PELVIS W/O   Final Result         1.  The gallbladder is surgically absent without biliary dilatation.   2.  There is no acute inflammatory process in the abdomen or pelvis.   3.  There is a small hiatal hernia.           Assessment/Plan  * Tetrahydrocannabinol (THC) use disorder, severe, dependence (HCC)  Assessment & Plan  Positive THC  Recurrent symptoms  Education provided  Supportive care  Hemodynamically stable  Still have symptoms.  Leukocytosis resolved.  Continue supportive care at this moment.    Hyponatremia  Assessment & Plan  hypovolemic- IV fluids  Improving, sodium 136    Metabolic acidosis  Assessment & Plan  IV fluids trial. Trend labs.   Resolved after IV fluid  Bicarb 22  Normal kidney function continue IV fluid.    Severe sepsis (HCC)- (present on admission)  Assessment & Plan  Patient symptoms significantly improved.  Likely not from infection source.  Possibly due to THC an d dehydration  Discontinue IV antibiotics      Dehydration  Assessment & Plan  Severe - IV fluids and trend labs. Control vomiting.   Improving.    ARF (acute renal failure) (CMS-[facility])  Assessment &  Plan  Severe. Pre-renal. IV fluids and control n/v/d    Tobacco dependence and abuse:    - Smoking cessation education provided  - Nicotine patch           VTE prophylaxis: SCD Lovenox      Current Facility-Administered Medications:   •  diphenhydrAMINE (BENADRYL) tablet/capsule 25 mg, 25 mg, Oral, HS PRN, Blas Burkett D.O., 25 mg at 01/08/20 2240  •  senna-docusate (PERICOLACE or SENOKOT S) 8.6-50 MG per tablet 2 Tab, 2 Tab, Oral, BID, Stopped at 01/07/20 1800 **AND** polyethylene glycol/lytes (MIRALAX) PACKET 1 Packet, 1 Packet, Oral, QDAY PRN **AND** magnesium hydroxide (MILK OF MAGNESIA) suspension 30 mL, 30 mL, Oral, QDAY PRN **AND** bisacodyl (DULCOLAX) suppository 10 mg, 10 mg, Rectal, QDAY PRN, Ervin Gilbert M.D.  •  lactated ringers infusion, , Intravenous, Continuous, Ervin Gilbert M.D., Last Rate: 125 mL/hr at 01/09/20 0629  •  enoxaparin (LOVENOX) inj 40 mg, 40 mg, Subcutaneous, DAILY, Ervin Gilbert M.D., 40 mg at 01/09/20 0511  •  acetaminophen (TYLENOL) tablet 650 mg, 650 mg, Oral, Q6HRS PRN, Ervin Gilbert M.D., 650 mg at 01/08/20 0642  •  enalaprilat (VASOTEC) injection 1.25 mg, 1.25 mg, Intravenous, Q6HRS PRN, Ervin Gilbert M.D.  •  ondansetron (ZOFRAN) syringe/vial injection 4 mg, 4 mg, Intravenous, Q4HRS PRN, Ervin Gilbert M.D., 4 mg at 01/09/20 0847  •  ondansetron (ZOFRAN ODT) dispertab 4 mg, 4 mg, Oral, Q4HRS PRN, Ervin Gilbert M.D.  •  promethazine (PHENERGAN) tablet 12.5-25 mg, 12.5-25 mg, Oral, Q4HRS PRN, Ervin Gilbert M.D.  •  promethazine (PHENERGAN) suppository 12.5-25 mg, 12.5-25 mg, Rectal, Q4HRS PRN, Ervin Gilbert M.D.  •  prochlorperazine (COMPAZINE) injection 5-10 mg, 5-10 mg, Intravenous, Q4HRS PRN, Ervin Gilbert M.D., 10 mg at 01/09/20 1102  •  diphenoxylate-atropine (LOMOTIL) 2.5-0.025 MG per tablet 1 Tab, 1 Tab, Oral, 4X/DAY, Ervin Gilbert M.D., 1 Tab at 01/08/20 2123  •  omeprazole (PRILOSEC) capsule 20 mg, 20 mg, Oral, BID, Ervin Gilbert,  M.D., 20 mg at 01/09/20 0514  •  HYDROmorphone pf (DILAUDID) injection 0.5 mg, 0.5 mg, Intravenous, Q4HRS PRN, Ervin Gilbert M.D., 0.5 mg at 01/09/20 6479

## 2020-01-09 NOTE — PROGRESS NOTES
" Pt AAOx4. Denies any pain, but reports generalized body soreness. Denies any nausea, vomiting, but reports \"a little SOB\" expiratory wheezes heard during assessment.  Due meds given. POC discussed w/ pt. Pt requested a sleep aid tonight. Will attempt to call MD. Safety and comfort measures in place. No additional needs at this time.  "

## 2020-01-09 NOTE — CARE PLAN
Problem: Bowel/Gastric:  Goal: Normal bowel function is maintained or improved  Outcome: PROGRESSING AS EXPECTED   Pt denies any nausea and vomiting tonight and reports nausea has been better all day. Reports had loose BM during the day, but feeling better overall. Pt taking lomotil for diarrhea.      Problem: Communication  Goal: The ability to communicate needs accurately and effectively will improve  Outcome: PROGRESSING AS EXPECTED   Pt able to communicate needs appropriately to staff. Plan of care discussed to pt. Questions and concerns answered. Pt. Verbalized understanding. Communication board updated.

## 2020-01-09 NOTE — PROGRESS NOTES
0700: Bedside report from Jovani FARRAR RN. Pt wakes to voice. No needs at this time. Pending possible DC.    0850: Pt with n/v, medicated per MAR.     0905: Pt c/o pain with n/v given dilaudid.     0930: Pt states continued nausea but no vomiting. States pain medication seems to be working.

## 2020-01-10 VITALS
TEMPERATURE: 99 F | SYSTOLIC BLOOD PRESSURE: 123 MMHG | DIASTOLIC BLOOD PRESSURE: 75 MMHG | OXYGEN SATURATION: 95 % | BODY MASS INDEX: 28.03 KG/M2 | WEIGHT: 178.57 LBS | RESPIRATION RATE: 18 BRPM | HEIGHT: 67 IN | HEART RATE: 82 BPM

## 2020-01-10 PROBLEM — E86.0 DEHYDRATION: Status: RESOLVED | Noted: 2018-10-12 | Resolved: 2020-01-10

## 2020-01-10 PROBLEM — E87.20 METABOLIC ACIDOSIS: Status: RESOLVED | Noted: 2020-01-07 | Resolved: 2020-01-10

## 2020-01-10 PROBLEM — E87.1 HYPONATREMIA: Status: RESOLVED | Noted: 2020-01-07 | Resolved: 2020-01-10

## 2020-01-10 PROCEDURE — 90471 IMMUNIZATION ADMIN: CPT

## 2020-01-10 PROCEDURE — 99239 HOSP IP/OBS DSCHRG MGMT >30: CPT | Mod: 25 | Performed by: INTERNAL MEDICINE

## 2020-01-10 PROCEDURE — 700111 HCHG RX REV CODE 636 W/ 250 OVERRIDE (IP): Performed by: INTERNAL MEDICINE

## 2020-01-10 PROCEDURE — A9270 NON-COVERED ITEM OR SERVICE: HCPCS | Performed by: HOSPITALIST

## 2020-01-10 PROCEDURE — 99406 BEHAV CHNG SMOKING 3-10 MIN: CPT | Performed by: INTERNAL MEDICINE

## 2020-01-10 PROCEDURE — 90686 IIV4 VACC NO PRSV 0.5 ML IM: CPT | Performed by: INTERNAL MEDICINE

## 2020-01-10 PROCEDURE — 700102 HCHG RX REV CODE 250 W/ 637 OVERRIDE(OP): Performed by: HOSPITALIST

## 2020-01-10 PROCEDURE — 700111 HCHG RX REV CODE 636 W/ 250 OVERRIDE (IP): Performed by: HOSPITALIST

## 2020-01-10 RX ORDER — ONDANSETRON 4 MG/1
4 TABLET, ORALLY DISINTEGRATING ORAL EVERY 4 HOURS PRN
Qty: 10 TAB | Refills: 0 | Status: SHIPPED | OUTPATIENT
Start: 2020-01-10 | End: 2022-02-23 | Stop reason: SDUPTHER

## 2020-01-10 RX ADMIN — ENOXAPARIN SODIUM 40 MG: 100 INJECTION SUBCUTANEOUS at 05:07

## 2020-01-10 RX ADMIN — INFLUENZA A VIRUS A/BRISBANE/02/2018 IVR-190 (H1N1) ANTIGEN (FORMALDEHYDE INACTIVATED), INFLUENZA A VIRUS A/KANSAS/14/2017 X-327 (H3N2) ANTIGEN (FORMALDEHYDE INACTIVATED), INFLUENZA B VIRUS B/PHUKET/3073/2013 ANTIGEN (FORMALDEHYDE INACTIVATED), AND INFLUENZA B VIRUS B/MARYLAND/15/2016 BX-69A ANTIGEN (FORMALDEHYDE INACTIVATED) 0.5 ML: 15; 15; 15; 15 INJECTION, SUSPENSION INTRAMUSCULAR at 10:58

## 2020-01-10 RX ADMIN — OMEPRAZOLE 20 MG: 20 CAPSULE, DELAYED RELEASE ORAL at 05:07

## 2020-01-10 NOTE — DISCHARGE INSTRUCTIONS
Discharge Instructions    Discharged to home by car with relative. Discharged via walking, hospital escort: Refused.  Special equipment needed: Not Applicable    Be sure to schedule a follow-up appointment with your primary care doctor or any specialists as instructed.     Discharge Plan:   Diet Plan: Discussed  Activity Level: Discussed  Smoking Cessation Offered: Patient Counseled  Confirmed Follow up Appointment: Patient to Call and Schedule Appointment  Confirmed Symptoms Management: Discussed  Medication Reconciliation Updated: Yes  Influenza Vaccine Indication: Indicated: 9 to 64 years of age    I understand that a diet low in cholesterol, fat, and sodium is recommended for good health. Unless I have been given specific instructions below for another diet, I accept this instruction as my diet prescription.   Other diet: Regular    Special Instructions: None    · Is patient discharged on Warfarin / Coumadin?   No     Depression / Suicide Risk    As you are discharged from this [facility] facility, it is important to learn how to keep safe from harming yourself.    Recognize the warning signs:  · Abrupt changes in personality, positive or negative- including increase in energy   · Giving away possessions  · Change in eating patterns- significant weight changes-  positive or negative  · Change in sleeping patterns- unable to sleep or sleeping all the time   · Unwillingness or inability to communicate  · Depression  · Unusual sadness, discouragement and loneliness  · Talk of wanting to die  · Neglect of personal appearance   · Rebelliousness- reckless behavior  · Withdrawal from people/activities they love  · Confusion- inability to concentrate     If you or a loved one observes any of these behaviors or has concerns about self-harm, here's what you can do:  · Talk about it- your feelings and reasons for harming yourself  · Remove any means that you might use to hurt yourself (examples: pills, rope, extension  cords, firearm)  · Get professional help from the community (Mental Health, Substance Abuse, psychological counseling)  · Do not be alone:Call your Safe Contact- someone whom you trust who will be there for you.  · Call your local CRISIS HOTLINE 317-3866 or 833-728-9230  · Call your local Children's Mobile Crisis Response Team [place] (951) 301-5520 or www.[employer]  · Call the toll free National Suicide Prevention Hotlines   · National Suicide Prevention Lifeline 248-824-VEFR (7253)  · National Hope Line Network 800-SUICIDE (695-5018)

## 2020-01-10 NOTE — CARE PLAN
Problem: Infection  Goal: Will remain free from infection  Outcome: PROGRESSING AS EXPECTED  Intervention: Implement standard precautions and perform hand washing before and after patient contact  Note:   Standard precautions implemented.      Problem: Bowel/Gastric:  Goal: Normal bowel function is maintained or improved  Outcome: PROGRESSING AS EXPECTED

## 2020-01-10 NOTE — DISCHARGE SUMMARY
Discharge Summary    CHIEF COMPLAINT ON ADMISSION  Chief Complaint   Patient presents with   • Abdominal Pain     pt c/o RUQ pain x 1 day that intermittantly travels throughout abdomen   • Nausea/Vomiting/Diarrhea     Pt c/o n/v/d since yesterday. Pt states he is unable to keep anything down. pt also c/o bruising to buttocks       Reason for Admission  Abdominal Pain, Vomiting     Admission Date  1/7/2020    CODE STATUS  Full Code    HPI & HOSPITAL COURSE  This is a 44 y.o. male here with  Past medical history of recurrent nausea vomiting presented with a similar problem.  Patient has previous history of THC positive.  Patient symptoms certainly because of THC abuse.  Education provided and patient understood.  Patient was treated with IV fluid rehydration and supportive care.  Patient leukocytosis significantly improved.  Initially patient was thought to have sepsis but this has been ruled out because patient symptoms is due to nausea vomiting not from infection.  Patient currently remained stable and ready be discharged home.    Therefore, he is discharged in fair and stable condition to home with close outpatient follow-up.    The patient met 2-midnight criteria for an inpatient stay at the time of discharge.    Discharge Date  1/10/2020    FOLLOW UP ITEMS POST DISCHARGE  none    DISCHARGE DIAGNOSES  Principal Problem:    Tetrahydrocannabinol (THC) use disorder, severe, dependence (HCC) POA: Yes  Active Problems:    Tobacco abuse POA: Yes  Resolved Problems:    ARF (acute renal failure) (CMS-HCC) POA: Yes    Dehydration POA: Yes    Metabolic acidosis POA: Yes    Hyponatremia POA: Yes      FOLLOW UP  Future Appointments   Date Time Provider Department Center   1/14/2020  3:40 PM Andrea Machado, P.A.-C. [facility] [place]     Andrea Machado, PMYKE.-C.  [address]  [address]  [facility] [address]  [phone]            MEDICATIONS ON DISCHARGE     Medication List      START taking these medications       Instructions   ondansetron 4 MG Tbdp  Commonly known as:  ZOFRAN ODT   Take 1 Tab by mouth every four hours as needed for Nausea (give PO if no IV route available).  Dose:  4 mg        CONTINUE taking these medications      Instructions   pantoprazole 40 MG Tbec  Commonly known as:  PROTONIX   Take 40 mg by mouth 2 times a day.  Dose:  40 mg            Allergies  Allergies   Allergen Reactions   • Fish Anaphylaxis     Sycamore and tuna   • Hops Oil Anaphylaxis   • Barley Grass Anaphylaxis   • Green Beans Shortness of Breath and Nausea   • Nicoderm [Nicotine] Rash, Itching and Swelling     Blisters noted to patch site   • Turkey Shortness of Breath and Nausea       DIET  Orders Placed This Encounter   Procedures   • Diet Order Low Fiber(GI Soft)     Standing Status:   Standing     Number of Occurrences:   1     Order Specific Question:   Diet:     Answer:   Low Fiber(GI Soft) [2]   • Discontinue Diet Tray     Standing Status:   Standing     Number of Occurrences:   1       ACTIVITY  As tolerated.  Weight bearing as tolerated    CONSULTATIONS  none    PROCEDURES  None    CT-ABDOMEN-PELVIS W/O   Final Result         1.  The gallbladder is surgically absent without biliary dilatation.   2.  There is no acute inflammatory process in the abdomen or pelvis.   3.  There is a small hiatal hernia.          PE:  Gen: AAOx3, NAD  Eyes: PELLA  Neck: no JVD, no lymphadenopathy  Cardia: RRR, no mrg  Lungs: CTAB, no rales, rhonci or wheezing  Abd: NABS, soft, non extended, no mass  EXT: No C/C/E, peripheral pulse 2+ b/l  Neuro: CN II-XII intact, non focal, reflex 2+ symmetrical  Skin: Intact, no lesion, warm  Psych: Appropriate.      LABORATORY  Lab Results   Component Value Date    SODIUM 139 01/09/2020    POTASSIUM 4.1 01/09/2020    CHLORIDE 104 01/09/2020    CO2 24 01/09/2020    GLUCOSE 89 01/09/2020    BUN 16 01/09/2020    CREATININE 0.75 01/09/2020        Lab Results   Component Value Date    WBC 7.3 01/09/2020    HEMOGLOBIN  13.0 (L) 01/09/2020    HEMATOCRIT 38.0 (L) 01/09/2020    PLATELETCT 53 (L) 01/09/2020        Total time of the discharge process exceeds 38 minutes excluding smoking consultation.    I spent 8 minutes on tobacco cessation counseling including nicotine patches, gum, and dangers of smoking. Discussed the benefits of quitting smoking. Nicotine replacement protocol provided to the patient.    The pt indicated that will quit.     58749 (smoking and tobacco cessation counseling visit; 3-10 min)

## 2020-01-10 NOTE — CARE PLAN
Problem: Bowel/Gastric:  Goal: Normal bowel function is maintained or improved  Outcome: PROGRESSING AS EXPECTED   Pt denies n/v tonight so far. Encouraged to inform Rn if symptoms persist.      Problem: Pain Management  Goal: Pain level will decrease to patient's comfort goal  Outcome: PROGRESSING AS EXPECTED   Pain is at comfortable level so far. Encouraged to inform RN if pain is greater than comfort level.

## 2020-01-10 NOTE — PROGRESS NOTES
Assumed pt care. Pt states nausea has been better and pain is at comfortable level. Denies dizziness, SOB, chills, vomiting. Lungs clear in all quadrants. IVF infusing. POC discussed w/ pt. Pt verbalized understanding. All needs met at this time.

## 2020-01-14 ENCOUNTER — OFFICE VISIT (OUTPATIENT)
Dept: MEDICAL GROUP | Facility: MEDICAL CENTER | Age: 45
End: 2020-01-14

## 2020-01-14 VITALS
HEIGHT: 68 IN | DIASTOLIC BLOOD PRESSURE: 70 MMHG | OXYGEN SATURATION: 95 % | SYSTOLIC BLOOD PRESSURE: 118 MMHG | BODY MASS INDEX: 27.28 KG/M2 | WEIGHT: 180 LBS | RESPIRATION RATE: 16 BRPM | TEMPERATURE: 98.6 F | HEART RATE: 92 BPM

## 2020-01-14 DIAGNOSIS — G47.33 OBSTRUCTIVE SLEEP APNEA SYNDROME: ICD-10-CM

## 2020-01-14 DIAGNOSIS — K21.00 GASTROESOPHAGEAL REFLUX DISEASE WITH ESOPHAGITIS: ICD-10-CM

## 2020-01-14 DIAGNOSIS — E78.5 DYSLIPIDEMIA: ICD-10-CM

## 2020-01-14 DIAGNOSIS — R11.15 CYCLICAL VOMITING SYNDROME: ICD-10-CM

## 2020-01-14 PROBLEM — R10.9 ABDOMINAL PAIN: Status: RESOLVED | Noted: 2019-03-21 | Resolved: 2020-01-14

## 2020-01-14 PROBLEM — K92.1 BLOOD IN STOOL: Status: RESOLVED | Noted: 2019-03-22 | Resolved: 2020-01-14

## 2020-01-14 PROBLEM — E66.9 OBESITY: Status: RESOLVED | Noted: 2018-11-28 | Resolved: 2020-01-14

## 2020-01-14 PROBLEM — K52.9 ENTERITIS: Status: RESOLVED | Noted: 2019-03-21 | Resolved: 2020-01-14

## 2020-01-14 PROBLEM — I10 HYPERTENSION: Status: RESOLVED | Noted: 2019-03-21 | Resolved: 2020-01-14

## 2020-01-14 PROBLEM — N17.9 AKI (ACUTE KIDNEY INJURY) (HCC): Status: RESOLVED | Noted: 2019-03-21 | Resolved: 2020-01-14

## 2020-01-14 PROBLEM — R65.10 SIRS (SYSTEMIC INFLAMMATORY RESPONSE SYNDROME) (HCC): Status: RESOLVED | Noted: 2019-03-21 | Resolved: 2020-01-14

## 2020-01-14 PROBLEM — R00.0 SINUS TACHYCARDIA: Status: RESOLVED | Noted: 2018-04-09 | Resolved: 2020-01-14

## 2020-01-14 PROBLEM — E87.6 HYPOKALEMIA: Status: RESOLVED | Noted: 2019-03-21 | Resolved: 2020-01-14

## 2020-01-14 PROBLEM — I27.20 PULMONARY HTN (HCC): Status: RESOLVED | Noted: 2018-10-08 | Resolved: 2020-01-14

## 2020-01-14 PROCEDURE — 99214 OFFICE O/P EST MOD 30 MIN: CPT | Performed by: PHYSICIAN ASSISTANT

## 2020-01-14 RX ORDER — PROMETHAZINE HYDROCHLORIDE 25 MG/1
25 TABLET ORAL EVERY 6 HOURS PRN
Qty: 60 TAB | Refills: 1 | Status: SHIPPED
Start: 2020-01-14 | End: 2020-01-20 | Stop reason: SDUPTHER

## 2020-01-14 RX ORDER — CIMETIDINE 800 MG
800 TABLET ORAL
Qty: 60 TAB | Refills: 5 | Status: SHIPPED
Start: 2020-01-14 | End: 2020-01-20 | Stop reason: SDUPTHER

## 2020-01-14 ASSESSMENT — PATIENT HEALTH QUESTIONNAIRE - PHQ9: CLINICAL INTERPRETATION OF PHQ2 SCORE: 0

## 2020-01-14 NOTE — ASSESSMENT & PLAN NOTE
Chronic condition.  Was taking 10 mg of simvastatin but stopped medication about 6 weeks ago.  No recent cholesterol profile.

## 2020-01-14 NOTE — PROGRESS NOTES
Subjective:   Jerome Cardoza Jr. is a 44 y.o. male here today for hospitalization secondary to diagnosis of cyclical vomiting syndrome given his history of reflux, dyslipidemia and sleep apnea.    Gastroesophageal reflux disease with esophagitis  This is a 44-year-old male here today to discuss his follow-up from his hospital stay for 3 days secondary to nauseousness and vomiting.  Diagnosed with cyclical vomiting syndrome secondary to THC use.  He disagrees with diagnosis.  States that he has been using marijuana for his nauseousness for many years.  Uses about 4 times a week.  Has seen GI in the past but currently does not have insurance.  Has a history of strictures and Randle's esophagus.  Is currently on a PPI as well as a H2 blocker which had to be discontinued because it was taken off the market.  It was ranitidine.  Currently on Zofran as well as taking Phenergan but ran out of Phenergan.  Is doing much better.  No recent episodes of significant nausea vomiting.  Stable condition.    Dyslipidemia  Chronic condition.  Was taking 10 mg of simvastatin but stopped medication about 6 weeks ago.  No recent cholesterol profile.    Obstructive sleep apnea syndrome  Chronic condition.  Stable.  Uses a CPAP machine.  Tolerated.       Current medicines (including changes today)  Current Outpatient Medications   Medication Sig Dispense Refill   • cimetidine (TAGAMET) 800 MG tablet Take 1 Tab by mouth every bedtime. 60 Tab 5   • promethazine (PHENERGAN) 25 MG Tab Take 1 Tab by mouth every 6 hours as needed for Nausea/Vomiting. 60 Tab 1   • pantoprazole (PROTONIX) 40 MG Tablet Delayed Response Take 40 mg by mouth 2 times a day.     • ondansetron (ZOFRAN ODT) 4 MG TABLET DISPERSIBLE Take 1 Tab by mouth every four hours as needed for Nausea (give PO if no IV route available). 10 Tab 0     No current facility-administered medications for this visit.      He  has a past medical history of Bronchitis, Chickenpox,  "GERD (gastroesophageal reflux disease), IBS (irritable bowel syndrome), Indigestion, Influenza, Pneumonia, Pulmonary embolism (HCC), Renal disorder, and Sleep apnea.    Social History and Family History were reviewed and updated.    ROS   No chest pain, no shortness of breath, no abdominal pain and all other systems were reviewed and are negative.       Objective:     /70 (BP Location: Right arm, Patient Position: Sitting, BP Cuff Size: Adult)   Pulse 92   Temp 37 °C (98.6 °F) (Temporal)   Resp 16   Ht 1.727 m (5' 8\")   Wt 81.6 kg (180 lb)   SpO2 95%  Body mass index is 27.37 kg/m².   Physical Exam:  Constitutional: Alert, no distress.  Skin: Warm, dry, good turgor, no rashes in visible areas.  Eye: Equal, round and reactive, conjunctiva clear, lids normal.  ENMT: Lips without lesions, good dentition, oropharynx clear.  Neck: Trachea midline, no masses.   Lymph: No cervical or supraclavicular lymphadenopathy  Respiratory: Unlabored respiratory effort, lungs appear clear, no wheezes.  Cardiovascular: Regular rate and rhythm.  Psych: Alert and oriented x3, normal affect and mood.        Assessment and Plan:   The following treatment plan was discussed    1. Gastroesophageal reflux disease with esophagitis  Chronic condition with recent exacerbation.  Provided Tagamet as directed.  May continue Protonix.  Also renewed Phenergan.  Make an appointment when available with GI for follow-up.  - cimetidine (TAGAMET) 800 MG tablet; Take 1 Tab by mouth every bedtime.  Dispense: 60 Tab; Refill: 5  - promethazine (PHENERGAN) 25 MG Tab; Take 1 Tab by mouth every 6 hours as needed for Nausea/Vomiting.  Dispense: 60 Tab; Refill: 1    2. Cyclical vomiting syndrome  Acute, new onset condition.  Resolved.  Make an appoint with GI.  Unknown if his THC use may be the cause of this syndrome.  Renewed medications as directed.  - cimetidine (TAGAMET) 800 MG tablet; Take 1 Tab by mouth every bedtime.  Dispense: 60 Tab; Refill: " 5  - promethazine (PHENERGAN) 25 MG Tab; Take 1 Tab by mouth every 6 hours as needed for Nausea/Vomiting.  Dispense: 60 Tab; Refill: 1    3. Dyslipidemia  Chronic condition.  Status unknown.  Check cholesterol profile.  Will await labs to see if he needs to be replaced on 10 mg or an increased dose of simvastatin.  - Lipid Profile; Future    4. Obstructive sleep apnea syndrome  Chronic condition.  Stable.  Continue CPAP use.      Followup: Return in about 6 months (around 7/14/2020), or if symptoms worsen or fail to improve.    Please note that this dictation was created using voice recognition software. I have made every reasonable attempt to correct obvious errors, but I expect that there are errors of grammar and possibly content that I did not discover before finalizing the note.

## 2020-01-14 NOTE — ASSESSMENT & PLAN NOTE
This is a 44-year-old male here today to discuss his follow-up from his hospital stay for 3 days secondary to nauseousness and vomiting.  Diagnosed with cyclical vomiting syndrome secondary to THC use.  He disagrees with diagnosis.  States that he has been using marijuana for his nauseousness for many years.  Uses about 4 times a week.  Has seen GI in the past but currently does not have insurance.  Has a history of strictures and Randle's esophagus.  Is currently on a PPI as well as a H2 blocker which had to be discontinued because it was taken off the market.  It was ranitidine.  Currently on Zofran as well as taking Phenergan but ran out of Phenergan.  Is doing much better.  No recent episodes of significant nausea vomiting.  Stable condition.

## 2020-01-20 DIAGNOSIS — R11.15 CYCLICAL VOMITING SYNDROME: ICD-10-CM

## 2020-01-20 DIAGNOSIS — K21.00 GASTROESOPHAGEAL REFLUX DISEASE WITH ESOPHAGITIS: ICD-10-CM

## 2020-01-20 RX ORDER — PROMETHAZINE HYDROCHLORIDE 25 MG/1
25 TABLET ORAL EVERY 6 HOURS PRN
Qty: 60 TAB | Refills: 1 | Status: SHIPPED | OUTPATIENT
Start: 2020-01-20 | End: 2020-01-23 | Stop reason: SDUPTHER

## 2020-01-20 RX ORDER — PANTOPRAZOLE SODIUM 40 MG/1
40 TABLET, DELAYED RELEASE ORAL 2 TIMES DAILY
Qty: 180 TAB | Refills: 1 | Status: SHIPPED | OUTPATIENT
Start: 2020-01-20 | End: 2020-01-23 | Stop reason: SDUPTHER

## 2020-01-20 RX ORDER — CIMETIDINE 800 MG
800 TABLET ORAL
Qty: 60 TAB | Refills: 5 | Status: SHIPPED | OUTPATIENT
Start: 2020-01-20 | End: 2020-01-23 | Stop reason: SDUPTHER

## 2020-01-23 DIAGNOSIS — K21.00 GASTROESOPHAGEAL REFLUX DISEASE WITH ESOPHAGITIS: ICD-10-CM

## 2020-01-23 DIAGNOSIS — R11.15 CYCLICAL VOMITING SYNDROME: ICD-10-CM

## 2020-01-23 RX ORDER — CIMETIDINE 800 MG
800 TABLET ORAL
Qty: 60 TAB | Refills: 5 | Status: SHIPPED
Start: 2020-01-23 | End: 2020-12-21

## 2020-01-23 RX ORDER — PANTOPRAZOLE SODIUM 40 MG/1
40 TABLET, DELAYED RELEASE ORAL 2 TIMES DAILY
Qty: 180 TAB | Refills: 1 | Status: SHIPPED | OUTPATIENT
Start: 2020-01-23 | End: 2020-08-13 | Stop reason: SDUPTHER

## 2020-01-23 RX ORDER — PROMETHAZINE HYDROCHLORIDE 25 MG/1
25 TABLET ORAL EVERY 6 HOURS PRN
Qty: 60 TAB | Refills: 1 | Status: SHIPPED | OUTPATIENT
Start: 2020-01-23 | End: 2020-08-13 | Stop reason: SDUPTHER

## 2020-01-26 DIAGNOSIS — E78.5 DYSLIPIDEMIA: ICD-10-CM

## 2020-01-26 RX ORDER — SIMVASTATIN 10 MG
10 TABLET ORAL EVERY EVENING
Qty: 90 TAB | Refills: 1 | Status: SHIPPED | OUTPATIENT
Start: 2020-01-26 | End: 2020-08-13 | Stop reason: SDUPTHER

## 2020-11-18 NOTE — PROGRESS NOTES
HR=53 bpm, HNGY=449/66 mmhg, SpO2=92 %, Resp=15 B/min, EtCO2=10 mmHg, Apnea=1 Seconds, Rodrigues=3 Pt up to floor via gurney. Transferred to bed safely.

## 2020-12-21 ENCOUNTER — OFFICE VISIT (OUTPATIENT)
Dept: MEDICAL GROUP | Facility: MEDICAL CENTER | Age: 45
End: 2020-12-21

## 2020-12-21 VITALS
WEIGHT: 195 LBS | HEART RATE: 100 BPM | TEMPERATURE: 97.7 F | HEIGHT: 68 IN | DIASTOLIC BLOOD PRESSURE: 76 MMHG | BODY MASS INDEX: 29.55 KG/M2 | OXYGEN SATURATION: 96 % | SYSTOLIC BLOOD PRESSURE: 122 MMHG | RESPIRATION RATE: 16 BRPM

## 2020-12-21 DIAGNOSIS — K21.9 GASTROESOPHAGEAL REFLUX DISEASE WITHOUT ESOPHAGITIS: ICD-10-CM

## 2020-12-21 DIAGNOSIS — E78.5 DYSLIPIDEMIA: ICD-10-CM

## 2020-12-21 DIAGNOSIS — M17.12 PRIMARY OSTEOARTHRITIS OF LEFT KNEE: ICD-10-CM

## 2020-12-21 DIAGNOSIS — R11.15 CYCLICAL VOMITING SYNDROME: ICD-10-CM

## 2020-12-21 PROCEDURE — 99214 OFFICE O/P EST MOD 30 MIN: CPT | Performed by: PHYSICIAN ASSISTANT

## 2020-12-21 RX ORDER — SIMVASTATIN 20 MG
20 TABLET ORAL EVERY EVENING
Qty: 90 TAB | Refills: 2 | Status: SHIPPED | OUTPATIENT
Start: 2020-12-21 | End: 2021-10-22

## 2020-12-21 RX ORDER — PANTOPRAZOLE SODIUM 40 MG/1
40 TABLET, DELAYED RELEASE ORAL 2 TIMES DAILY
Qty: 180 TAB | Refills: 2 | Status: SHIPPED | OUTPATIENT
Start: 2020-12-21 | End: 2021-10-22

## 2020-12-21 RX ORDER — IBUPROFEN 800 MG/1
TABLET ORAL
COMMUNITY
Start: 2020-11-06 | End: 2022-02-23

## 2020-12-21 ASSESSMENT — FIBROSIS 4 INDEX: FIB4 SCORE: 4.16

## 2020-12-21 NOTE — PROGRESS NOTES
Subjective:   Jerome Cardoza Jr. is a 45 y.o. male here today for GERD, arthritis and dyslipidemia.    Gastroesophageal reflux disease without esophagitis  This is a pleasant 45-year-old male here today to follow-up on his health.  Chronic history of reflux and cyclical vomiting syndrome.  Has been doing well over the past year.  At least this year.  Only had one episode many months ago.  Taking Protonix 40 mg twice daily.  Still uses marijuana almost daily for his chronic pain of his knees especially the left knee.  Is not requesting any refill of Zofran.    Primary osteoarthritis of left knee  Had a partial knee replacement on the left side.  Believes he may need a full knee replacement in the future.  Follows with orthopedics at [facility].    Dyslipidemia  Chronic condition.  I did start him on Zocor 10 mg.  LDL was at 138.  He is due for labs.       Current medicines (including changes today)  Current Outpatient Medications   Medication Sig Dispense Refill   • ibuprofen (MOTRIN) 800 MG Tab      • pantoprazole (PROTONIX) 40 MG Tablet Delayed Response Take 1 Tab by mouth 2 times a day. 180 Tab 2   • simvastatin (ZOCOR) 20 MG Tab Take 1 Tab by mouth every evening. 90 Tab 2   • ondansetron (ZOFRAN ODT) 4 MG TABLET DISPERSIBLE Take 1 Tab by mouth every four hours as needed for Nausea (give PO if no IV route available). 10 Tab 0     No current facility-administered medications for this visit.      He  has a past medical history of Bronchitis, Chickenpox, GERD (gastroesophageal reflux disease), IBS (irritable bowel syndrome), Indigestion, Influenza, Pneumonia, Pulmonary embolism (HCC), Renal disorder, and Sleep apnea.    Social History and Family History were reviewed and updated.    ROS   No chest pain, no shortness of breath, no abdominal pain and all other systems were reviewed and are negative.       Objective:     /76   Pulse 100   Temp 36.5 °C (97.7 °F) (Temporal)   Resp 16   Ht  "1.727 m (5' 8\")   Wt 88.5 kg (195 lb)   SpO2 96%  Body mass index is 29.65 kg/m².   Physical Exam:  Constitutional: Alert, no distress.  Skin: Warm, dry, good turgor, no rashes in visible areas.  Eye: Equal, round and reactive, conjunctiva clear, lids normal.  ENMT: Lips without lesions, good dentition, oropharynx clear.  Neck: Trachea midline, no masses.   Lymph: No cervical or supraclavicular lymphadenopathy  Respiratory: Unlabored respiratory effort, lungs appear clear, no wheezes.  Cardiovascular: Regular rate rhythm.  Psych: Alert and oriented x3, normal affect and mood.        Assessment and Plan:   The following treatment plan was discussed    1. Gastroesophageal reflux disease without esophagitis  Chronic condition.  Stable.  Renewed Protonix 40 mg twice daily.  - pantoprazole (PROTONIX) 40 MG Tablet Delayed Response; Take 1 Tab by mouth 2 times a day.  Dispense: 180 Tab; Refill: 2    2. Cyclical vomiting syndrome  Chronic condition.  Stable.  Renewed Protonix as directed.  Continue Zofran use.  No refill needed today.  - pantoprazole (PROTONIX) 40 MG Tablet Delayed Response; Take 1 Tab by mouth 2 times a day.  Dispense: 180 Tab; Refill: 2    3. Dyslipidemia  Chronic condition.  Status unknown.  Ordered labs.  Will increase Zocor to 20 mg.  Obtain labs 1 month after starting new dose of medication.  - simvastatin (ZOCOR) 20 MG Tab; Take 1 Tab by mouth every evening.  Dispense: 90 Tab; Refill: 2  - Lipid Profile; Future  - Comp Metabolic Panel; Future    4. Primary osteoarthritis of left knee  Chronic condition.  Stable.  Follow with orthopedics when needed.      Followup: Return in about 6 months (around 6/21/2021), or if symptoms worsen or fail to improve.    Please note that this dictation was created using voice recognition software. I have made every reasonable attempt to correct obvious errors, but I expect that there are errors of grammar and possibly content that I did not discover before finalizing " the note.

## 2020-12-21 NOTE — ASSESSMENT & PLAN NOTE
Had a partial knee replacement on the left side.  Believes he may need a full knee replacement in the future.  Follows with orthopedics at [facility].

## 2020-12-21 NOTE — ASSESSMENT & PLAN NOTE
This is a pleasant 45-year-old male here today to follow-up on his health.  Chronic history of reflux and cyclical vomiting syndrome.  Has been doing well over the past year.  At least this year.  Only had one episode many months ago.  Taking Protonix 40 mg twice daily.  Still uses marijuana almost daily for his chronic pain of his knees especially the left knee.  Is not requesting any refill of Zofran.

## 2021-02-02 ENCOUNTER — HOSPITAL ENCOUNTER (OUTPATIENT)
Dept: LAB | Facility: MEDICAL CENTER | Age: 46
End: 2021-02-02
Attending: PHYSICIAN ASSISTANT

## 2021-02-02 DIAGNOSIS — E78.5 DYSLIPIDEMIA: ICD-10-CM

## 2021-02-02 LAB
ALBUMIN SERPL BCP-MCNC: 4.5 G/DL (ref 3.2–4.9)
ALBUMIN/GLOB SERPL: 1.9 G/DL
ALP SERPL-CCNC: 82 U/L (ref 30–99)
ALT SERPL-CCNC: 18 U/L (ref 2–50)
ANION GAP SERPL CALC-SCNC: 12 MMOL/L (ref 7–16)
AST SERPL-CCNC: 14 U/L (ref 12–45)
BILIRUB SERPL-MCNC: 0.2 MG/DL (ref 0.1–1.5)
BUN SERPL-MCNC: 17 MG/DL (ref 8–22)
CALCIUM SERPL-MCNC: 9.8 MG/DL (ref 8.5–10.5)
CHLORIDE SERPL-SCNC: 106 MMOL/L (ref 96–112)
CHOLEST SERPL-MCNC: 140 MG/DL (ref 100–199)
CO2 SERPL-SCNC: 21 MMOL/L (ref 20–33)
CREAT SERPL-MCNC: 0.91 MG/DL (ref 0.5–1.4)
FASTING STATUS PATIENT QL REPORTED: NORMAL
GLOBULIN SER CALC-MCNC: 2.4 G/DL (ref 1.9–3.5)
GLUCOSE SERPL-MCNC: 99 MG/DL (ref 65–99)
HDLC SERPL-MCNC: 40 MG/DL
LDLC SERPL CALC-MCNC: 69 MG/DL
POTASSIUM SERPL-SCNC: 4.5 MMOL/L (ref 3.6–5.5)
PROT SERPL-MCNC: 6.9 G/DL (ref 6–8.2)
SODIUM SERPL-SCNC: 139 MMOL/L (ref 135–145)
TRIGL SERPL-MCNC: 154 MG/DL (ref 0–149)

## 2021-02-02 PROCEDURE — 80053 COMPREHEN METABOLIC PANEL: CPT

## 2021-02-02 PROCEDURE — 36415 COLL VENOUS BLD VENIPUNCTURE: CPT

## 2021-02-02 PROCEDURE — 80061 LIPID PANEL: CPT

## 2021-03-28 ENCOUNTER — HOSPITAL ENCOUNTER (EMERGENCY)
Facility: MEDICAL CENTER | Age: 46
End: 2021-03-28
Attending: EMERGENCY MEDICINE

## 2021-03-28 VITALS
WEIGHT: 184.97 LBS | RESPIRATION RATE: 22 BRPM | DIASTOLIC BLOOD PRESSURE: 82 MMHG | TEMPERATURE: 97.9 F | HEIGHT: 68 IN | HEART RATE: 94 BPM | SYSTOLIC BLOOD PRESSURE: 123 MMHG | OXYGEN SATURATION: 95 % | BODY MASS INDEX: 28.03 KG/M2

## 2021-03-28 DIAGNOSIS — R11.15 CYCLICAL VOMITING: ICD-10-CM

## 2021-03-28 LAB
ALBUMIN SERPL BCP-MCNC: 5.5 G/DL (ref 3.2–4.9)
ALBUMIN/GLOB SERPL: 1.6 G/DL
ALP SERPL-CCNC: 104 U/L (ref 30–99)
ALT SERPL-CCNC: 32 U/L (ref 2–50)
ANION GAP SERPL CALC-SCNC: 13 MMOL/L (ref 7–16)
ANION GAP SERPL CALC-SCNC: 20 MMOL/L (ref 7–16)
AST SERPL-CCNC: 22 U/L (ref 12–45)
BASOPHILS # BLD AUTO: 0.3 % (ref 0–1.8)
BASOPHILS # BLD: 0.04 K/UL (ref 0–0.12)
BILIRUB SERPL-MCNC: 0.7 MG/DL (ref 0.1–1.5)
BUN SERPL-MCNC: 31 MG/DL (ref 8–22)
BUN SERPL-MCNC: 32 MG/DL (ref 8–22)
CALCIUM SERPL-MCNC: 10.7 MG/DL (ref 8.4–10.2)
CALCIUM SERPL-MCNC: 9.2 MG/DL (ref 8.4–10.2)
CHLORIDE SERPL-SCNC: 97 MMOL/L (ref 96–112)
CHLORIDE SERPL-SCNC: 99 MMOL/L (ref 96–112)
CO2 SERPL-SCNC: 19 MMOL/L (ref 20–33)
CO2 SERPL-SCNC: 23 MMOL/L (ref 20–33)
COMMENT 1642: NORMAL
CREAT SERPL-MCNC: 1.15 MG/DL (ref 0.5–1.4)
CREAT SERPL-MCNC: 1.4 MG/DL (ref 0.5–1.4)
EOSINOPHIL # BLD AUTO: 0.01 K/UL (ref 0–0.51)
EOSINOPHIL NFR BLD: 0.1 % (ref 0–6.9)
ERYTHROCYTE [DISTWIDTH] IN BLOOD BY AUTOMATED COUNT: 36.9 FL (ref 35.9–50)
GLOBULIN SER CALC-MCNC: 3.4 G/DL (ref 1.9–3.5)
GLUCOSE SERPL-MCNC: 108 MG/DL (ref 65–99)
GLUCOSE SERPL-MCNC: 139 MG/DL (ref 65–99)
HCT VFR BLD AUTO: 52 % (ref 42–52)
HGB BLD-MCNC: 18.4 G/DL (ref 14–18)
IMM GRANULOCYTES # BLD AUTO: 0.08 K/UL (ref 0–0.11)
IMM GRANULOCYTES NFR BLD AUTO: 0.6 % (ref 0–0.9)
LACTATE BLD-SCNC: 1.9 MMOL/L (ref 0.5–2)
LACTATE BLD-SCNC: 3.1 MMOL/L (ref 0.5–2)
LIPASE SERPL-CCNC: 59 U/L (ref 7–58)
LYMPHOCYTES # BLD AUTO: 2.44 K/UL (ref 1–4.8)
LYMPHOCYTES NFR BLD: 18.3 % (ref 22–41)
MCH RBC QN AUTO: 30.1 PG (ref 27–33)
MCHC RBC AUTO-ENTMCNC: 35.4 G/DL (ref 33.7–35.3)
MCV RBC AUTO: 85.1 FL (ref 81.4–97.8)
MONOCYTES # BLD AUTO: 0.78 K/UL (ref 0–0.85)
MONOCYTES NFR BLD AUTO: 5.9 % (ref 0–13.4)
NEUTROPHILS # BLD AUTO: 9.95 K/UL (ref 1.82–7.42)
NEUTROPHILS NFR BLD: 74.8 % (ref 44–72)
NRBC # BLD AUTO: 0 K/UL
NRBC BLD-RTO: 0 /100 WBC
PLATELET # BLD AUTO: 107 K/UL (ref 164–446)
PMV BLD AUTO: 10.3 FL (ref 9–12.9)
POTASSIUM SERPL-SCNC: 3.9 MMOL/L (ref 3.6–5.5)
POTASSIUM SERPL-SCNC: 4.1 MMOL/L (ref 3.6–5.5)
PROT SERPL-MCNC: 8.9 G/DL (ref 6–8.2)
RBC # BLD AUTO: 6.11 M/UL (ref 4.7–6.1)
SODIUM SERPL-SCNC: 135 MMOL/L (ref 135–145)
SODIUM SERPL-SCNC: 136 MMOL/L (ref 135–145)
WBC # BLD AUTO: 13.3 K/UL (ref 4.8–10.8)

## 2021-03-28 PROCEDURE — 96374 THER/PROPH/DIAG INJ IV PUSH: CPT

## 2021-03-28 PROCEDURE — 96372 THER/PROPH/DIAG INJ SC/IM: CPT

## 2021-03-28 PROCEDURE — 700105 HCHG RX REV CODE 258: Performed by: EMERGENCY MEDICINE

## 2021-03-28 PROCEDURE — 80053 COMPREHEN METABOLIC PANEL: CPT

## 2021-03-28 PROCEDURE — 83690 ASSAY OF LIPASE: CPT

## 2021-03-28 PROCEDURE — 85025 COMPLETE CBC W/AUTO DIFF WBC: CPT

## 2021-03-28 PROCEDURE — 700111 HCHG RX REV CODE 636 W/ 250 OVERRIDE (IP): Performed by: EMERGENCY MEDICINE

## 2021-03-28 PROCEDURE — 83605 ASSAY OF LACTIC ACID: CPT

## 2021-03-28 PROCEDURE — 36415 COLL VENOUS BLD VENIPUNCTURE: CPT

## 2021-03-28 PROCEDURE — 96375 TX/PRO/DX INJ NEW DRUG ADDON: CPT

## 2021-03-28 PROCEDURE — 99285 EMERGENCY DEPT VISIT HI MDM: CPT

## 2021-03-28 PROCEDURE — 80048 BASIC METABOLIC PNL TOTAL CA: CPT

## 2021-03-28 RX ORDER — DIPHENHYDRAMINE HYDROCHLORIDE 50 MG/ML
25 INJECTION INTRAMUSCULAR; INTRAVENOUS ONCE
Status: COMPLETED | OUTPATIENT
Start: 2021-03-28 | End: 2021-03-28

## 2021-03-28 RX ORDER — DICYCLOMINE HYDROCHLORIDE 10 MG/ML
20 INJECTION INTRAMUSCULAR ONCE
Status: COMPLETED | OUTPATIENT
Start: 2021-03-28 | End: 2021-03-28

## 2021-03-28 RX ORDER — SODIUM CHLORIDE, SODIUM LACTATE, POTASSIUM CHLORIDE, CALCIUM CHLORIDE 600; 310; 30; 20 MG/100ML; MG/100ML; MG/100ML; MG/100ML
2000 INJECTION, SOLUTION INTRAVENOUS ONCE
Status: COMPLETED | OUTPATIENT
Start: 2021-03-28 | End: 2021-03-28

## 2021-03-28 RX ORDER — HALOPERIDOL 5 MG/ML
2 INJECTION INTRAMUSCULAR ONCE
Status: COMPLETED | OUTPATIENT
Start: 2021-03-28 | End: 2021-03-28

## 2021-03-28 RX ORDER — CAPSAICIN 0.75 MG/G
CREAM TOPICAL
Qty: 57 G | Refills: 0 | Status: SHIPPED | OUTPATIENT
Start: 2021-03-28 | End: 2022-02-23

## 2021-03-28 RX ORDER — KETOROLAC TROMETHAMINE 30 MG/ML
15 INJECTION, SOLUTION INTRAMUSCULAR; INTRAVENOUS ONCE
Status: COMPLETED | OUTPATIENT
Start: 2021-03-28 | End: 2021-03-28

## 2021-03-28 RX ORDER — SODIUM CHLORIDE, SODIUM LACTATE, POTASSIUM CHLORIDE, CALCIUM CHLORIDE 600; 310; 30; 20 MG/100ML; MG/100ML; MG/100ML; MG/100ML
1000 INJECTION, SOLUTION INTRAVENOUS ONCE
Status: COMPLETED | OUTPATIENT
Start: 2021-03-28 | End: 2021-03-28

## 2021-03-28 RX ADMIN — SODIUM CHLORIDE, POTASSIUM CHLORIDE, SODIUM LACTATE AND CALCIUM CHLORIDE 2000 ML: 600; 310; 30; 20 INJECTION, SOLUTION INTRAVENOUS at 17:44

## 2021-03-28 RX ADMIN — DICYCLOMINE HYDROCHLORIDE 20 MG: 20 INJECTION, SOLUTION INTRAMUSCULAR at 18:19

## 2021-03-28 RX ADMIN — HALOPERIDOL LACTATE 2 MG: 5 INJECTION, SOLUTION INTRAMUSCULAR at 17:39

## 2021-03-28 RX ADMIN — SODIUM CHLORIDE, POTASSIUM CHLORIDE, SODIUM LACTATE AND CALCIUM CHLORIDE 1000 ML: 600; 310; 30; 20 INJECTION, SOLUTION INTRAVENOUS at 19:52

## 2021-03-28 RX ADMIN — KETOROLAC TROMETHAMINE 15 MG: 30 INJECTION, SOLUTION INTRAMUSCULAR at 18:20

## 2021-03-28 RX ADMIN — FENTANYL CITRATE 50 MCG: 50 INJECTION, SOLUTION INTRAMUSCULAR; INTRAVENOUS at 18:19

## 2021-03-28 RX ADMIN — DIPHENHYDRAMINE HYDROCHLORIDE 25 MG: 50 INJECTION, SOLUTION INTRAMUSCULAR; INTRAVENOUS at 17:39

## 2021-03-28 ASSESSMENT — LIFESTYLE VARIABLES
TOTAL SCORE: 0
EVER FELT BAD OR GUILTY ABOUT YOUR DRINKING: NO
EVER HAD A DRINK FIRST THING IN THE MORNING TO STEADY YOUR NERVES TO GET RID OF A HANGOVER: NO
DO YOU DRINK ALCOHOL: NO
TOTAL SCORE: 0
TOTAL SCORE: 0
AVERAGE NUMBER OF DAYS PER WEEK YOU HAVE A DRINK CONTAINING ALCOHOL: 0
CONSUMPTION TOTAL: NEGATIVE
HAVE YOU EVER FELT YOU SHOULD CUT DOWN ON YOUR DRINKING: NO
HOW MANY TIMES IN THE PAST YEAR HAVE YOU HAD 5 OR MORE DRINKS IN A DAY: 0
ON A TYPICAL DAY WHEN YOU DRINK ALCOHOL HOW MANY DRINKS DO YOU HAVE: 0
HAVE PEOPLE ANNOYED YOU BY CRITICIZING YOUR DRINKING: NO

## 2021-03-28 ASSESSMENT — FIBROSIS 4 INDEX: FIB4 SCORE: 2.93

## 2021-03-29 NOTE — ED PROVIDER NOTES
"ED Provider Note    Scribed for Jakub Maher M.D. by Jakub Maher M.D.. 3/28/2021,  5:17 PM.    CHIEF COMPLAINT  Chief Complaint   Patient presents with    N/V     Reports he has been \"puking singe friday morning, I can't stop\".     Abdominal Pain     Reports generalised abd pain. Denies fevers.        HPI  Jerome Cardoza Jr. is a alert, cooperative, and very uncomfortable appearing 46 y.o. male with a history of cyclic vomiting syndrome, GERD, IBS, previous renal failure from dehydration, who presents to the Emergency Department for intermittent, ongoing vomiting since Friday.  He has not had relief from \"all the nausea medications,\" reporting that he has several different types at home, including Phenergan suppositories, which have not relieved his vomiting.  Vomiting is nonbloody, nonbilious, there is clear, yellow-tinged emesis in his emesis bag.  He says that there are no obvious triggers for these episodes, but he has not had one for a couple of years.  He is a daily marijuana user, which we discussed as a possible trigger.  He indicates that hot showers and heat packs on his abdomen sometimes improve his symptoms.  We discussed again that this is a fairly clear indicator of marijuana hyperemesis.  He also has a history of significant GERD, as above, for which he takes twice daily antacids.  He denies fevers.  He reports generalized abdominal soreness but no localizing pain.  He feels this is from the vomiting.  He has not had cough or shortness of breath or dysuria, constipation or diarrhea.     REVIEW OF SYSTEMS  See HPI for further details. All other systems are negative.     PAST MEDICAL HISTORY   has a past medical history of Bronchitis, Chickenpox, GERD (gastroesophageal reflux disease), IBS (irritable bowel syndrome), Indigestion, Influenza, Pneumonia, Pulmonary embolism (HCC), Renal disorder, and Sleep apnea.    SOCIAL HISTORY  Social History     Tobacco Use    Smoking status: " "Current Every Day Smoker     Packs/day: 0.50     Years: 22.00     Pack years: 11.00     Types: Cigarettes    Smokeless tobacco: Never Used   Substance and Sexual Activity    Alcohol use: No    Drug use: Not Currently     Frequency: 4.0 times per week     Types: Marijuana    Sexual activity: Yes     Partners: Female     Comment: , 3 children     Social History     Substance and Sexual Activity   Drug Use Not Currently    Frequency: 4.0 times per week    Types: Marijuana       SURGICAL HISTORY   has a past surgical history that includes asher by laparoscopy; tonsillectomy; other orthopedic surgery; tonsillectomy; and knee reconstruction (Left).    CURRENT MEDICATIONS  Home Medications    **Home medications have not yet been reviewed for this encounter**         ALLERGIES  Allergies   Allergen Reactions    Fish Anaphylaxis     [place] and tuna    Hops Oil Anaphylaxis    Barley Grass Anaphylaxis    Green Beans Shortness of Breath and Nausea    Nicoderm [Nicotine] Rash, Itching and Swelling     Blisters noted to patch site    Turkey Shortness of Breath and Nausea       PHYSICAL EXAM  VITAL SIGNS: /82   Pulse 94   Temp 36.6 °C (97.9 °F) (Temporal)   Resp (!) 22   Ht 1.727 m (5' 8\")   Wt 83.9 kg (184 lb 15.5 oz)   SpO2 95%   BMI 28.12 kg/m²   Pulse ox interpretation: I interpret this pulse ox as normal.  Constitutional: Alert in significant distress.  HENT: No signs of trauma, Bilateral external ears normal, Nose normal.   Eyes: Conjunctiva normal, Non-icteric.   Neck: Normal range of motion, Supple, No stridor.   Lymphatic: No lymphadenopathy noted.   Cardiovascular: Regular tachycardic rate and rhythm, no murmurs.   Thorax & Lungs: Normal breath sounds, No respiratory distress, No wheezing, No chest tenderness.   Abdomen: Bowel sounds normal, Soft, No tenderness, No masses, No pulsatile masses. No peritoneal signs.  Skin: Warm, Dry, No erythema, No rash.  Mild diffuse tenderness without peritoneal " signs.  Normal bowel sounds.  Extremities: Intact distal pulses, No edema, No cyanosis.  Musculoskeletal: Good range of motion in all major joints. No or major deformities noted.   Neurologic: Alert , Normal motor function, Normal sensory function, No focal deficits noted.   Psychiatric: Affect normal, Judgment normal, Mood normal.     DIAGNOSTIC STUDIES / PROCEDURES    LABS  Labs Reviewed   CBC WITH DIFFERENTIAL - Abnormal; Notable for the following components:       Result Value    WBC 13.3 (*)     RBC 6.11 (*)     Hemoglobin 18.4 (*)     MCHC 35.4 (*)     Platelet Count 107 (*)     Neutrophils-Polys 74.80 (*)     Lymphocytes 18.30 (*)     Neutrophils (Absolute) 9.95 (*)     All other components within normal limits   COMP METABOLIC PANEL - Abnormal; Notable for the following components:    Co2 19 (*)     Anion Gap 20.0 (*)     Glucose 139 (*)     Bun 32 (*)     Calcium 10.7 (*)     Alkaline Phosphatase 104 (*)     Albumin 5.5 (*)     Total Protein 8.9 (*)     All other components within normal limits   LIPASE - Abnormal; Notable for the following components:    Lipase 59 (*)     All other components within normal limits   LACTIC ACID - Abnormal; Notable for the following components:    Lactic Acid 3.1 (*)     All other components within normal limits   ESTIMATED GFR - Abnormal; Notable for the following components:    GFR If Non  54 (*)     All other components within normal limits   BASIC METABOLIC PANEL - Abnormal; Notable for the following components:    Glucose 108 (*)     Bun 31 (*)     All other components within normal limits   DIFFERENTIAL COMMENT   LACTIC ACID   ESTIMATED GFR     All labs reviewed by me.    RADIOLOGY  No orders to display     The radiologist's interpretation of all radiological studies have been reviewed by me.    COURSE & MEDICAL DECISION MAKING  Nursing notes, VS, PMSFHx reviewed in chart.     5:17 PM Patient seen and examined at bedside. Differential diagnosis includes  but is not limited to cyclic vomiting syndrome, marijuana hyperemesis, dehydration, acute kidney injury, electrolyte abnormality, less likely appendicitis per the patient has a history of laparoscopic cholecystectomy. ordered for screening laboratory tests to evaluate. Patient will be treated with 2 L LR bolus, Haldol, and Benadryl for his symptoms.     7:11 PM patient is feeling improved.  Tachycardia has resolved.  We will repeat a lactic acid and BMP.    This patient looks much better, and his abnormal laboratory tests normalized after IV fluids.  We discussed that there are no signs of infection and that his abnormal lab testing seems secondary to dehydration, which itself seems caused by cyclic vomiting/marijuana hyperemesis.  I encouraged him to take a long, 2 or 3-month drug holiday from marijuana.  He indicates understanding of this recommendation.  He will be discharged with a prescription for capsaicin cream, and we discussed its use.     The patient will return for new or worsening symptoms and is stable at the time of discharge.    The patient is referred to a primary physician for blood pressure management, diabetic screening, and for all other preventative health concerns.    DISPOSITION:  Patient will be discharged home in stable condition.    FOLLOW UP:  Andrea Machado, P.A.-C.  [address] [facility] [address]  [place] [id number] [phone]    Schedule an appointment as soon as possible for a visit         OUTPATIENT MEDICATIONS:  Discharge Medication List as of 3/28/2021  8:38 PM        START taking these medications    Details   capsicum (ZOSTRIX) 0.075 % topical cream Apply a quarter sized amount on to your abdomen and rub it in to help with symptoms of nausea and vomiting from cyclic vomiting syndrome., Disp-57 g, R-0, Print Rx Paper             FINAL IMPRESSION  1. Cyclical vomiting

## 2021-03-29 NOTE — ED NOTES
Pt states a marked decrease in pain level after administration of prescribed interventions.  Transferred care to Marcos NEGRO.

## 2021-03-29 NOTE — ED NOTES
Expected safety measures such as locked and lowest setting gurney, side-rails up, accessible call light have been properly implemented.  Pt verbalizes understanding of these precautions and is able to identify potential needs.   Pt has been medicated extensively, as prescribed.

## 2021-03-29 NOTE — DISCHARGE INSTRUCTIONS
Your first set of labs showed some mild dehydration, which resolved after fluids on your second set of labs.  Please schedule follow-up with your primary care doctor.  If these episodes of cyclic vomiting become more frequent, your doctor may want to refer you to a gastroenterologist.  Many people find relief from a cyclic vomiting with capsaicin cream rubbed on the abdomen.  Capsaicin cream uses an extract from hot peppers to create a warm sensation on the skin.

## 2021-03-29 NOTE — ED NOTES
"Presents accompanied by his spouse.  Pt C/O acute onset of diffuse abdominal pain with associated episodic N/V recurring since this past Friday.  Chief Complaint   Patient presents with   • N/V     Reports he has been \"puking singe friday morning, I can't stop\".    • Abdominal Pain     Reports generalised abd pain. Denies fevers.      /109   Pulse (!) 113   Temp 36.6 °C (97.9 °F) (Temporal)   Resp (!) 22   Ht 1.727 m (5' 8\")   Wt 83.9 kg (184 lb 15.5 oz)   SpO2 95%   BMI 28.12 kg/m²      "

## 2021-06-01 NOTE — ED NOTES
Pt assessed, IV placed. C/o CP and SOB with tachypnea and anxiety that began this am after he started vomiting. Pt reports hx PE in September, takes Eloquis BID. Pt reports the pain in his left chest, radiating into jaw. Pt also reports abdominal pain that is diffuse. Denies fever/diarrhea. Call light within reach, blood in lab. Will cont to monitor.    Detail Level: Detailed

## 2021-07-09 ENCOUNTER — APPOINTMENT (OUTPATIENT)
Dept: MEDICAL GROUP | Facility: MEDICAL CENTER | Age: 46
End: 2021-07-09

## 2022-02-22 ENCOUNTER — TELEPHONE (OUTPATIENT)
Dept: MEDICAL GROUP | Facility: MEDICAL CENTER | Age: 47
End: 2022-02-22
Payer: COMMERCIAL

## 2022-02-22 NOTE — TELEPHONE ENCOUNTER
----- Message from Jeorme Cardoza Jr. sent at 2/21/2022  1:23 PM PST -----  Regarding: Prescription refills  Hi Dr Machado, I need to get a refill of protonics and simistatin …pharmacy says I need to make an appointment to see you. That all fine. I can come see you but my chart isn’t letting me make an appointment.. and I I have only one day left of scripts.  I also have moved to [place] make it more of challenge. I need to also have scripts sent to [facility] in [place]. I’m sure I’m due for blood work aswell. Thanks .

## 2022-02-23 ENCOUNTER — OFFICE VISIT (OUTPATIENT)
Dept: MEDICAL GROUP | Facility: MEDICAL CENTER | Age: 47
End: 2022-02-23
Payer: COMMERCIAL

## 2022-02-23 VITALS
BODY MASS INDEX: 29.55 KG/M2 | HEART RATE: 89 BPM | SYSTOLIC BLOOD PRESSURE: 106 MMHG | TEMPERATURE: 99 F | HEIGHT: 68 IN | DIASTOLIC BLOOD PRESSURE: 70 MMHG | WEIGHT: 195 LBS | OXYGEN SATURATION: 95 %

## 2022-02-23 DIAGNOSIS — R11.15 CYCLICAL VOMITING SYNDROME: ICD-10-CM

## 2022-02-23 DIAGNOSIS — K22.70 BARRETT'S ESOPHAGUS WITHOUT DYSPLASIA: ICD-10-CM

## 2022-02-23 DIAGNOSIS — K21.9 GASTROESOPHAGEAL REFLUX DISEASE WITHOUT ESOPHAGITIS: ICD-10-CM

## 2022-02-23 DIAGNOSIS — E78.5 DYSLIPIDEMIA: ICD-10-CM

## 2022-02-23 DIAGNOSIS — Z00.00 PREVENTATIVE HEALTH CARE: ICD-10-CM

## 2022-02-23 PROCEDURE — 99214 OFFICE O/P EST MOD 30 MIN: CPT | Performed by: PHYSICIAN ASSISTANT

## 2022-02-23 RX ORDER — PROMETHAZINE HYDROCHLORIDE 25 MG/1
25 TABLET ORAL EVERY 6 HOURS PRN
Qty: 60 TABLET | Refills: 1 | Status: SHIPPED | OUTPATIENT
Start: 2022-02-23 | End: 2023-09-11 | Stop reason: SDUPTHER

## 2022-02-23 RX ORDER — PANTOPRAZOLE SODIUM 40 MG/1
40 TABLET, DELAYED RELEASE ORAL 2 TIMES DAILY
Qty: 180 TABLET | Refills: 2 | Status: SHIPPED | OUTPATIENT
Start: 2022-02-23 | End: 2022-12-19

## 2022-02-23 RX ORDER — SIMVASTATIN 20 MG
20 TABLET ORAL EVERY EVENING
Qty: 90 TABLET | Refills: 2 | Status: SHIPPED | OUTPATIENT
Start: 2022-02-23 | End: 2022-12-23 | Stop reason: SDUPTHER

## 2022-02-23 RX ORDER — ONDANSETRON 4 MG/1
4 TABLET, ORALLY DISINTEGRATING ORAL EVERY 4 HOURS PRN
Qty: 30 TABLET | Refills: 1 | Status: SHIPPED | OUTPATIENT
Start: 2022-02-23 | End: 2023-09-11 | Stop reason: SDUPTHER

## 2022-02-23 ASSESSMENT — FIBROSIS 4 INDEX: FIB4 SCORE: 1.71

## 2022-02-23 ASSESSMENT — PATIENT HEALTH QUESTIONNAIRE - PHQ9: CLINICAL INTERPRETATION OF PHQ2 SCORE: 0

## 2022-02-23 NOTE — ASSESSMENT & PLAN NOTE
Chronic history of dyslipidemia.  Currently on Zocor 20 mg daily.  Is due for labs.  History of MI with family members.

## 2022-02-23 NOTE — PROGRESS NOTES
Subjective:   Jerome Cardoza Jr. is a 46 y.o. male here today for cyclical vomiting syndrome, reflux and a history of Randle's esophagus.  Also history of dyslipidemia.    Cyclical vomiting syndrome  This is a pleasant 46-year-old male who is here today to discuss refills of medication.  I have not seen him in over a year.  He has a history of cyclical vomiting syndrome but recently has done quite well.  Vomiting has not gotten to the point where he has had tovisit the ED.  He states that his medications for vomiting are likely .  He has both Phenergan and Zofran at home.  If Phenergan does not work then he will take Zofran.  He also needs a renewal of his PPI.  Does have a history of reflux and Randle's esophagus.  It appears that he is due for an upper endoscopy.    Dyslipidemia  Chronic history of dyslipidemia.  Currently on Zocor 20 mg daily.  Is due for labs.  History of MI with family members.       Current medicines (including changes today)  Current Outpatient Medications   Medication Sig Dispense Refill   • pantoprazole (PROTONIX) 40 MG Tablet Delayed Response Take 1 Tablet by mouth 2 times a day. 180 Tablet 2   • simvastatin (ZOCOR) 20 MG Tab Take 1 Tablet by mouth every evening. 90 Tablet 2   • promethazine (PHENERGAN) 25 MG Tab Take 1 Tablet by mouth every 6 hours as needed for Nausea/Vomiting. 60 Tablet 1   • ondansetron (ZOFRAN ODT) 4 MG TABLET DISPERSIBLE Take 1 Tablet by mouth every four hours as needed for Nausea. 30 Tablet 1     No current facility-administered medications for this visit.     He  has a past medical history of Bronchitis, Chickenpox, GERD (gastroesophageal reflux disease), IBS (irritable bowel syndrome), Indigestion, Influenza, Pneumonia, Pulmonary embolism (HCC), Renal disorder, and Sleep apnea.    Social History and Family History were reviewed and updated.    ROS   No chest pain, no shortness of breath, no abdominal pain and all other systems were reviewed and  "are negative.       Objective:     /70 (BP Location: Right arm, Patient Position: Sitting, BP Cuff Size: Adult)   Pulse 89   Temp 37.2 °C (99 °F) (Temporal)   Ht 1.727 m (5' 8\")   Wt 88.5 kg (195 lb)   SpO2 95%  Body mass index is 29.65 kg/m².   Physical Exam:  Constitutional: Alert, no distress.  Skin: Warm, dry, good turgor, no rashes in visible areas.  Eye: Equal, round and reactive, conjunctiva clear, lids normal.  ENMT: Lips without lesions, good dentition, oropharynx clear.  Neck: Trachea midline, no masses.   Lymph: No cervical or supraclavicular lymphadenopathy  Respiratory: Unlabored respiratory effort, lungs appear clear, no wheezes.  Cardiovascular: Regular rate and rhythm.  Psych: Alert and oriented x3, normal affect and mood.        Assessment and Plan:   The following treatment plan was discussed    1. Cyclical vomiting syndrome  Chronic condition.  Stable.  Renewed medications as directed.  Appears to be doing well without any recent severe symptoms.  - pantoprazole (PROTONIX) 40 MG Tablet Delayed Response; Take 1 Tablet by mouth 2 times a day.  Dispense: 180 Tablet; Refill: 2  - promethazine (PHENERGAN) 25 MG Tab; Take 1 Tablet by mouth every 6 hours as needed for Nausea/Vomiting.  Dispense: 60 Tablet; Refill: 1  - ondansetron (ZOFRAN ODT) 4 MG TABLET DISPERSIBLE; Take 1 Tablet by mouth every four hours as needed for Nausea.  Dispense: 30 Tablet; Refill: 1    2. Gastroesophageal reflux disease without esophagitis  Chronic condition.  Stable.  Renewed Protonix.  Refer to GI for upper endoscopy for surveillance of Randle's.  - pantoprazole (PROTONIX) 40 MG Tablet Delayed Response; Take 1 Tablet by mouth 2 times a day.  Dispense: 180 Tablet; Refill: 2    3. Randle's esophagus without dysplasia  Chronic condition.  Referred to [facility] for upper endoscopy.  Needs surveillance.  - Referral to Gastroenterology    4. Dyslipidemia  Chronic condition.  Status unknown.  Renewed Zocor and ordered " labs.  - simvastatin (ZOCOR) 20 MG Tab; Take 1 Tablet by mouth every evening.  Dispense: 90 Tablet; Refill: 2    5. Preventative health care  Ordered labs.  Fast 8 hours.  - Lipid Profile; Future  - CBC WITH DIFFERENTIAL; Future  - Comp Metabolic Panel; Future  - HEMOGLOBIN A1C; Future         Followup: Return in about 6 months (around 8/23/2022), or if symptoms worsen or fail to improve.    Please note that this dictation was created using voice recognition software. I have made every reasonable attempt to correct obvious errors, but I expect that there are errors of grammar and possibly content that I did not discover before finalizing the note.

## 2022-02-23 NOTE — ASSESSMENT & PLAN NOTE
This is a pleasant 46-year-old male who is here today to discuss refills of medication.  I have not seen him in over a year.  He has a history of cyclical vomiting syndrome but recently has done quite well.  Vomiting has not gotten to the point where he has had tovisit the ED.  He states that his medications for vomiting are likely .  He has both Phenergan and Zofran at home.  If Phenergan does not work then he will take Zofran.  He also needs a renewal of his PPI.  Does have a history of reflux and Randle's esophagus.  It appears that he is due for an upper endoscopy.

## 2022-03-14 NOTE — PROGRESS NOTES
[facility] Progress Note    Date of Service: 10/13/2018    Chief Complaint  43 y.o. male admitted 10/12/2018 with abdominal pain diverticulitis     Interval Problem Update  Pt seen ad examined, still with abdominal pain and also with nausea and vomiting, but slowly improving.  No acute events overnight, afebrile.     Consultants/Specialty  None     Disposition  Home when medically cleared         Review of Systems   Constitutional: Negative for chills and fever.   HENT: Negative for congestion and ear pain.    Respiratory: Negative for cough and shortness of breath.    Cardiovascular: Negative for chest pain and palpitations.   Gastrointestinal: Positive for abdominal pain, nausea and vomiting.   Genitourinary: Negative for dysuria and urgency.   Musculoskeletal: Negative for joint pain and myalgias.   Neurological: Negative for tingling and headaches.      Physical Exam  Laboratory/Imaging   Hemodynamics  Temp (24hrs), Av.8 °C (98.3 °F), Min:36.5 °C (97.7 °F), Max:37.1 °C (98.8 °F)   Temperature: 36.5 °C (97.7 °F)  Pulse  Av.1  Min: 85  Max: 102 Heart Rate (Monitored): 94  Blood Pressure: 128/78, NIBP: 136/87      Respiratory      Respiration: 18, Pulse Oximetry: 94 %        RUL Breath Sounds: Clear, RML Breath Sounds: Clear, RLL Breath Sounds: Diminished, JEFFERY Breath Sounds: Clear, LLL Breath Sounds: Diminished    Fluids    Intake/Output Summary (Last 24 hours) at 10/13/18 1338  Last data filed at 10/13/18 0900   Gross per 24 hour   Intake          2534.59 ml   Output              900 ml   Net          1634.59 ml       Nutrition  Orders Placed This Encounter   Procedures   • Diet Order Full Liquid (advance as tolerated to regular)     Standing Status:   Standing     Number of Occurrences:   1     Order Specific Question:   Diet:     Answer:   Full Liquid [11]     Comments:   advance as tolerated to regular     Physical Exam   Constitutional: He is oriented to person, place, and time.   HENT:    Head: Normocephalic and atraumatic.   Eyes: Conjunctivae are normal. No scleral icterus.   Neck: Neck supple. No JVD present.   Cardiovascular: Normal rate.  Exam reveals no gallop.    Pulmonary/Chest: He has no wheezes.   Abdominal: Soft. Bowel sounds are normal. He exhibits no distension. There is tenderness. There is no rebound and no guarding.   Musculoskeletal: He exhibits no edema.   Neurological: He is alert and oriented to person, place, and time.   Skin: Skin is warm and dry. No erythema.   Nursing note and vitals reviewed.      Recent Labs      10/12/18   1305  10/13/18   0023   WBC  15.4*  13.8*   RBC  5.55  4.43*   HEMOGLOBIN  16.2  13.1*   HEMATOCRIT  46.4  37.7*   MCV  83.6  85.1   MCH  29.2  29.6   MCHC  34.9  34.7   RDW  37.7  39.4   PLATELETCT  209  94*   MPV  10.6  10.2     Recent Labs      10/12/18   1305  10/13/18   0023   SODIUM  137  136   POTASSIUM  4.6  3.3*   CHLORIDE  104  108   CO2  21  23   GLUCOSE  131*  94   BUN  15  12   CREATININE  1.07  0.94   CALCIUM  10.6*  8.5                      Assessment/Plan     Acute diverticulitis- (present on admission)   Assessment & Plan    -Has had it for a while but is been unable to keep down his antibiotics, left lower quadrant pain persists-CT scan did not show anything obvious but at this point in time I think it best to restart his IV antibiotics and only give a clear liquid diet  -Depending on how long the patient will need to be hospitalized, he should be finished with antibiotics at the time of discharge  -monitor           Cyclical vomiting syndrome- (present on admission)   Assessment & Plan    -improving slowly   -cont on IVF and antiemetics   -Continue outpatient follow-up with GI        Dehydration   Assessment & Plan    -Due to intractable nausea and vomiting  -cont on IVF         Elevated lactic acid level   Assessment & Plan    -Likely more due to dehydration then diverticulitis  -trended down with IVF         Gastroesophageal reflux  disease with esophagitis- (present on admission)   Assessment & Plan    -Continue home meds        Leukocytosis- (present on admission)   Assessment & Plan    -Likely multifactorial in patient with diverticulitis as well as significant vomiting  -Cont on IV fluids as well as IV Rocephin and Flagyl  -repeat CBC in the morning          Quality-Core Measures   Reviewed items::  Labs reviewed, Radiology images reviewed and Medications reviewed  Li catheter::  No Li  Antibiotics:  Treating active infection/contamination beyond 24 hours perioperative coverage         Opioid Pregnancy And Lactation Text: These medications can lead to premature delivery and should be avoided during pregnancy. These medications are also present in breast milk in small amounts.

## 2022-03-15 ENCOUNTER — HOSPITAL ENCOUNTER (OUTPATIENT)
Dept: LAB | Facility: MEDICAL CENTER | Age: 47
End: 2022-03-15
Attending: PHYSICIAN ASSISTANT
Payer: COMMERCIAL

## 2022-03-15 DIAGNOSIS — Z00.00 PREVENTATIVE HEALTH CARE: ICD-10-CM

## 2022-03-15 LAB
ALBUMIN SERPL BCP-MCNC: 4.7 G/DL (ref 3.2–4.9)
ALBUMIN/GLOB SERPL: 2.4 G/DL
ALP SERPL-CCNC: 122 U/L (ref 30–99)
ALT SERPL-CCNC: 16 U/L (ref 2–50)
ANION GAP SERPL CALC-SCNC: 10 MMOL/L (ref 7–16)
AST SERPL-CCNC: 16 U/L (ref 12–45)
BASOPHILS # BLD AUTO: 0.7 % (ref 0–1.8)
BASOPHILS # BLD: 0.06 K/UL (ref 0–0.12)
BILIRUB SERPL-MCNC: 0.4 MG/DL (ref 0.1–1.5)
BUN SERPL-MCNC: 14 MG/DL (ref 8–22)
CALCIUM SERPL-MCNC: 9.4 MG/DL (ref 8.4–10.2)
CHLORIDE SERPL-SCNC: 107 MMOL/L (ref 96–112)
CHOLEST SERPL-MCNC: 145 MG/DL (ref 100–199)
CO2 SERPL-SCNC: 23 MMOL/L (ref 20–33)
COMMENT 1642: NORMAL
CREAT SERPL-MCNC: 0.84 MG/DL (ref 0.5–1.4)
EOSINOPHIL # BLD AUTO: 2.12 K/UL (ref 0–0.51)
EOSINOPHIL NFR BLD: 24.3 % (ref 0–6.9)
ERYTHROCYTE [DISTWIDTH] IN BLOOD BY AUTOMATED COUNT: 41.9 FL (ref 35.9–50)
EST. AVERAGE GLUCOSE BLD GHB EST-MCNC: 105 MG/DL
FASTING STATUS PATIENT QL REPORTED: NORMAL
GFR SERPLBLD CREATININE-BSD FMLA CKD-EPI: 108 ML/MIN/1.73 M 2
GLOBULIN SER CALC-MCNC: 2 G/DL (ref 1.9–3.5)
GLUCOSE SERPL-MCNC: 87 MG/DL (ref 65–99)
HBA1C MFR BLD: 5.3 % (ref 4–5.6)
HCT VFR BLD AUTO: 47 % (ref 42–52)
HDLC SERPL-MCNC: 43 MG/DL
HGB BLD-MCNC: 15.7 G/DL (ref 14–18)
IMM GRANULOCYTES # BLD AUTO: 0.03 K/UL (ref 0–0.11)
IMM GRANULOCYTES NFR BLD AUTO: 0.3 % (ref 0–0.9)
LDLC SERPL CALC-MCNC: 83 MG/DL
LYMPHOCYTES # BLD AUTO: 2.37 K/UL (ref 1–4.8)
LYMPHOCYTES NFR BLD: 27.2 % (ref 22–41)
MCH RBC QN AUTO: 29.4 PG (ref 27–33)
MCHC RBC AUTO-ENTMCNC: 33.4 G/DL (ref 33.7–35.3)
MCV RBC AUTO: 88 FL (ref 81.4–97.8)
MONOCYTES # BLD AUTO: 0.73 K/UL (ref 0–0.85)
MONOCYTES NFR BLD AUTO: 8.4 % (ref 0–13.4)
NEUTROPHILS # BLD AUTO: 3.4 K/UL (ref 1.82–7.42)
NEUTROPHILS NFR BLD: 39.1 % (ref 44–72)
NRBC # BLD AUTO: 0 K/UL
NRBC BLD-RTO: 0 /100 WBC
PLATELET # BLD AUTO: 122 K/UL (ref 164–446)
PLATELET BLD QL SMEAR: NORMAL
PMV BLD AUTO: 9.7 FL (ref 9–12.9)
POTASSIUM SERPL-SCNC: 5 MMOL/L (ref 3.6–5.5)
PROT SERPL-MCNC: 6.7 G/DL (ref 6–8.2)
RBC # BLD AUTO: 5.34 M/UL (ref 4.7–6.1)
RBC BLD AUTO: NORMAL
SODIUM SERPL-SCNC: 140 MMOL/L (ref 135–145)
TRIGL SERPL-MCNC: 94 MG/DL (ref 0–149)
WBC # BLD AUTO: 8.7 K/UL (ref 4.8–10.8)

## 2022-03-15 PROCEDURE — 80053 COMPREHEN METABOLIC PANEL: CPT

## 2022-03-15 PROCEDURE — 85025 COMPLETE CBC W/AUTO DIFF WBC: CPT

## 2022-03-15 PROCEDURE — 80061 LIPID PANEL: CPT

## 2022-03-15 PROCEDURE — 83036 HEMOGLOBIN GLYCOSYLATED A1C: CPT

## 2022-03-15 PROCEDURE — 36415 COLL VENOUS BLD VENIPUNCTURE: CPT

## 2022-12-17 DIAGNOSIS — K21.9 GASTROESOPHAGEAL REFLUX DISEASE WITHOUT ESOPHAGITIS: ICD-10-CM

## 2022-12-17 DIAGNOSIS — R11.15 CYCLICAL VOMITING SYNDROME: ICD-10-CM

## 2022-12-19 RX ORDER — PANTOPRAZOLE SODIUM 40 MG/1
TABLET, DELAYED RELEASE ORAL
Qty: 180 TABLET | Refills: 2 | Status: SHIPPED | OUTPATIENT
Start: 2022-12-19 | End: 2022-12-23 | Stop reason: SDUPTHER

## 2023-02-24 ENCOUNTER — APPOINTMENT (OUTPATIENT)
Dept: MEDICAL GROUP | Facility: MEDICAL CENTER | Age: 48
End: 2023-02-24
Payer: COMMERCIAL

## 2023-02-28 ENCOUNTER — APPOINTMENT (OUTPATIENT)
Dept: MEDICAL GROUP | Facility: MEDICAL CENTER | Age: 48
End: 2023-02-28
Payer: COMMERCIAL

## 2023-07-04 DIAGNOSIS — E78.5 DYSLIPIDEMIA: ICD-10-CM

## 2023-07-05 RX ORDER — SIMVASTATIN 20 MG
20 TABLET ORAL EVERY EVENING
Qty: 90 TABLET | Refills: 1 | Status: SHIPPED | OUTPATIENT
Start: 2023-07-05 | End: 2023-09-11 | Stop reason: SDUPTHER

## 2023-09-11 ENCOUNTER — OFFICE VISIT (OUTPATIENT)
Dept: MEDICAL GROUP | Facility: MEDICAL CENTER | Age: 48
End: 2023-09-11
Payer: COMMERCIAL

## 2023-09-11 VITALS
HEIGHT: 68 IN | WEIGHT: 199 LBS | OXYGEN SATURATION: 92 % | SYSTOLIC BLOOD PRESSURE: 120 MMHG | HEART RATE: 95 BPM | BODY MASS INDEX: 30.16 KG/M2 | TEMPERATURE: 97.2 F | DIASTOLIC BLOOD PRESSURE: 60 MMHG

## 2023-09-11 DIAGNOSIS — K22.70 BARRETT'S ESOPHAGUS WITHOUT DYSPLASIA: ICD-10-CM

## 2023-09-11 DIAGNOSIS — Z12.5 SCREENING PSA (PROSTATE SPECIFIC ANTIGEN): ICD-10-CM

## 2023-09-11 DIAGNOSIS — E78.5 DYSLIPIDEMIA: ICD-10-CM

## 2023-09-11 DIAGNOSIS — M79.642 PAIN IN BOTH HANDS: ICD-10-CM

## 2023-09-11 DIAGNOSIS — Z00.00 ANNUAL PHYSICAL EXAM: ICD-10-CM

## 2023-09-11 DIAGNOSIS — Z23 NEED FOR IMMUNIZATION AGAINST INFLUENZA: ICD-10-CM

## 2023-09-11 DIAGNOSIS — R11.15 CYCLICAL VOMITING SYNDROME: ICD-10-CM

## 2023-09-11 DIAGNOSIS — K21.9 GASTROESOPHAGEAL REFLUX DISEASE WITHOUT ESOPHAGITIS: ICD-10-CM

## 2023-09-11 DIAGNOSIS — M79.641 PAIN IN BOTH HANDS: ICD-10-CM

## 2023-09-11 DIAGNOSIS — Z11.59 ENCOUNTER FOR HEPATITIS C SCREENING TEST FOR LOW RISK PATIENT: ICD-10-CM

## 2023-09-11 DIAGNOSIS — Z12.11 COLON CANCER SCREENING: ICD-10-CM

## 2023-09-11 PROBLEM — F12.20 TETRAHYDROCANNABINOL (THC) USE DISORDER, SEVERE, DEPENDENCE (HCC): Status: RESOLVED | Noted: 2020-01-07 | Resolved: 2023-09-11

## 2023-09-11 PROCEDURE — 90471 IMMUNIZATION ADMIN: CPT | Performed by: PHYSICIAN ASSISTANT

## 2023-09-11 PROCEDURE — 3078F DIAST BP <80 MM HG: CPT | Performed by: PHYSICIAN ASSISTANT

## 2023-09-11 PROCEDURE — 90686 IIV4 VACC NO PRSV 0.5 ML IM: CPT | Performed by: PHYSICIAN ASSISTANT

## 2023-09-11 PROCEDURE — 99214 OFFICE O/P EST MOD 30 MIN: CPT | Mod: 25 | Performed by: PHYSICIAN ASSISTANT

## 2023-09-11 PROCEDURE — 3074F SYST BP LT 130 MM HG: CPT | Performed by: PHYSICIAN ASSISTANT

## 2023-09-11 RX ORDER — PANTOPRAZOLE SODIUM 40 MG/1
40 TABLET, DELAYED RELEASE ORAL 2 TIMES DAILY
Qty: 180 TABLET | Refills: 1 | Status: SHIPPED | OUTPATIENT
Start: 2023-09-11

## 2023-09-11 RX ORDER — ONDANSETRON 4 MG/1
4 TABLET, ORALLY DISINTEGRATING ORAL EVERY 4 HOURS PRN
Qty: 30 TABLET | Refills: 3 | Status: SHIPPED | OUTPATIENT
Start: 2023-09-11

## 2023-09-11 RX ORDER — SIMVASTATIN 20 MG
20 TABLET ORAL EVERY EVENING
Qty: 90 TABLET | Refills: 1 | Status: SHIPPED | OUTPATIENT
Start: 2023-09-11

## 2023-09-11 RX ORDER — PROMETHAZINE HYDROCHLORIDE 25 MG/1
25 TABLET ORAL EVERY 6 HOURS PRN
Qty: 60 TABLET | Refills: 3 | Status: SHIPPED | OUTPATIENT
Start: 2023-09-11 | End: 2023-10-11

## 2023-09-11 ASSESSMENT — FIBROSIS 4 INDEX: FIB4 SCORE: 1.606557377049180328

## 2023-09-11 ASSESSMENT — PATIENT HEALTH QUESTIONNAIRE - PHQ9: CLINICAL INTERPRETATION OF PHQ2 SCORE: 0

## 2023-09-11 NOTE — PROGRESS NOTES
Subjective:   Jerome Cardoza Jr. is a 48 y.o. male here today for annual physical plus pain bilateral hands chronically.    Annual physical exam  This is a pleasant 48-year-old male here today annual physical.  He is overdue for labs.  Does need renewals of his medications.  Takes simvastatin for elevated cholesterol.  For renewal.  Also takes pantoprazole 40 mg twice a day.  Has a chronic history of GERD.  Symptoms are stable on the medication.  Also has been diagnosed in the past with Randle's esophagus.  Does not have dysphagia but is overdue for a surveillance upper endoscopy.  He is due for a colonoscopy for colon cancer screening.  Also has issues at times with cyclical vomiting syndrome.  Does take Phenergan and Zofran as needed.  Is due for renewal for that.    Pain in both hands  He has been diagnosed in the past with osteoarthritis.  Has a partial knee replacement on the left knee.  In the future will need a total knee replacement.  Also has pain of the right knee.  Also has shoulder pain bilaterally.  Lastly complains of bilateral hand pain.  Does use his hands for work.  Has not had an x-ray of his hands before to confirm OA.  Will take ibuprofen at times for pain.       Current medicines (including changes today)  Current Outpatient Medications   Medication Sig Dispense Refill    pantoprazole (PROTONIX) 40 MG Tablet Delayed Response Take 1 Tablet by mouth 2 times a day. 180 Tablet 1    simvastatin (ZOCOR) 20 MG Tab Take 1 Tablet by mouth every evening. 90 Tablet 1    promethazine (PHENERGAN) 25 MG Tab Take 1 Tablet by mouth every 6 hours as needed for Nausea/Vomiting for up to 30 days. 60 Tablet 3    ondansetron (ZOFRAN ODT) 4 MG TABLET DISPERSIBLE Take 1 Tablet by mouth every four hours as needed for Nausea/Vomiting. 30 Tablet 3     No current facility-administered medications for this visit.     He  has a past medical history of Bronchitis, Chickenpox, GERD (gastroesophageal reflux  "disease), IBS (irritable bowel syndrome), Indigestion, Influenza, Pneumonia, Pulmonary embolism (HCC), Renal disorder, and Sleep apnea.    Social History and Family History were reviewed and updated.    ROS   No chest pain, no shortness of breath, no abdominal pain and all other systems were reviewed and are negative.       Objective:     /60 (BP Location: Right arm, Patient Position: Sitting, BP Cuff Size: Adult)   Pulse 95   Temp 36.2 °C (97.2 °F) (Temporal)   Ht 1.727 m (5' 8\")   Wt 90.3 kg (199 lb)   SpO2 92%  Body mass index is 30.26 kg/m².   Physical Exam:  Constitutional: Alert, no distress.  Skin: Warm, dry, good turgor, no rashes in visible areas.  Eye: Equal, round and reactive, conjunctiva clear, lids normal.  ENMT: Lips without lesions, good dentition, oropharynx clear.  Neck: Trachea midline, no masses.   Respiratory: Unlabored respiratory effort.  Musculoskeletal: Bilateral hands appear symmetrical.  No deformities noted.  Psych: Alert and oriented x3, normal affect and mood.        Assessment and Plan:   The following treatment plan was discussed    1. Annual physical exam  Ordered labs.  Fast 8 hours.  He will be contacted with the results.  - CBC WITH DIFFERENTIAL; Future  - Comp Metabolic Panel; Future  - Lipid Profile; Future  - TSH WITH REFLEX TO FT4; Future  - HEMOGLOBIN A1C; Future    2. Gastroesophageal reflux disease without esophagitis  Chronic condition.  Stable.  Renewed Protonix.  Refer to GI for upper endoscopy.  - Referral to Gastroenterology  - pantoprazole (PROTONIX) 40 MG Tablet Delayed Response; Take 1 Tablet by mouth 2 times a day.  Dispense: 180 Tablet; Refill: 1    3. Randle's esophagus without dysplasia  Chronic condition.  Time for surveillance endoscopy.  Referred to GI see for consultation.  Continue Protonix.  - Referral to Gastroenterology    4. Dyslipidemia  Chronic condition.  Status unknown.  Renewed simvastatin.  Check cholesterol profile.  - simvastatin " (ZOCOR) 20 MG Tab; Take 1 Tablet by mouth every evening.  Dispense: 90 Tablet; Refill: 1    5. Pain in both hands  New condition noted in chart but chronic.  Likely OA but will check with a single view x-ray of bilateral hands.  Discussed taking over-the-counter Aleve as directed.  May continue ibuprofen.  - DX-JOINT SURVEY-HANDS SINGLE VIEW; Future    6. Cyclical vomiting syndrome  Chronic condition.  Stable.  In the past thought to be secondary to actual THC use.  Did renew promethazine and ondansetron to take as directed.  - pantoprazole (PROTONIX) 40 MG Tablet Delayed Response; Take 1 Tablet by mouth 2 times a day.  Dispense: 180 Tablet; Refill: 1  - promethazine (PHENERGAN) 25 MG Tab; Take 1 Tablet by mouth every 6 hours as needed for Nausea/Vomiting for up to 30 days.  Dispense: 60 Tablet; Refill: 3  - ondansetron (ZOFRAN ODT) 4 MG TABLET DISPERSIBLE; Take 1 Tablet by mouth every four hours as needed for Nausea/Vomiting.  Dispense: 30 Tablet; Refill: 3    7. Screening PSA (prostate specific antigen)  PSA ordered.  Screening.  - PROSTATE SPECIFIC AG SCREENING; Future    8. Colon cancer screening  Referred to [facility] for colon cancer screening.  - Referral to Gastroenterology    9. Encounter for hepatitis C screening test for low risk patient  Hep C viral antibody ordered.  Low risk.  - HEP C VIRUS ANTIBODY; Future    10. Need for immunization against influenza  Administer without complaints.  - INFLUENZA VACCINE QUAD INJ (PF)         Followup: Return in about 6 months (around 3/11/2024), or if symptoms worsen or fail to improve.    Please note that this dictation was created using voice recognition software. I have made every reasonable attempt to correct obvious errors, but I expect that there are errors of grammar and possibly content that I did not discover before finalizing the note.

## 2023-09-11 NOTE — ASSESSMENT & PLAN NOTE
He has been diagnosed in the past with osteoarthritis.  Has a partial knee replacement on the left knee.  In the future will need a total knee replacement.  Also has pain of the right knee.  Also has shoulder pain bilaterally.  Lastly complains of bilateral hand pain.  Does use his hands for work.  Has not had an x-ray of his hands before to confirm OA.  Will take ibuprofen at times for pain.

## 2023-09-11 NOTE — ASSESSMENT & PLAN NOTE
This is a pleasant 48-year-old male here today annual physical.  He is overdue for labs.  Does need renewals of his medications.  Takes simvastatin for elevated cholesterol.  For renewal.  Also takes pantoprazole 40 mg twice a day.  Has a chronic history of GERD.  Symptoms are stable on the medication.  Also has been diagnosed in the past with Randle's esophagus.  Does not have dysphagia but is overdue for a surveillance upper endoscopy.  He is due for a colonoscopy for colon cancer screening.  Also has issues at times with cyclical vomiting syndrome.  Does take Phenergan and Zofran as needed.  Is due for renewal for that.

## 2023-09-18 ENCOUNTER — HOSPITAL ENCOUNTER (OUTPATIENT)
Dept: RADIOLOGY | Facility: MEDICAL CENTER | Age: 48
End: 2023-09-18
Attending: PHYSICIAN ASSISTANT
Payer: COMMERCIAL

## 2023-09-18 ENCOUNTER — HOSPITAL ENCOUNTER (OUTPATIENT)
Dept: LAB | Facility: MEDICAL CENTER | Age: 48
End: 2023-09-18
Attending: PHYSICIAN ASSISTANT
Payer: COMMERCIAL

## 2023-09-18 DIAGNOSIS — M79.642 PAIN IN BOTH HANDS: ICD-10-CM

## 2023-09-18 DIAGNOSIS — Z11.59 ENCOUNTER FOR HEPATITIS C SCREENING TEST FOR LOW RISK PATIENT: ICD-10-CM

## 2023-09-18 DIAGNOSIS — M79.641 PAIN IN BOTH HANDS: ICD-10-CM

## 2023-09-18 DIAGNOSIS — Z00.00 ANNUAL PHYSICAL EXAM: ICD-10-CM

## 2023-09-18 DIAGNOSIS — Z12.5 SCREENING PSA (PROSTATE SPECIFIC ANTIGEN): ICD-10-CM

## 2023-09-18 LAB
ALBUMIN SERPL BCP-MCNC: 4.6 G/DL (ref 3.2–4.9)
ALBUMIN/GLOB SERPL: 2 G/DL
ALP SERPL-CCNC: 80 U/L (ref 30–99)
ALT SERPL-CCNC: 20 U/L (ref 2–50)
ANION GAP SERPL CALC-SCNC: 13 MMOL/L (ref 7–16)
AST SERPL-CCNC: 17 U/L (ref 12–45)
BASOPHILS # BLD AUTO: 0.3 % (ref 0–1.8)
BASOPHILS # BLD: 0.03 K/UL (ref 0–0.12)
BILIRUB SERPL-MCNC: 0.3 MG/DL (ref 0.1–1.5)
BUN SERPL-MCNC: 12 MG/DL (ref 8–22)
CALCIUM ALBUM COR SERPL-MCNC: 8.8 MG/DL (ref 8.5–10.5)
CALCIUM SERPL-MCNC: 9.3 MG/DL (ref 8.4–10.2)
CHLORIDE SERPL-SCNC: 106 MMOL/L (ref 96–112)
CHOLEST SERPL-MCNC: 152 MG/DL (ref 100–199)
CO2 SERPL-SCNC: 21 MMOL/L (ref 20–33)
CREAT SERPL-MCNC: 0.8 MG/DL (ref 0.5–1.4)
EOSINOPHIL # BLD AUTO: 0.21 K/UL (ref 0–0.51)
EOSINOPHIL NFR BLD: 2.2 % (ref 0–6.9)
ERYTHROCYTE [DISTWIDTH] IN BLOOD BY AUTOMATED COUNT: 40.8 FL (ref 35.9–50)
EST. AVERAGE GLUCOSE BLD GHB EST-MCNC: 108 MG/DL
FASTING STATUS PATIENT QL REPORTED: NORMAL
GFR SERPLBLD CREATININE-BSD FMLA CKD-EPI: 108 ML/MIN/1.73 M 2
GLOBULIN SER CALC-MCNC: 2.3 G/DL (ref 1.9–3.5)
GLUCOSE SERPL-MCNC: 97 MG/DL (ref 65–99)
HBA1C MFR BLD: 5.4 % (ref 4–5.6)
HCT VFR BLD AUTO: 47.1 % (ref 42–52)
HCV AB SER QL: NORMAL
HDLC SERPL-MCNC: 43 MG/DL
HGB BLD-MCNC: 16.1 G/DL (ref 14–18)
IMM GRANULOCYTES # BLD AUTO: 0.04 K/UL (ref 0–0.11)
IMM GRANULOCYTES NFR BLD AUTO: 0.4 % (ref 0–0.9)
LDLC SERPL CALC-MCNC: 73 MG/DL
LYMPHOCYTES # BLD AUTO: 2.88 K/UL (ref 1–4.8)
LYMPHOCYTES NFR BLD: 30 % (ref 22–41)
MCH RBC QN AUTO: 29.9 PG (ref 27–33)
MCHC RBC AUTO-ENTMCNC: 34.2 G/DL (ref 32.3–36.5)
MCV RBC AUTO: 87.4 FL (ref 81.4–97.8)
MONOCYTES # BLD AUTO: 0.52 K/UL (ref 0–0.85)
MONOCYTES NFR BLD AUTO: 5.4 % (ref 0–13.4)
NEUTROPHILS # BLD AUTO: 5.93 K/UL (ref 1.82–7.42)
NEUTROPHILS NFR BLD: 61.7 % (ref 44–72)
NRBC # BLD AUTO: 0 K/UL
NRBC BLD-RTO: 0 /100 WBC (ref 0–0.2)
PLATELET # BLD AUTO: NORMAL K/UL (ref 164–446)
PLATELET BLD QL SMEAR: NORMAL
PLATELETS.RETICULATED NFR BLD AUTO: 20 % (ref 0.6–13.1)
PMV BLD AUTO: 10.4 FL (ref 9–12.9)
POTASSIUM SERPL-SCNC: 4.6 MMOL/L (ref 3.6–5.5)
PROT SERPL-MCNC: 6.9 G/DL (ref 6–8.2)
PSA SERPL-MCNC: 0.74 NG/ML (ref 0–4)
RBC # BLD AUTO: 5.39 M/UL (ref 4.7–6.1)
RBC BLD AUTO: NORMAL
SODIUM SERPL-SCNC: 140 MMOL/L (ref 135–145)
TRIGL SERPL-MCNC: 180 MG/DL (ref 0–149)
TSH SERPL DL<=0.005 MIU/L-ACNC: 1.03 UIU/ML (ref 0.38–5.33)
WBC # BLD AUTO: 9.6 K/UL (ref 4.8–10.8)

## 2023-09-18 PROCEDURE — 85055 RETICULATED PLATELET ASSAY: CPT

## 2023-09-18 PROCEDURE — 86803 HEPATITIS C AB TEST: CPT

## 2023-09-18 PROCEDURE — 83036 HEMOGLOBIN GLYCOSYLATED A1C: CPT

## 2023-09-18 PROCEDURE — 80053 COMPREHEN METABOLIC PANEL: CPT

## 2023-09-18 PROCEDURE — 77077 JOINT SURVEY SINGLE VIEW: CPT

## 2023-09-18 PROCEDURE — 85025 COMPLETE CBC W/AUTO DIFF WBC: CPT

## 2023-09-18 PROCEDURE — 84153 ASSAY OF PSA TOTAL: CPT

## 2023-09-18 PROCEDURE — 36415 COLL VENOUS BLD VENIPUNCTURE: CPT

## 2023-09-18 PROCEDURE — 80061 LIPID PANEL: CPT

## 2023-09-18 PROCEDURE — 84443 ASSAY THYROID STIM HORMONE: CPT

## 2024-01-23 ENCOUNTER — TELEPHONE (OUTPATIENT)
Dept: MEDICAL GROUP | Facility: MEDICAL CENTER | Age: 49
End: 2024-01-23
Payer: COMMERCIAL

## 2024-01-24 NOTE — TELEPHONE ENCOUNTER
DOCUMENTATION OF PAR STATUS:    1. Name of Medication & Dose: Pantoprazole Sodium 40MG dr tablets     2. Name of Prescription Coverage Company & phone #: [email or web address]    3. Date Prior Auth Submitted: 1/23/24    4. What information was given to obtain insurance decision? Clinical office notes    5. Prior Auth Status? Pending    6. Patient Notified: N\A

## 2024-04-04 DIAGNOSIS — R11.15 CYCLICAL VOMITING SYNDROME: ICD-10-CM

## 2024-04-04 DIAGNOSIS — K21.9 GASTROESOPHAGEAL REFLUX DISEASE WITHOUT ESOPHAGITIS: ICD-10-CM

## 2024-04-04 NOTE — TELEPHONE ENCOUNTER
Received request via: Pharmacy    Was the patient seen in the last year in this department? Yes    Does the patient have an active prescription (recently filled or refills available) for medication(s) requested? No    [facility]    Does the patient have detention Plus and need 100 day supply (blood pressure, diabetes and cholesterol meds only)? Patient does not have SCP

## 2024-04-05 RX ORDER — PANTOPRAZOLE SODIUM 40 MG/1
40 TABLET, DELAYED RELEASE ORAL 2 TIMES DAILY
Qty: 180 TABLET | Refills: 0 | Status: SHIPPED | OUTPATIENT
Start: 2024-04-05 | End: 2024-07-04

## 2024-04-27 DIAGNOSIS — E78.5 DYSLIPIDEMIA: ICD-10-CM

## 2024-04-29 RX ORDER — SIMVASTATIN 20 MG
20 TABLET ORAL EVERY EVENING
Qty: 90 TABLET | Refills: 0 | Status: SHIPPED | OUTPATIENT
Start: 2024-04-29

## 2024-05-29 DIAGNOSIS — K21.9 GASTROESOPHAGEAL REFLUX DISEASE WITHOUT ESOPHAGITIS: ICD-10-CM

## 2024-05-29 DIAGNOSIS — R11.15 CYCLICAL VOMITING SYNDROME: ICD-10-CM

## 2024-05-29 RX ORDER — PANTOPRAZOLE SODIUM 40 MG/1
40 TABLET, DELAYED RELEASE ORAL 2 TIMES DAILY
Qty: 180 TABLET | Refills: 0 | Status: SHIPPED | OUTPATIENT
Start: 2024-05-29

## 2024-05-29 NOTE — TELEPHONE ENCOUNTER
Received request via: Pharmacy    Was the patient seen in the last year in this department? Yes    Does the patient have an active prescription (recently filled or refills available) for medication(s) requested? No    [facility]    Does the patient have FDC Plus and need 100 day supply (blood pressure, diabetes and cholesterol meds only)? Patient does not have SCP

## 2024-07-28 DIAGNOSIS — E78.5 DYSLIPIDEMIA: ICD-10-CM

## 2024-07-29 RX ORDER — SIMVASTATIN 20 MG
20 TABLET ORAL EVERY EVENING
Qty: 90 TABLET | Refills: 0 | Status: SHIPPED | OUTPATIENT
Start: 2024-07-29

## 2024-08-26 ENCOUNTER — PATIENT MESSAGE (OUTPATIENT)
Dept: MEDICAL GROUP | Facility: MEDICAL CENTER | Age: 49
End: 2024-08-26
Payer: COMMERCIAL

## 2024-08-26 DIAGNOSIS — R11.15 CYCLICAL VOMITING SYNDROME: ICD-10-CM

## 2024-08-26 RX ORDER — ONDANSETRON 4 MG/1
4 TABLET, ORALLY DISINTEGRATING ORAL EVERY 4 HOURS PRN
Qty: 30 TABLET | Refills: 0 | Status: SHIPPED | OUTPATIENT
Start: 2024-08-26

## 2024-10-26 DIAGNOSIS — K21.9 GASTROESOPHAGEAL REFLUX DISEASE WITHOUT ESOPHAGITIS: ICD-10-CM

## 2024-10-26 DIAGNOSIS — R11.15 CYCLICAL VOMITING SYNDROME: ICD-10-CM

## 2024-10-27 RX ORDER — PANTOPRAZOLE SODIUM 40 MG/1
40 TABLET, DELAYED RELEASE ORAL 2 TIMES DAILY
Qty: 180 TABLET | Refills: 0 | Status: SHIPPED | OUTPATIENT
Start: 2024-10-27

## 2024-10-29 DIAGNOSIS — E78.5 DYSLIPIDEMIA: ICD-10-CM

## 2024-10-29 RX ORDER — SIMVASTATIN 20 MG
20 TABLET ORAL EVERY EVENING
Qty: 90 TABLET | Refills: 0 | Status: SHIPPED | OUTPATIENT
Start: 2024-10-29

## 2024-11-11 ENCOUNTER — APPOINTMENT (OUTPATIENT)
Dept: MEDICAL GROUP | Facility: MEDICAL CENTER | Age: 49
End: 2024-11-11
Payer: COMMERCIAL

## 2024-11-20 ENCOUNTER — OFFICE VISIT (OUTPATIENT)
Dept: MEDICAL GROUP | Facility: MEDICAL CENTER | Age: 49
End: 2024-11-20
Payer: COMMERCIAL

## 2024-11-20 VITALS
DIASTOLIC BLOOD PRESSURE: 60 MMHG | HEART RATE: 75 BPM | HEIGHT: 68 IN | SYSTOLIC BLOOD PRESSURE: 110 MMHG | BODY MASS INDEX: 29.31 KG/M2 | WEIGHT: 193.4 LBS | TEMPERATURE: 97.8 F | OXYGEN SATURATION: 96 %

## 2024-11-20 DIAGNOSIS — K21.9 GASTROESOPHAGEAL REFLUX DISEASE WITHOUT ESOPHAGITIS: ICD-10-CM

## 2024-11-20 DIAGNOSIS — L72.3 SEBACEOUS CYST: ICD-10-CM

## 2024-11-20 DIAGNOSIS — Z00.00 PREVENTATIVE HEALTH CARE: ICD-10-CM

## 2024-11-20 DIAGNOSIS — Z12.11 COLON CANCER SCREENING: ICD-10-CM

## 2024-11-20 DIAGNOSIS — R11.15 CYCLICAL VOMITING SYNDROME: ICD-10-CM

## 2024-11-20 DIAGNOSIS — K22.70 BARRETT'S ESOPHAGUS WITHOUT DYSPLASIA: ICD-10-CM

## 2024-11-20 DIAGNOSIS — Z12.5 ENCOUNTER FOR SCREENING PROSTATE SPECIFIC ANTIGEN (PSA) MEASUREMENT: ICD-10-CM

## 2024-11-20 PROCEDURE — 3074F SYST BP LT 130 MM HG: CPT | Performed by: PHYSICIAN ASSISTANT

## 2024-11-20 PROCEDURE — 3078F DIAST BP <80 MM HG: CPT | Performed by: PHYSICIAN ASSISTANT

## 2024-11-20 PROCEDURE — 99214 OFFICE O/P EST MOD 30 MIN: CPT | Performed by: PHYSICIAN ASSISTANT

## 2024-11-20 RX ORDER — PROMETHAZINE HYDROCHLORIDE 25 MG/1
25 SUPPOSITORY RECTAL EVERY 6 HOURS PRN
Qty: 12 SUPPOSITORY | Refills: 5 | Status: SHIPPED | OUTPATIENT
Start: 2024-11-20 | End: 2024-12-20

## 2024-11-20 RX ORDER — LORAZEPAM 1 MG/1
1 TABLET ORAL EVERY 8 HOURS PRN
Qty: 10 TABLET | Refills: 0 | Status: SHIPPED | OUTPATIENT
Start: 2024-11-20 | End: 2024-11-30

## 2024-11-20 RX ORDER — ONDANSETRON 4 MG/1
4 TABLET, ORALLY DISINTEGRATING ORAL EVERY 4 HOURS PRN
Qty: 30 TABLET | Refills: 11 | Status: SHIPPED | OUTPATIENT
Start: 2024-11-20 | End: 2024-12-20

## 2024-11-20 RX ORDER — PANTOPRAZOLE SODIUM 40 MG/1
40 TABLET, DELAYED RELEASE ORAL 2 TIMES DAILY
Qty: 180 TABLET | Refills: 3 | Status: SHIPPED | OUTPATIENT
Start: 2024-11-20

## 2024-11-20 ASSESSMENT — FIBROSIS 4 INDEX: FIB4 SCORE: 1.14

## 2024-11-20 ASSESSMENT — PATIENT HEALTH QUESTIONNAIRE - PHQ9: CLINICAL INTERPRETATION OF PHQ2 SCORE: 0

## 2024-11-20 NOTE — PROGRESS NOTES
Subjective:     History of Present Illness  The patient presents for a follow-up visit, accompanied by his wife.    He reports an increase in weight, attributing it to muscle gain from his recent work activities. He has not undergone colon cancer screening and expresses a need for an upper endoscopy due to reflux. He was previously diagnosed with Randle's esophagus by Dr. Perea at [facility].    He requests a refill of ondansetron, having had three episodes of vomiting in August. He also requests Zofran dissolvable tablets and promethazine suppositories, which he uses during bouts of illness. He mentions that Ativan, administered in the ER, helps calm his stomach during these bouts and requests a prescription for it. He typically takes one Ativan daily during these episodes. He continues to take propranolol and pantoprazole.    He reports no use of illicit drugs but admits to occasional marijuana use.    He has a long-standing cyst on his shoulder, which he frequently squeezes, causing it to emit an unpleasant odor.    He experienced chickenpox in his youth.      Current medicines (including changes today)  Current Outpatient Medications   Medication Sig Dispense Refill    ondansetron (ZOFRAN ODT) 4 MG TABLET DISPERSIBLE Take 1 Tablet by mouth every four hours as needed for Nausea/Vomiting for up to 30 days. 30 Tablet 11    pantoprazole (PROTONIX) 40 MG Tablet Delayed Response Take 1 Tablet by mouth 2 times a day. 180 Tablet 3    promethazine (PHENERGAN) 25 MG Suppos Insert 1 Suppository into the rectum every 6 hours as needed for Nausea/Vomiting for up to 30 days. 12 Suppository 5    LORazepam (ATIVAN) 1 MG Tab Take 1 Tablet by mouth every 8 hours as needed (Cyclical vomiting syndrome) for up to 10 days. 10 Tablet 0    simvastatin (ZOCOR) 20 MG Tab Take 1 Tablet by mouth every evening. 90 Tablet 0     No current facility-administered medications for this visit.     He  has a past medical history  "of Bronchitis, Chickenpox, GERD (gastroesophageal reflux disease), IBS (irritable bowel syndrome), Indigestion, Influenza, Pneumonia, Pulmonary embolism (HCC), Renal disorder, and Sleep apnea.    ROS   No chest pain, no shortness of breath, no abdominal pain  Positive ROS as per HPI.  All other systems reviewed and are negative.     Objective:     /60 (BP Location: Left arm, Patient Position: Sitting, BP Cuff Size: Adult)   Pulse 75   Temp 36.6 °C (97.8 °F) (Temporal)   Ht 1.727 m (5' 8\")   Wt 87.7 kg (193 lb 6.4 oz)   SpO2 96%  Body mass index is 29.41 kg/m².   Physical Exam    Constitutional: Alert, no distress.  Skin: Warm, dry, good turgor, no rashes in visible areas.  Eye: Equal, round and reactive, conjunctiva clear, lids normal.  ENMT: Lips without lesions, good dentition, oropharynx clear.  Neck: Trachea midline, no masses, no thyromegaly.   Psych: Alert and oriented x3, normal affect and mood.      Results          Assessment and Plan:   The following treatment plan was discussed    Assessment & Plan  1. Gastroesophageal Reflux Disease (GERD).  He reports a history of Randle's esophagus and ongoing reflux symptoms. He will be referred to [facility] for an upper endoscopy to evaluate his condition further.    2. Colon Cancer Screening.  He is due for colon cancer screening. A referral to [facility] will be made for this purpose.    3. Cyclical Vomiting.  He reports having three bouts of vomiting in the past month. A prescription for Zofran (30 tablets with 11 refills) will be sent to his pharmacy. Additionally, a prescription for promethazine suppositories 25 mg (12 with 5 refills) will be provided. He also requested Ativan, which he finds helpful during vomiting episodes. A prescription for Ativan 1 mg (10 tablets) will be provided, with instructions to halve the dose initially and take it every 8 hours as needed.    4. Sebaceous Cyst.  He has a sebaceous cyst on his shoulder that has " been present for years. He was advised that if he wishes to have it removed, a referral to a dermatologist can be made.    5. Health Maintenance.  He was advised to receive the shingles vaccine at his local pharmacy. Information about the Shingrix vaccine, its effectiveness, and potential side effects were discussed. He was advised to schedule the vaccine on a day when he has no other plans and to have ibuprofen on hand to manage potential side effects. Lab orders for cholesterol, A1c, complete blood count, liver and kidney function, and PSA will be placed. He was instructed to fast for 8 hours before the lab tests.    Follow-up  Return in 6 months for follow up.      ORDERS:  1. Gastroesophageal reflux disease without esophagitis    - Referral to Gastroenterology  - pantoprazole (PROTONIX) 40 MG Tablet Delayed Response; Take 1 Tablet by mouth 2 times a day.  Dispense: 180 Tablet; Refill: 3    2. Randle's esophagus without dysplasia    - Referral to Gastroenterology    3. Cyclical vomiting syndrome    - ondansetron (ZOFRAN ODT) 4 MG TABLET DISPERSIBLE; Take 1 Tablet by mouth every four hours as needed for Nausea/Vomiting for up to 30 days.  Dispense: 30 Tablet; Refill: 11  - pantoprazole (PROTONIX) 40 MG Tablet Delayed Response; Take 1 Tablet by mouth 2 times a day.  Dispense: 180 Tablet; Refill: 3  - promethazine (PHENERGAN) 25 MG Suppos; Insert 1 Suppository into the rectum every 6 hours as needed for Nausea/Vomiting for up to 30 days.  Dispense: 12 Suppository; Refill: 5  - LORazepam (ATIVAN) 1 MG Tab; Take 1 Tablet by mouth every 8 hours as needed (Cyclical vomiting syndrome) for up to 10 days.  Dispense: 10 Tablet; Refill: 0    4. Sebaceous cyst      5. Colon cancer screening    - Referral to Gastroenterology    6. Preventative health care    - Lipid Profile; Future  - HEMOGLOBIN A1C; Future  - CBC WITHOUT DIFFERENTIAL; Future    7. Encounter for screening prostate specific antigen (PSA) measurement    -  PROSTATE SPECIFIC AG SCREENING; Future        Please note that this dictation was created using voice recognition software. I have made every reasonable attempt to correct obvious errors, but I expect that there are errors of grammar and possibly content that I did not discover before finalizing the note.      Attestation      Verbal consent was acquired by the patient to use [employer] ambient listening note generation during this visit Yes

## 2025-02-07 DIAGNOSIS — E78.5 DYSLIPIDEMIA: ICD-10-CM

## 2025-02-07 RX ORDER — SIMVASTATIN 20 MG
20 TABLET ORAL EVERY EVENING
Qty: 90 TABLET | Refills: 2 | Status: SHIPPED | OUTPATIENT
Start: 2025-02-07

## 2025-02-07 NOTE — TELEPHONE ENCOUNTER
Received request via: Pharmacy    Was the patient seen in the last year in this department? Yes    Does the patient have an active prescription (recently filled or refills available) for medication(s) requested? No    [facility]    Does the patient have residential Plus and need 100-day supply? (This applies to ALL medications) Patient does not have SCP

## 2025-02-26 NOTE — PROGRESS NOTES
Subjective:   Jerome Cardoza is a 42 y.o. male here today for nausea and vomiting for 2 weeks.    Non-intractable vomiting with nausea  This is a 42-year-old male who complains of a two-week history of nauseousness and vomiting. States that symptoms began when he could not swallow food. Food gets stuck down in his esophagus. He states that in the past he has had dilatation of a stricture. Reflux symptoms are controlled. He is taking Protonix and added Zantac which is helping anymore. Has an appointment to see his doctor at [facility] but appointment isn't until April. Symptoms occurred in [place] while he was visiting his sister and mother. One of his sister who is a twin has lymphoma. He was evaluated at Hospital. He states that labs were unremarkable except for a decreased GFR and slightly elevated liver enzymes. Abdominal CT scan was done which was negative. Last 3 days has gotten better. Did have one episode last night of nauseousness with vomiting. He is trying to chew up his food as well as drink more liquids and soft foods.    Hyperlipidemia  He is taking Zocor 10 mg a day. No recent labs performed for his chronic hyperlipidemia.    Family history of systemic lupus erythematosus (SLE) in mother  He has a mother and sister with lupus. Concerned about his symptoms being secondary to lupus. Denies any skin changes.    Obstructive sleep apnea syndrome  He states he still is concerned about sleep apnea. Last time we talked about it and he has been waiting to be contacted.       Current medicines (including changes today)  Current Outpatient Prescriptions   Medication Sig Dispense Refill   • apixaban (ELIQUIS) 5mg Tab Take 1 Tab by mouth 2 Times a Day. 60 Tab 0   • pantoprazole (PROTONIX) 40 MG Tablet Delayed Response Take 40 mg by mouth.     • amitriptyline (ELAVIL) 100 MG Tab Take 100 mg by mouth every evening.     • simvastatin (ZOCOR) 10 MG Tab Take 10 mg by mouth every evening.     • ranitidine (ZANTAC) 150  "MG Tab Take 1 Tab by mouth 2 times a day. 60 Tab 3   • ondansetron (ZOFRAN ODT) 4 MG TBDP Take 1 Tab by mouth every 8 hours as needed for Nausea/Vomiting. 15 Tab 1     No current facility-administered medications for this visit.      He  has a past medical history of Bronchitis; GERD (gastroesophageal reflux disease); IBS (irritable bowel syndrome); Indigestion; Pneumonia; and Renal disorder.    Social History and Family History were reviewed and updated.    ROS   No chest pain, no shortness of breath, no abdominal pain and all other systems were reviewed and are negative.       Objective:     Pulse (!) 112, temperature 37.3 °C (99.1 °F), resp. rate 16, height 1.702 m (5' 7\"), weight 91.2 kg (201 lb), SpO2 97 %. Body mass index is 31.48 kg/m².   Physical Exam:  Constitutional: Alert, no distress.  Skin: Warm, dry, good turgor, no rashes in visible areas.  Eye: Equal, round and reactive, conjunctiva clear, lids normal.  ENMT: Lips without lesions, good dentition, oropharynx clear.  Neck: Trachea midline, no masses.   Lymph: No cervical or supraclavicular lymphadenopathy  Respiratory: Unlabored respiratory effort, lungs clear to auscultation, no wheezes, no ronchi.  Cardiovascular: Normal S1, S2, no murmur, no edema.  Abdomen: Soft, non-tender, no masses.  Psych: Alert and oriented x3, normal affect and mood.        Assessment and Plan:   The following treatment plan was discussed    1. Non-intractable vomiting with nausea, unspecified vomiting type  Acute, new onset condition. Symptoms likely secondary to a stricture. Referred to [facility] urgently. Advised to chew foods and only anything such as steak or starches.  - REFERRAL TO GASTROENTEROLOGY  - ANTI-NUCLEAR ANTIBODY SERUM; Future    2. Gastroesophageal reflux disease, esophagitis presence not specified  Chronic condition. Controlled. Continue PPI and H2 blockers as directed.  - REFERRAL TO GASTROENTEROLOGY    3. History of esophageal stricture  Referred to GI to " reestablish care for possible stricture.    4. Hyperlipidemia, unspecified hyperlipidemia type  Chronic condition. Status unknown. Lipid panel ordered. Continue Zocor daily.  - LIPID PROFILE; Future    5. Obstructive sleep apnea syndrome  Chronic condition. Refer to sleep study.  - REFERRAL TO SLEEP STUDIES    6. Family history of systemic lupus erythematosus (SLE) in mother  Significant family history with current symptoms of nausea and vomiting suggestive of lupus. TANYA ordered.  - ANTI-NUCLEAR ANTIBODY SERUM; Future    Total 25 minutes face-to-face time spent with patient, with greater than 50% of the total time discussing patient's issues and symptoms as listed above in assessment and plan, as well as managing coordination of care for future evaluation and treatment.      Followup: Return in about 4 weeks (around 3/22/2018), or if symptoms worsen or fail to improve.    Please note that this dictation was created using voice recognition software. I have made every reasonable attempt to correct obvious errors, but I expect that there are errors of grammar and possibly content that I did not discover before finalizing the note.            CT scan [facility] negative, stool syudies negative for parasites, UA negative. CT scan [facility] negative, stool syudies negative for parasites, UA negative. declined workup until I spoke with her GI Dr mejia - spoke with him at 1pm and then pt consented to workup. see mdm

## 2025-05-11 DIAGNOSIS — R11.15 CYCLICAL VOMITING SYNDROME: ICD-10-CM

## 2025-05-12 RX ORDER — LORAZEPAM 1 MG/1
TABLET ORAL
Qty: 10 TABLET | Refills: 0 | Status: SHIPPED | OUTPATIENT
Start: 2025-05-12 | End: 2025-06-11

## 2025-05-21 ENCOUNTER — OFFICE VISIT (OUTPATIENT)
Dept: MEDICAL GROUP | Facility: MEDICAL CENTER | Age: 50
End: 2025-05-21
Payer: COMMERCIAL

## 2025-05-21 VITALS
HEIGHT: 67 IN | TEMPERATURE: 98.4 F | HEART RATE: 96 BPM | BODY MASS INDEX: 30.13 KG/M2 | OXYGEN SATURATION: 95 % | SYSTOLIC BLOOD PRESSURE: 104 MMHG | DIASTOLIC BLOOD PRESSURE: 60 MMHG | WEIGHT: 192 LBS

## 2025-05-21 DIAGNOSIS — E78.5 DYSLIPIDEMIA: ICD-10-CM

## 2025-05-21 DIAGNOSIS — Z00.00 PREVENTATIVE HEALTH CARE: ICD-10-CM

## 2025-05-21 DIAGNOSIS — R11.15 CYCLICAL VOMITING SYNDROME: ICD-10-CM

## 2025-05-21 DIAGNOSIS — Z12.5 ENCOUNTER FOR SCREENING PROSTATE SPECIFIC ANTIGEN (PSA) MEASUREMENT: ICD-10-CM

## 2025-05-21 DIAGNOSIS — Z12.11 SCREENING FOR COLORECTAL CANCER: ICD-10-CM

## 2025-05-21 DIAGNOSIS — Z12.12 SCREENING FOR COLORECTAL CANCER: ICD-10-CM

## 2025-05-21 PROCEDURE — 3074F SYST BP LT 130 MM HG: CPT | Performed by: PHYSICIAN ASSISTANT

## 2025-05-21 PROCEDURE — 99214 OFFICE O/P EST MOD 30 MIN: CPT | Performed by: PHYSICIAN ASSISTANT

## 2025-05-21 PROCEDURE — 3078F DIAST BP <80 MM HG: CPT | Performed by: PHYSICIAN ASSISTANT

## 2025-05-21 ASSESSMENT — FIBROSIS 4 INDEX: FIB4 SCORE: 1.63

## 2025-05-21 NOTE — PROGRESS NOTES
"Subjective:     History of Present Illness  The patient presents for a follow-up visit.    He continues to experience episodes of cyclical vomiting, with a recent severe episode occurring after his return from [place]. Initially, he believed the condition had improved, but upon resuming work, the symptoms exacerbated, necessitating a week-long absence from work. He is currently on lorazepam, as needed, and pantoprazole, administered twice daily.    He had been referred to a gastroenterologist for colon cancer screening but did not follow-up. He is also due for a shingles vaccine. He acknowledges the need for weight loss but has not recently monitored his weight.    He is on simvastatin 20 mg.      Current medicines (including changes today)  Current Medications[1]  He  has a past medical history of Bronchitis, Chickenpox, GERD (gastroesophageal reflux disease), IBS (irritable bowel syndrome), Indigestion, Influenza, Pneumonia, Pulmonary embolism (HCC), Renal disorder, and Sleep apnea.    ROS   No chest pain, no shortness of breath, no abdominal pain  Positive ROS as per HPI.  All other systems reviewed and are negative.     Objective:     /60 (BP Location: Right arm, Patient Position: Sitting, BP Cuff Size: Adult)   Pulse 96   Temp 36.9 °C (98.4 °F) (Temporal)   Ht 1.706 m (5' 7.17\")   Wt 87.1 kg (192 lb)   SpO2 95%  Body mass index is 29.92 kg/m².   Physical Exam  General: BMI is over 30, indicating obesity.  Constitutional: Alert, no distress.  Skin: Warm, dry, good turgor, no rashes in visible areas.  Eye: Equal, round and reactive, conjunctiva clear, lids normal.  ENMT: Lips without lesions, good dentition, oropharynx clear.  Neck: Trachea midline, no masses, no thyromegaly.  Psych: Alert and oriented x3, normal affect and mood.      Results  Labs   - Cholesterol levels: 11/2024, High    Imaging   - CT scan of chest: 2024, Mild coronary artery calcifications        Assessment and Plan:   The following " treatment plan was discussed    Assessment & Plan  1. Cyclical vomiting syndrome.  Chronic condition.  Stable.  - Continues to experience bouts of cyclical vomiting, exacerbated by stress and travel.  - Currently taking lorazepam as needed and pantoprazole twice a day for management.  - Medication effectiveness and ongoing symptoms were reviewed.    2. Hyperlipidemia.  Chronic condition.  - Cholesterol levels remain elevated despite being on simvastatin 20 mg.  - CT cardiac scoring test ordered to assess the extent of plaque accumulation in the heart.  - Advised to abstain from coffee and antihistamines for 4 hours prior to the test.  - Potential increase in simvastatin dosage or change in medication if significant plaque is found.    3. Health maintenance.  - Informed about the shingles vaccine and its importance.  - Cologuard test ordered for colon cancer screening.  - Fasting labs ordered for November, including complete blood count, liver and kidney function tests, A1c, cholesterol level, thyroid level, and PSA.  - Advised to monitor weight and discussed BMI over 30 indicating obesity.    4. Preventive care.  - Encouraged to complete the Cologuard test upon receipt.  - Discussed the process and convenience of dropping off the sample at a [employer] store.  - Emphasized the importance of timely completion and follow-up every 3 years if negative.        ORDERS:  1. Cyclical vomiting syndrome      2. Dyslipidemia    - CT-CARDIAC SCORING; Future    3. Preventative health care  - CBC WITHOUT DIFFERENTIAL; Future  - Comp Metabolic Panel; Future  - HEMOGLOBIN A1C; Future  - Lipid Profile; Future  - TSH WITH REFLEX TO FT4; Future  - PROSTATE SPECIFIC AG SCREENING; Future    4. Encounter for screening prostate specific antigen (PSA) measurement    - PROSTATE SPECIFIC AG SCREENING; Future    5. Screening for colorectal cancer    - Cologuard® colon cancer screening    () Today's E/M visit is associated with medical care  services that serve as the continuing focal point for all needed health care services and/or with medical care services that are part of ongoing care related to a patient's single, serious condition or a complex condition: This includes furnishing services to patients on an ongoing basis that result in care that is personalized to the patient. The services result in a comprehensive, longitudinal, and continuous relationship with the patient and involve delivery of team-based care that is accessible, coordinated with other practitioners and providers, and integrated with the broader health care landscape.      Please note that this dictation was created using voice recognition software. I have made every reasonable attempt to correct obvious errors, but I expect that there are errors of grammar and possibly content that I did not discover before finalizing the note.      Attestation      Verbal consent was acquired by the patient to use [employer] ambient listening note generation during this visit Yes                  [1]   Current Outpatient Medications   Medication Sig Dispense Refill    LORazepam (ATIVAN) 1 MG Tab TAKE ONE TABLET BY MOUTH EVERY EIGHT HOURS AS NEEDED for up to 10 days for cyclical vomiting syndrome 10 Tablet 0    simvastatin (ZOCOR) 20 MG Tab TAKE ONE TABLET BY MOUTH ONE TIME DAILY IN THE EVENING. 90 Tablet 2    pantoprazole (PROTONIX) 40 MG Tablet Delayed Response Take 1 Tablet by mouth 2 times a day. 180 Tablet 3     No current facility-administered medications for this visit.

## 2025-06-14 LAB — NONINV COLON CA DNA+OCC BLD SCRN STL QL: POSITIVE

## 2025-06-16 ENCOUNTER — RESULTS FOLLOW-UP (OUTPATIENT)
Dept: MEDICAL GROUP | Facility: MEDICAL CENTER | Age: 50
End: 2025-06-16

## 2025-06-16 DIAGNOSIS — R19.5 POSITIVE COLORECTAL CANCER SCREENING USING COLOGUARD TEST: Primary | ICD-10-CM

## 2025-06-20 NOTE — Clinical Note
REFERRAL APPROVAL NOTICE         Sent on June 20, 2025                   Jerome Cardoza   [address]  [address]  [address]  [place] [address]                   Dear Mr. Cardoza,    After a careful review of the medical information and benefit coverage, [facility] has processed your referral. See below for additional details.    If applicable, you must be actively enrolled with your insurance for coverage of the authorized service. If you have any questions regarding your coverage, please contact your insurance directly.    REFERRAL INFORMATION   Referral #:  [id number]  Referred-To Provider    Referred-By Provider:  Gastroenterology    Andrea Machado P.A.-C.   [facility]      [address]  [address]  [place] [id number] [phone] [address] [facility] [address]  [phone]    Referral Start Date:  06/16/2025  Referral End Date:   06/16/2026             SCHEDULING  If you do not already have an appointment, please call [phone] to make an appointment.     MORE INFORMATION  If you do not already have a [facility] account, sign up at: [email or web address]  You can access your medical information, make appointments, see lab results, billing information, and more.  If you have questions regarding this referral, please contact  the [facility] Referrals department at:             [phone]. Monday - Friday 8:00AM - 5:00PM.     Sincerely,    [facility]

## 2025-06-30 ENCOUNTER — APPOINTMENT (OUTPATIENT)
Dept: RADIOLOGY | Facility: MEDICAL CENTER | Age: 50
End: 2025-06-30
Attending: PHYSICIAN ASSISTANT
Payer: COMMERCIAL

## 2025-07-10 ENCOUNTER — HOSPITAL ENCOUNTER (OUTPATIENT)
Dept: RADIOLOGY | Facility: MEDICAL CENTER | Age: 50
End: 2025-07-10
Attending: PHYSICIAN ASSISTANT
Payer: COMMERCIAL

## 2025-07-10 DIAGNOSIS — E78.5 DYSLIPIDEMIA: ICD-10-CM

## 2025-07-10 PROCEDURE — 4410556 CT-CARDIAC SCORING (SELF PAY ONLY)
